# Patient Record
Sex: MALE | Race: WHITE | NOT HISPANIC OR LATINO | URBAN - METROPOLITAN AREA
[De-identification: names, ages, dates, MRNs, and addresses within clinical notes are randomized per-mention and may not be internally consistent; named-entity substitution may affect disease eponyms.]

---

## 2017-01-05 ENCOUNTER — COMMUNICATION - HEALTHEAST (OUTPATIENT)
Dept: FAMILY MEDICINE | Facility: CLINIC | Age: 52
End: 2017-01-05

## 2017-01-18 ENCOUNTER — COMMUNICATION - HEALTHEAST (OUTPATIENT)
Dept: FAMILY MEDICINE | Facility: CLINIC | Age: 52
End: 2017-01-18

## 2017-01-18 ENCOUNTER — OFFICE VISIT - HEALTHEAST (OUTPATIENT)
Dept: FAMILY MEDICINE | Facility: CLINIC | Age: 52
End: 2017-01-18

## 2017-01-18 DIAGNOSIS — I10 BENIGN HYPERTENSION: ICD-10-CM

## 2017-01-18 DIAGNOSIS — E78.5 HYPERLIPIDEMIA: ICD-10-CM

## 2017-01-18 DIAGNOSIS — K21.9 GERD WITHOUT ESOPHAGITIS: ICD-10-CM

## 2017-01-18 LAB
CHOLEST SERPL-MCNC: 235 MG/DL
FASTING STATUS PATIENT QL REPORTED: YES
HDLC SERPL-MCNC: 41 MG/DL
LDLC SERPL CALC-MCNC: 163 MG/DL
TRIGL SERPL-MCNC: 155 MG/DL

## 2017-01-18 ASSESSMENT — MIFFLIN-ST. JEOR: SCORE: 1573.14

## 2017-01-19 ENCOUNTER — COMMUNICATION - HEALTHEAST (OUTPATIENT)
Dept: FAMILY MEDICINE | Facility: CLINIC | Age: 52
End: 2017-01-19

## 2017-02-07 ENCOUNTER — OFFICE VISIT - HEALTHEAST (OUTPATIENT)
Dept: FAMILY MEDICINE | Facility: CLINIC | Age: 52
End: 2017-02-07

## 2017-02-07 ENCOUNTER — COMMUNICATION - HEALTHEAST (OUTPATIENT)
Dept: FAMILY MEDICINE | Facility: CLINIC | Age: 52
End: 2017-02-07

## 2017-02-07 DIAGNOSIS — F41.1 GENERALIZED ANXIETY DISORDER: ICD-10-CM

## 2017-02-07 DIAGNOSIS — I10 ESSENTIAL HYPERTENSION WITH GOAL BLOOD PRESSURE LESS THAN 140/90: ICD-10-CM

## 2017-02-07 DIAGNOSIS — K21.00 REFLUX ESOPHAGITIS: ICD-10-CM

## 2017-04-18 ENCOUNTER — OFFICE VISIT - HEALTHEAST (OUTPATIENT)
Dept: FAMILY MEDICINE | Facility: CLINIC | Age: 52
End: 2017-04-18

## 2017-04-18 ENCOUNTER — COMMUNICATION - HEALTHEAST (OUTPATIENT)
Dept: FAMILY MEDICINE | Facility: CLINIC | Age: 52
End: 2017-04-18

## 2017-04-18 DIAGNOSIS — K21.9 GERD WITHOUT ESOPHAGITIS: ICD-10-CM

## 2017-04-18 DIAGNOSIS — F41.1 GENERALIZED ANXIETY DISORDER: ICD-10-CM

## 2017-04-18 DIAGNOSIS — F41.0 PANIC ATTACKS: ICD-10-CM

## 2017-04-18 DIAGNOSIS — E78.5 HYPERLIPIDEMIA: ICD-10-CM

## 2017-04-18 DIAGNOSIS — I10 BENIGN HYPERTENSION: ICD-10-CM

## 2017-05-16 ENCOUNTER — OFFICE VISIT - HEALTHEAST (OUTPATIENT)
Dept: FAMILY MEDICINE | Facility: CLINIC | Age: 52
End: 2017-05-16

## 2017-05-16 DIAGNOSIS — B07.8 COMMON WART: ICD-10-CM

## 2017-05-16 DIAGNOSIS — F41.1 GENERALIZED ANXIETY DISORDER: ICD-10-CM

## 2017-05-16 DIAGNOSIS — G47.00 INSOMNIA: ICD-10-CM

## 2017-05-16 DIAGNOSIS — E78.5 HYPERLIPIDEMIA, UNSPECIFIED HYPERLIPIDEMIA TYPE: ICD-10-CM

## 2017-05-16 DIAGNOSIS — I10 BENIGN HYPERTENSION: ICD-10-CM

## 2017-06-15 ENCOUNTER — COMMUNICATION - HEALTHEAST (OUTPATIENT)
Dept: FAMILY MEDICINE | Facility: CLINIC | Age: 52
End: 2017-06-15

## 2017-06-15 DIAGNOSIS — F41.1 GENERALIZED ANXIETY DISORDER: ICD-10-CM

## 2017-06-15 DIAGNOSIS — G47.00 INSOMNIA: ICD-10-CM

## 2017-06-19 ENCOUNTER — COMMUNICATION - HEALTHEAST (OUTPATIENT)
Dept: FAMILY MEDICINE | Facility: CLINIC | Age: 52
End: 2017-06-19

## 2017-06-19 DIAGNOSIS — G47.00 INSOMNIA: ICD-10-CM

## 2017-07-17 ENCOUNTER — COMMUNICATION - HEALTHEAST (OUTPATIENT)
Dept: FAMILY MEDICINE | Facility: CLINIC | Age: 52
End: 2017-07-17

## 2017-07-17 DIAGNOSIS — F41.0 PANIC ATTACKS: ICD-10-CM

## 2017-07-17 DIAGNOSIS — I10 ESSENTIAL HYPERTENSION WITH GOAL BLOOD PRESSURE LESS THAN 140/90: ICD-10-CM

## 2017-07-17 DIAGNOSIS — I10 BENIGN HYPERTENSION: ICD-10-CM

## 2017-07-19 ENCOUNTER — COMMUNICATION - HEALTHEAST (OUTPATIENT)
Dept: FAMILY MEDICINE | Facility: CLINIC | Age: 52
End: 2017-07-19

## 2017-07-19 DIAGNOSIS — I10 ESSENTIAL HYPERTENSION WITH GOAL BLOOD PRESSURE LESS THAN 140/90: ICD-10-CM

## 2017-10-17 ENCOUNTER — COMMUNICATION - HEALTHEAST (OUTPATIENT)
Dept: FAMILY MEDICINE | Facility: CLINIC | Age: 52
End: 2017-10-17

## 2017-10-17 DIAGNOSIS — I10 BENIGN HYPERTENSION: ICD-10-CM

## 2017-11-19 ENCOUNTER — COMMUNICATION - HEALTHEAST (OUTPATIENT)
Dept: FAMILY MEDICINE | Facility: CLINIC | Age: 52
End: 2017-11-19

## 2017-11-19 DIAGNOSIS — F41.1 GENERALIZED ANXIETY DISORDER: ICD-10-CM

## 2018-01-07 ENCOUNTER — COMMUNICATION - HEALTHEAST (OUTPATIENT)
Dept: FAMILY MEDICINE | Facility: CLINIC | Age: 53
End: 2018-01-07

## 2018-01-07 DIAGNOSIS — F41.0 PANIC ATTACKS: ICD-10-CM

## 2018-01-07 DIAGNOSIS — K21.9 GERD WITHOUT ESOPHAGITIS: ICD-10-CM

## 2018-01-11 ENCOUNTER — OFFICE VISIT - HEALTHEAST (OUTPATIENT)
Dept: FAMILY MEDICINE | Facility: CLINIC | Age: 53
End: 2018-01-11

## 2018-01-11 ENCOUNTER — COMMUNICATION - HEALTHEAST (OUTPATIENT)
Dept: TELEHEALTH | Facility: CLINIC | Age: 53
End: 2018-01-11

## 2018-01-11 DIAGNOSIS — R42 DIZZINESS: ICD-10-CM

## 2018-01-11 DIAGNOSIS — H92.01 EAR PAIN, RIGHT: ICD-10-CM

## 2018-01-11 ASSESSMENT — MIFFLIN-ST. JEOR: SCORE: 1541.39

## 2018-04-11 ENCOUNTER — COMMUNICATION - HEALTHEAST (OUTPATIENT)
Dept: FAMILY MEDICINE | Facility: CLINIC | Age: 53
End: 2018-04-11

## 2018-04-11 DIAGNOSIS — K21.9 GERD WITHOUT ESOPHAGITIS: ICD-10-CM

## 2018-05-24 ENCOUNTER — COMMUNICATION - HEALTHEAST (OUTPATIENT)
Dept: FAMILY MEDICINE | Facility: CLINIC | Age: 53
End: 2018-05-24

## 2018-05-24 DIAGNOSIS — F41.1 GENERALIZED ANXIETY DISORDER: ICD-10-CM

## 2018-06-26 ENCOUNTER — OFFICE VISIT - HEALTHEAST (OUTPATIENT)
Dept: FAMILY MEDICINE | Facility: CLINIC | Age: 53
End: 2018-06-26

## 2018-06-26 DIAGNOSIS — G47.00 INSOMNIA: ICD-10-CM

## 2018-06-26 DIAGNOSIS — E78.5 HYPERLIPIDEMIA, UNSPECIFIED HYPERLIPIDEMIA TYPE: ICD-10-CM

## 2018-06-26 DIAGNOSIS — Z12.11 SCREEN FOR COLON CANCER: ICD-10-CM

## 2018-06-26 DIAGNOSIS — F41.1 GENERALIZED ANXIETY DISORDER: ICD-10-CM

## 2018-06-26 DIAGNOSIS — K21.9 GERD WITHOUT ESOPHAGITIS: ICD-10-CM

## 2018-06-26 DIAGNOSIS — M54.50 LUMBAR PAIN: ICD-10-CM

## 2018-06-26 DIAGNOSIS — F41.0 PANIC ATTACKS: ICD-10-CM

## 2018-06-26 DIAGNOSIS — I10 BENIGN HYPERTENSION: ICD-10-CM

## 2018-06-26 LAB
ALBUMIN SERPL-MCNC: 3.8 G/DL (ref 3.5–5)
ALP SERPL-CCNC: 61 U/L (ref 45–120)
ALT SERPL W P-5'-P-CCNC: 20 U/L (ref 0–45)
ANION GAP SERPL CALCULATED.3IONS-SCNC: 9 MMOL/L (ref 5–18)
AST SERPL W P-5'-P-CCNC: 14 U/L (ref 0–40)
BILIRUB SERPL-MCNC: 0.4 MG/DL (ref 0–1)
BUN SERPL-MCNC: 11 MG/DL (ref 8–22)
CALCIUM SERPL-MCNC: 9.3 MG/DL (ref 8.5–10.5)
CHLORIDE BLD-SCNC: 109 MMOL/L (ref 98–107)
CHOLEST SERPL-MCNC: 260 MG/DL
CO2 SERPL-SCNC: 23 MMOL/L (ref 22–31)
CREAT SERPL-MCNC: 0.8 MG/DL (ref 0.7–1.3)
FASTING STATUS PATIENT QL REPORTED: YES
GFR SERPL CREATININE-BSD FRML MDRD: >60 ML/MIN/1.73M2
GLUCOSE BLD-MCNC: 118 MG/DL (ref 70–125)
HDLC SERPL-MCNC: 42 MG/DL
LDLC SERPL CALC-MCNC: 191 MG/DL
POTASSIUM BLD-SCNC: 4.3 MMOL/L (ref 3.5–5)
PROT SERPL-MCNC: 6.8 G/DL (ref 6–8)
SODIUM SERPL-SCNC: 141 MMOL/L (ref 136–145)
TRIGL SERPL-MCNC: 137 MG/DL

## 2018-06-27 LAB — 25(OH)D3 SERPL-MCNC: 19.2 NG/ML (ref 30–80)

## 2018-06-28 ENCOUNTER — COMMUNICATION - HEALTHEAST (OUTPATIENT)
Dept: FAMILY MEDICINE | Facility: CLINIC | Age: 53
End: 2018-06-28

## 2018-06-28 DIAGNOSIS — E78.2 MIXED HYPERLIPIDEMIA: ICD-10-CM

## 2018-07-10 ENCOUNTER — COMMUNICATION - HEALTHEAST (OUTPATIENT)
Dept: FAMILY MEDICINE | Facility: CLINIC | Age: 53
End: 2018-07-10

## 2018-07-10 DIAGNOSIS — K21.9 GERD WITHOUT ESOPHAGITIS: ICD-10-CM

## 2018-07-13 ENCOUNTER — COMMUNICATION - HEALTHEAST (OUTPATIENT)
Dept: FAMILY MEDICINE | Facility: CLINIC | Age: 53
End: 2018-07-13

## 2018-07-24 ENCOUNTER — AMBULATORY - HEALTHEAST (OUTPATIENT)
Dept: NURSING | Facility: CLINIC | Age: 53
End: 2018-07-24

## 2018-07-24 DIAGNOSIS — Z01.30 BLOOD PRESSURE CHECK: ICD-10-CM

## 2018-08-28 ENCOUNTER — OFFICE VISIT - HEALTHEAST (OUTPATIENT)
Dept: FAMILY MEDICINE | Facility: CLINIC | Age: 53
End: 2018-08-28

## 2018-08-28 DIAGNOSIS — G47.00 INSOMNIA: ICD-10-CM

## 2018-08-28 DIAGNOSIS — F41.0 PANIC ATTACKS: ICD-10-CM

## 2018-08-28 DIAGNOSIS — I10 BENIGN HYPERTENSION: ICD-10-CM

## 2018-08-28 DIAGNOSIS — F41.1 GENERALIZED ANXIETY DISORDER: ICD-10-CM

## 2018-08-28 DIAGNOSIS — Z79.899 CONTROLLED SUBSTANCE AGREEMENT SIGNED: ICD-10-CM

## 2018-08-28 DIAGNOSIS — F32.1 MODERATE MAJOR DEPRESSION (H): ICD-10-CM

## 2018-08-28 DIAGNOSIS — E78.5 HYPERLIPIDEMIA, UNSPECIFIED HYPERLIPIDEMIA TYPE: ICD-10-CM

## 2018-08-28 DIAGNOSIS — E55.9 VITAMIN D DEFICIENCY: ICD-10-CM

## 2018-08-28 DIAGNOSIS — Z23 IMMUNIZATION DUE: ICD-10-CM

## 2018-08-28 LAB
ALBUMIN SERPL-MCNC: 3.9 G/DL (ref 3.5–5)
ALP SERPL-CCNC: 54 U/L (ref 45–120)
ALT SERPL W P-5'-P-CCNC: 22 U/L (ref 0–45)
ANION GAP SERPL CALCULATED.3IONS-SCNC: 9 MMOL/L (ref 5–18)
AST SERPL W P-5'-P-CCNC: 15 U/L (ref 0–40)
BILIRUB DIRECT SERPL-MCNC: 0.2 MG/DL
BILIRUB SERPL-MCNC: 0.6 MG/DL (ref 0–1)
BUN SERPL-MCNC: 10 MG/DL (ref 8–22)
CALCIUM SERPL-MCNC: 9.5 MG/DL (ref 8.5–10.5)
CHLORIDE BLD-SCNC: 110 MMOL/L (ref 98–107)
CHOLEST SERPL-MCNC: 187 MG/DL
CO2 SERPL-SCNC: 24 MMOL/L (ref 22–31)
CREAT SERPL-MCNC: 0.86 MG/DL (ref 0.7–1.3)
FASTING STATUS PATIENT QL REPORTED: YES
GFR SERPL CREATININE-BSD FRML MDRD: >60 ML/MIN/1.73M2
GLUCOSE BLD-MCNC: 115 MG/DL (ref 70–125)
HDLC SERPL-MCNC: 44 MG/DL
LDLC SERPL CALC-MCNC: 122 MG/DL
POTASSIUM BLD-SCNC: 4.5 MMOL/L (ref 3.5–5)
PROT SERPL-MCNC: 6.7 G/DL (ref 6–8)
SODIUM SERPL-SCNC: 143 MMOL/L (ref 136–145)
TRIGL SERPL-MCNC: 103 MG/DL

## 2018-08-29 LAB — 25(OH)D3 SERPL-MCNC: 58.1 NG/ML (ref 30–80)

## 2018-08-30 ENCOUNTER — COMMUNICATION - HEALTHEAST (OUTPATIENT)
Dept: FAMILY MEDICINE | Facility: CLINIC | Age: 53
End: 2018-08-30

## 2018-09-24 ENCOUNTER — COMMUNICATION - HEALTHEAST (OUTPATIENT)
Dept: FAMILY MEDICINE | Facility: CLINIC | Age: 53
End: 2018-09-24

## 2018-09-24 DIAGNOSIS — E78.2 MIXED HYPERLIPIDEMIA: ICD-10-CM

## 2018-12-10 ENCOUNTER — COMMUNICATION - HEALTHEAST (OUTPATIENT)
Dept: FAMILY MEDICINE | Facility: CLINIC | Age: 53
End: 2018-12-10

## 2018-12-10 DIAGNOSIS — I10 BENIGN HYPERTENSION: ICD-10-CM

## 2018-12-10 DIAGNOSIS — F41.0 PANIC ATTACKS: ICD-10-CM

## 2018-12-10 DIAGNOSIS — G47.00 INSOMNIA: ICD-10-CM

## 2018-12-10 DIAGNOSIS — F41.1 GENERALIZED ANXIETY DISORDER: ICD-10-CM

## 2019-01-10 ENCOUNTER — COMMUNICATION - HEALTHEAST (OUTPATIENT)
Dept: FAMILY MEDICINE | Facility: CLINIC | Age: 54
End: 2019-01-10

## 2019-01-10 DIAGNOSIS — K21.9 GERD WITHOUT ESOPHAGITIS: ICD-10-CM

## 2019-01-17 ENCOUNTER — COMMUNICATION - HEALTHEAST (OUTPATIENT)
Dept: FAMILY MEDICINE | Facility: CLINIC | Age: 54
End: 2019-01-17

## 2019-01-17 DIAGNOSIS — K21.9 GASTROESOPHAGEAL REFLUX DISEASE, ESOPHAGITIS PRESENCE NOT SPECIFIED: ICD-10-CM

## 2019-01-22 ENCOUNTER — COMMUNICATION - HEALTHEAST (OUTPATIENT)
Dept: FAMILY MEDICINE | Facility: CLINIC | Age: 54
End: 2019-01-22

## 2019-01-22 DIAGNOSIS — K21.9 GERD WITHOUT ESOPHAGITIS: ICD-10-CM

## 2019-01-30 ENCOUNTER — COMMUNICATION - HEALTHEAST (OUTPATIENT)
Dept: FAMILY MEDICINE | Facility: CLINIC | Age: 54
End: 2019-01-30

## 2019-01-30 DIAGNOSIS — K21.9 GERD WITHOUT ESOPHAGITIS: ICD-10-CM

## 2019-04-14 ENCOUNTER — COMMUNICATION - HEALTHEAST (OUTPATIENT)
Dept: FAMILY MEDICINE | Facility: CLINIC | Age: 54
End: 2019-04-14

## 2019-04-14 DIAGNOSIS — G47.00 INSOMNIA: ICD-10-CM

## 2019-04-14 DIAGNOSIS — F41.0 PANIC ATTACKS: ICD-10-CM

## 2019-04-30 ENCOUNTER — OFFICE VISIT - HEALTHEAST (OUTPATIENT)
Dept: FAMILY MEDICINE | Facility: CLINIC | Age: 54
End: 2019-04-30

## 2019-04-30 DIAGNOSIS — F41.0 PANIC ATTACKS: ICD-10-CM

## 2019-04-30 DIAGNOSIS — E78.5 HYPERLIPIDEMIA, UNSPECIFIED HYPERLIPIDEMIA TYPE: ICD-10-CM

## 2019-04-30 DIAGNOSIS — B07.8 COMMON WART: ICD-10-CM

## 2019-04-30 DIAGNOSIS — Z79.899 CONTROLLED SUBSTANCE AGREEMENT SIGNED: ICD-10-CM

## 2019-04-30 DIAGNOSIS — I10 BENIGN HYPERTENSION: ICD-10-CM

## 2019-04-30 DIAGNOSIS — F32.1 MODERATE MAJOR DEPRESSION (H): ICD-10-CM

## 2019-04-30 DIAGNOSIS — E55.9 VITAMIN D DEFICIENCY: ICD-10-CM

## 2019-04-30 DIAGNOSIS — G47.00 INSOMNIA: ICD-10-CM

## 2019-04-30 DIAGNOSIS — K21.9 GASTROESOPHAGEAL REFLUX DISEASE WITHOUT ESOPHAGITIS: ICD-10-CM

## 2019-04-30 DIAGNOSIS — F41.1 GENERALIZED ANXIETY DISORDER: ICD-10-CM

## 2019-04-30 DIAGNOSIS — Z12.11 SCREENING FOR COLON CANCER: ICD-10-CM

## 2019-04-30 LAB
ALBUMIN SERPL-MCNC: 3.7 G/DL (ref 3.5–5)
ALP SERPL-CCNC: 57 U/L (ref 45–120)
ALT SERPL W P-5'-P-CCNC: 34 U/L (ref 0–45)
ANION GAP SERPL CALCULATED.3IONS-SCNC: 9 MMOL/L (ref 5–18)
AST SERPL W P-5'-P-CCNC: 16 U/L (ref 0–40)
BILIRUB SERPL-MCNC: 0.3 MG/DL (ref 0–1)
BUN SERPL-MCNC: 8 MG/DL (ref 8–22)
CALCIUM SERPL-MCNC: 9.5 MG/DL (ref 8.5–10.5)
CHLORIDE BLD-SCNC: 107 MMOL/L (ref 98–107)
CHOLEST SERPL-MCNC: 189 MG/DL
CO2 SERPL-SCNC: 25 MMOL/L (ref 22–31)
CREAT SERPL-MCNC: 0.9 MG/DL (ref 0.7–1.3)
FASTING STATUS PATIENT QL REPORTED: YES
GFR SERPL CREATININE-BSD FRML MDRD: >60 ML/MIN/1.73M2
GLUCOSE BLD-MCNC: 114 MG/DL (ref 70–125)
HDLC SERPL-MCNC: 50 MG/DL
LDLC SERPL CALC-MCNC: 103 MG/DL
POTASSIUM BLD-SCNC: 4.4 MMOL/L (ref 3.5–5)
PROT SERPL-MCNC: 6.4 G/DL (ref 6–8)
SODIUM SERPL-SCNC: 141 MMOL/L (ref 136–145)
TRIGL SERPL-MCNC: 180 MG/DL

## 2019-05-01 LAB — 25(OH)D3 SERPL-MCNC: 58 NG/ML (ref 30–80)

## 2019-05-02 ENCOUNTER — COMMUNICATION - HEALTHEAST (OUTPATIENT)
Dept: FAMILY MEDICINE | Facility: CLINIC | Age: 54
End: 2019-05-02

## 2019-05-06 ENCOUNTER — COMMUNICATION - HEALTHEAST (OUTPATIENT)
Dept: FAMILY MEDICINE | Facility: CLINIC | Age: 54
End: 2019-05-06

## 2019-06-24 ENCOUNTER — COMMUNICATION - HEALTHEAST (OUTPATIENT)
Dept: FAMILY MEDICINE | Facility: CLINIC | Age: 54
End: 2019-06-24

## 2019-06-24 DIAGNOSIS — F41.0 PANIC ATTACKS: ICD-10-CM

## 2019-06-24 DIAGNOSIS — G47.00 INSOMNIA: ICD-10-CM

## 2019-07-22 ENCOUNTER — COMMUNICATION - HEALTHEAST (OUTPATIENT)
Dept: FAMILY MEDICINE | Facility: CLINIC | Age: 54
End: 2019-07-22

## 2019-07-22 DIAGNOSIS — K21.9 GERD WITHOUT ESOPHAGITIS: ICD-10-CM

## 2019-09-15 ENCOUNTER — COMMUNICATION - HEALTHEAST (OUTPATIENT)
Dept: FAMILY MEDICINE | Facility: CLINIC | Age: 54
End: 2019-09-15

## 2019-09-15 DIAGNOSIS — F41.0 PANIC ATTACKS: ICD-10-CM

## 2019-09-15 DIAGNOSIS — G47.00 INSOMNIA: ICD-10-CM

## 2019-09-23 ENCOUNTER — COMMUNICATION - HEALTHEAST (OUTPATIENT)
Dept: FAMILY MEDICINE | Facility: CLINIC | Age: 54
End: 2019-09-23

## 2019-09-23 DIAGNOSIS — I10 BENIGN HYPERTENSION: ICD-10-CM

## 2019-10-28 ENCOUNTER — COMMUNICATION - HEALTHEAST (OUTPATIENT)
Dept: FAMILY MEDICINE | Facility: CLINIC | Age: 54
End: 2019-10-28

## 2019-10-28 DIAGNOSIS — E78.2 MIXED HYPERLIPIDEMIA: ICD-10-CM

## 2019-12-10 ENCOUNTER — COMMUNICATION - HEALTHEAST (OUTPATIENT)
Dept: FAMILY MEDICINE | Facility: CLINIC | Age: 54
End: 2019-12-10

## 2019-12-10 DIAGNOSIS — F41.1 GENERALIZED ANXIETY DISORDER: ICD-10-CM

## 2019-12-10 DIAGNOSIS — G47.00 INSOMNIA: ICD-10-CM

## 2019-12-10 DIAGNOSIS — F41.0 PANIC ATTACKS: ICD-10-CM

## 2019-12-17 ENCOUNTER — COMMUNICATION - HEALTHEAST (OUTPATIENT)
Dept: FAMILY MEDICINE | Facility: CLINIC | Age: 54
End: 2019-12-17

## 2019-12-30 ENCOUNTER — COMMUNICATION - HEALTHEAST (OUTPATIENT)
Dept: FAMILY MEDICINE | Facility: CLINIC | Age: 54
End: 2019-12-30

## 2020-01-14 ENCOUNTER — OFFICE VISIT - HEALTHEAST (OUTPATIENT)
Dept: FAMILY MEDICINE | Facility: CLINIC | Age: 55
End: 2020-01-14

## 2020-01-14 ENCOUNTER — COMMUNICATION - HEALTHEAST (OUTPATIENT)
Dept: FAMILY MEDICINE | Facility: CLINIC | Age: 55
End: 2020-01-14

## 2020-01-14 DIAGNOSIS — Z90.81 HISTORY OF SPLENECTOMY: ICD-10-CM

## 2020-01-14 DIAGNOSIS — G47.00 INSOMNIA: ICD-10-CM

## 2020-01-14 DIAGNOSIS — F32.1 MODERATE MAJOR DEPRESSION (H): ICD-10-CM

## 2020-01-14 DIAGNOSIS — Z79.899 CONTROLLED SUBSTANCE AGREEMENT SIGNED: ICD-10-CM

## 2020-01-14 DIAGNOSIS — F41.0 PANIC ATTACKS: ICD-10-CM

## 2020-01-14 DIAGNOSIS — E78.5 HYPERLIPIDEMIA, UNSPECIFIED HYPERLIPIDEMIA TYPE: ICD-10-CM

## 2020-01-14 DIAGNOSIS — K21.9 GERD WITHOUT ESOPHAGITIS: ICD-10-CM

## 2020-01-14 DIAGNOSIS — I10 BENIGN HYPERTENSION: ICD-10-CM

## 2020-01-14 LAB
ANION GAP SERPL CALCULATED.3IONS-SCNC: 8 MMOL/L (ref 5–18)
BUN SERPL-MCNC: 10 MG/DL (ref 8–22)
CALCIUM SERPL-MCNC: 9.2 MG/DL (ref 8.5–10.5)
CHLORIDE BLD-SCNC: 108 MMOL/L (ref 98–107)
CHOLEST SERPL-MCNC: 173 MG/DL
CO2 SERPL-SCNC: 24 MMOL/L (ref 22–31)
CREAT SERPL-MCNC: 0.84 MG/DL (ref 0.7–1.3)
FASTING STATUS PATIENT QL REPORTED: NORMAL
GFR SERPL CREATININE-BSD FRML MDRD: >60 ML/MIN/1.73M2
GLUCOSE BLD-MCNC: 114 MG/DL (ref 70–125)
HDLC SERPL-MCNC: 45 MG/DL
LDLC SERPL CALC-MCNC: 106 MG/DL
POTASSIUM BLD-SCNC: 4.4 MMOL/L (ref 3.5–5)
SODIUM SERPL-SCNC: 140 MMOL/L (ref 136–145)
TRIGL SERPL-MCNC: 108 MG/DL

## 2020-01-14 ASSESSMENT — ANXIETY QUESTIONNAIRES
IF YOU CHECKED OFF ANY PROBLEMS ON THIS QUESTIONNAIRE, HOW DIFFICULT HAVE THESE PROBLEMS MADE IT FOR YOU TO DO YOUR WORK, TAKE CARE OF THINGS AT HOME, OR GET ALONG WITH OTHER PEOPLE: SOMEWHAT DIFFICULT
3. WORRYING TOO MUCH ABOUT DIFFERENT THINGS: SEVERAL DAYS
1. FEELING NERVOUS, ANXIOUS, OR ON EDGE: SEVERAL DAYS
4. TROUBLE RELAXING: SEVERAL DAYS
7. FEELING AFRAID AS IF SOMETHING AWFUL MIGHT HAPPEN: SEVERAL DAYS
GAD7 TOTAL SCORE: 7
2. NOT BEING ABLE TO STOP OR CONTROL WORRYING: SEVERAL DAYS
6. BECOMING EASILY ANNOYED OR IRRITABLE: SEVERAL DAYS
5. BEING SO RESTLESS THAT IT IS HARD TO SIT STILL: SEVERAL DAYS

## 2020-01-14 ASSESSMENT — MIFFLIN-ST. JEOR: SCORE: 1542.53

## 2020-01-14 ASSESSMENT — PATIENT HEALTH QUESTIONNAIRE - PHQ9: SUM OF ALL RESPONSES TO PHQ QUESTIONS 1-9: 6

## 2020-03-27 ENCOUNTER — VIRTUAL VISIT (OUTPATIENT)
Dept: URGENT CARE | Facility: CLINIC | Age: 55
End: 2020-03-27

## 2020-03-27 ENCOUNTER — COMMUNICATION - HEALTHEAST (OUTPATIENT)
Dept: FAMILY MEDICINE | Facility: CLINIC | Age: 55
End: 2020-03-27

## 2020-03-27 DIAGNOSIS — G47.00 INSOMNIA: ICD-10-CM

## 2020-03-27 DIAGNOSIS — F41.0 PANIC ATTACKS: ICD-10-CM

## 2020-03-27 DIAGNOSIS — Q89.01 ASPLENIA: ICD-10-CM

## 2020-03-27 DIAGNOSIS — Z20.822 SUSPECTED COVID-19 VIRUS INFECTION: Primary | ICD-10-CM

## 2020-03-27 DIAGNOSIS — R05.9 COUGH: ICD-10-CM

## 2020-03-27 PROCEDURE — 99203 OFFICE O/P NEW LOW 30 MIN: CPT | Mod: TEL | Performed by: STUDENT IN AN ORGANIZED HEALTH CARE EDUCATION/TRAINING PROGRAM

## 2020-03-27 RX ORDER — BENZONATATE 100 MG/1
100 CAPSULE ORAL 3 TIMES DAILY PRN
Qty: 30 CAPSULE | Refills: 0 | Status: SHIPPED | OUTPATIENT
Start: 2020-03-27 | End: 2020-04-06

## 2020-03-27 RX ORDER — GUAIFENESIN 600 MG/1
600 TABLET, EXTENDED RELEASE ORAL 2 TIMES DAILY
Qty: 20 TABLET | Refills: 0 | Status: SHIPPED | OUTPATIENT
Start: 2020-03-27 | End: 2020-04-06

## 2020-03-27 NOTE — PROGRESS NOTES
"Preceptor Attestation:   Patient discussed with the resident. Assessment and plan reviewed with resident and agreed upon.   Supervising Physician:  Ryan Richardson MD  [facility]      The patient has been notified of following:     \"This telephone visit will be conducted via a call between you and your physician/provider. We have found that certain health care needs can be provided without the need for a physical exam.  This service lets us provide the care you need with a short phone conversation.  If a prescription is necessary we can send it directly to your pharmacy.  If lab work is needed we can place an order for that and you can then stop by our lab to have the test done at a later time.    If during the course of the call the physician/provider feels a telephone visit is not appropriate, you will not be charged for this service.\"     Subjective     CC: Elvin Trivedi  is a 54 year old male who presents to clinic today for the following health issues:   Chief Complaint   Patient presents with     Suspected Covid      HPI:    Mr. Trivedi is a 54 year old male with a pmhx significant for HTN (on ARB), HLD (on statin), MDD/SILVIA, panic disorder, and asplenia presenting as a suspected COVID-19 visit.     Briefly, he provides \"I have a cold\", increased cough. Has been feeling sweats and chills each night for 48 hours.     T: 97.6 today, just found a thermometer, otherwise no recorded fevers.     Is asplenic (ruptured at age 10), did receive his flu shot this year. He thinks he is pretty sure that he has received shot for meningitis and pneumonia, though isn't sure. His records are elsewhere. Has never had a bacterial pneumonia, used to take penicillin ppx as a child.    Denies nausea, vomiting, diarrhea.     Home care has included NyQuil and DayQuil gel caps, symptoms have mildly improved and has helped with sleep.     COVID-19 Specific History:  In the 14 days before your symptoms started, have " you had close contact with a COVID-19 (Coronavirus) patient? No    In the 14 days before your symptoms started, have you traveled internationally or to a state with high rates of COVID-19? Yes, was in [place] last weekend, [facility].     Please list the country or state where you have traveled in the 14 days before your symptoms started? [place] (though did practice social distancing)    Do you have a fever? No, I do not have a fever though did have two nights of sweats    Are you having difficulty breathing? Yes, some mild chest tightness when going up stairs     Please describe what kind of difficulty you are having breathing.     Do you have a cough? Yes      Is your coughing worse when you are exposed to pollen, dust, or other things in the environment? No      Are you coughing up any mucus? I am coughing up a little bit of mucus.     What is the appearance of the mucus? I am swallowing everything I cough up    Are you experiencing any of the following? Frequent heartburn, takes omeprazole for this, did take 2x TUMS last night     What other symptoms have you experienced? Runny Nose, Blocked Sinuses, Sore Throat, Headache and Body aches    Do you have any of the following? Sweating at night, Loss of appetite and Fatigue    Have you ever been diagnosed with asthma, bronchitis, or lung disease? No    Do you smoke? Yes, on and off, 5-10 cigs/day, hasn't smoked in 2 days    Are there people you know with similar symptoms? No     Have you recently been hospitalized? No    Are you pregnant or breastfeeding?: No           Review of Systems   ROS COMP: Constitutional, HEENT, cardiovascular, pulmonary, GI, , musculoskeletal, neuro, skin, endocrine and psych systems are negative, except as otherwise noted.      Objective    Gen: Patient is alert, oriented  Resp: is speaking full sentences, with cough,  without audible shortness of breath, without wheezing  Psych: mentation appears normal, affect normal, and  mildly anxious    No test results relevant to this encounter          Assessment/Plan:  #. Presumed COVID-19 infection, constitutional viral illness NOS  #. Asplenia, immunocompromised status  Patient's symptom constellation suspicious for COVID-19. Given abundance of caution will treat/quarantine as such. Further, due to his asplenic status and unclear vaccination history, will have a lower threshold to have patient be evaluated for secondary bacterial process. He was educated for signs/symptoms to be wary of and when to be evaluated in the ED.   -- continue symptomatic management for fever, cough, chills, congestion with DayQuil/NyQuil  -- will send benzonatate and guaifenesin prescriptions to his local pharmacy  -- patient agreeable to both MyChart enrollment and Get Well [facility] enrollment  -- patient fully educated to CDC guidelines for quarantine, home care, and red flag symptoms  -- patient provided contact information for [facility]    Phone call duration: 25 minutes    Louis Snow MD  Internal Medicine     Patient encounter fully staffed with Rhoda.    ==============================================================  PATIENT EDUCATION MATERIALS:    Instructions for Patients  Your symptoms could be due to COVID-19, but it also could be due to a number of other respiratory illnesses.  We are not doing testing at this time for COVID-19 unless symptoms are severe enough to require hospitalization.  It is recommended that you stay home to prevent the spread of your illness.  To do this follow these instructions:    Regardless of if you have been tested or not:  Patient who have symptoms (cough, fever, or shortness of breath), need to isolate for 7 days from when symptoms started AND 72 hours after fever resolves (without fever reducing medications) AND improvement of respiratory symptoms (whichever is longer).      Isolate yourself at home (in own room/own bathroom if possible)    Do Not allow any visitors    Do  Not go to work or school    Do Not go to Worship,  centers, shopping, or other public places.    Do Not shake hands.    Avoid close and intimate contact with others (hugging, kissing).    Follow CDC recommendations for household cleaning of frequently touched services.     After the initial 7 days, continue to isolate yourself from household members as much as possible. To continue decrease the risk of community spread and exposure, you and any members of your household should limit activities in public for 14 days after starting home isolation.     You can reference the following CDC link for helpful home isolation/care tips:  https://www.cdc.gov/coronavirus/2019-ncov/downloads/10Things.pdf    Protect Others:    Cover your mouth and nose with a mask, disposable tissue or wash cloth to avoid spreading germs to others.    Wash your hands and face frequently with soap and water.    Fever Medicines:    For fever relief, take acetaminophen or ibuprofen.    Treat fevers above 101  F (38.3  C) to lower fevers and make you more comfortable.     Acetaminophen (e.g., Tylenol): Take 650 mg (two 325 mg pills) by mouth every 4-6 hours as needed of regular strength Tylenol or 1,000 mg (two 500 mg pills) every 8 hours as needed of Extra Strength Tylenol.     Ibuprofen (e.g., Motrin, Advil): Take 400 mg (two 200 mg pills) by mouth every 6 hours as needed.     Acetaminophen is thought to be safer than ibuprofen or naproxen for people over 65 years old. Acetaminophen is in many OTC and prescription medicines. It might be in more than one medicine that you are taking. You need to be careful and not take an overdose. Before taking any medicine, read all the instructions on the package.    Caution - NSAIDs (e.g., ibuprofen, naproxen): Do not take nonsteroidal anti-inflammatory drugs (NSAIDs) if you have stomach problems, kidney disease, heart failure, or other contraindications to using this type of medicine. Do not take NSAID  medicines for over 7 days without consulting your PCP. Do not take NSAID medicines if you are pregnant. Do not take NSAID medicines if you are also taking blood thinners.     Call Back If: Breathing difficulty develops or you become worse.    Thank you for limiting contact with others, wearing a simple mask to cover your cough, practice good hand hygiene habits and accessing our virtual services where possible to limit the spread of this virus.    For more information about COVID19 and options for caring for yourself at home, please visit the CDC website at https://www.cdc.gov/coronavirus/2019-ncov/about/steps-when-sick.html  For more options for care at [facility], please visit our website at [email or web address]

## 2020-03-27 NOTE — PATIENT INSTRUCTIONS
Instructions for Patients  Your symptoms could be due to COVID-19, but it also could be due to a number of other respiratory illnesses.  We are not doing testing at this time for COVID-19 unless symptoms are severe enough to require hospitalization.  It is recommended that you stay home to prevent the spread of your illness.  To do this follow these instructions:    Regardless of if you have been tested or not:  Patient who have symptoms (cough, fever, or shortness of breath), need to isolate for 7 days from when symptoms started AND 72 hours after fever resolves (without fever reducing medications) AND improvement of respiratory symptoms (whichever is longer).      Isolate yourself at home (in own room/own bathroom if possible)    Do Not allow any visitors    Do Not go to work or school    Do Not go to Denominational,  centers, shopping, or other public places.    Do Not shake hands.    Avoid close and intimate contact with others (hugging, kissing).    Follow CDC recommendations for household cleaning of frequently touched services.     After the initial 7 days, continue to isolate yourself from household members as much as possible. To continue decrease the risk of community spread and exposure, you and any members of your household should limit activities in public for 14 days after starting home isolation.     You can reference the following CDC link for helpful home isolation/care tips:  https://www.cdc.gov/coronavirus/2019-ncov/downloads/10Things.pdf    Protect Others:    Cover your mouth and nose with a mask, disposable tissue or wash cloth to avoid spreading germs to others.    Wash your hands and face frequently with soap and water.    Fever Medicines:    For fever relief, take acetaminophen or ibuprofen.    Treat fevers above 101  F (38.3  C) to lower fevers and make you more comfortable.     Acetaminophen (e.g., Tylenol): Take 650 mg (two 325 mg pills) by mouth every 4-6 hours as needed of regular  strength Tylenol or 1,000 mg (two 500 mg pills) every 8 hours as needed of Extra Strength Tylenol.     Ibuprofen (e.g., Motrin, Advil): Take 400 mg (two 200 mg pills) by mouth every 6 hours as needed.     Acetaminophen is thought to be safer than ibuprofen or naproxen for people over 65 years old. Acetaminophen is in many OTC and prescription medicines. It might be in more than one medicine that you are taking. You need to be careful and not take an overdose. Before taking any medicine, read all the instructions on the package.    Caution - NSAIDs (e.g., ibuprofen, naproxen): Do not take nonsteroidal anti-inflammatory drugs (NSAIDs) if you have stomach problems, kidney disease, heart failure, or other contraindications to using this type of medicine. Do not take NSAID medicines for over 7 days without consulting your PCP. Do not take NSAID medicines if you are pregnant. Do not take NSAID medicines if you are also taking blood thinners.     Call Back If: Breathing difficulty develops or you become worse.    Thank you for limiting contact with others, wearing a simple mask to cover your cough, practice good hand hygiene habits and accessing our virtual services where possible to limit the spread of this virus.    For more information about COVID19 and options for caring for yourself at home, please visit the CDC website at https://www.cdc.gov/coronavirus/2019-ncov/about/steps-when-sick.html  For more options for care at [facility], please visit our website at [email or web address]   Instructions for Patients  Your symptoms could be due to COVID-19, but it also could be due to a number of other respiratory illnesses.  We are not doing testing at this time for COVID-19 unless symptoms are severe enough to require hospitalization.  It is recommended that you stay home to prevent the spread of your illness.  To do this follow these instructions:    Regardless of if you have been tested or  not:  Patient who have symptoms (cough, fever, or shortness of breath), need to isolate for 7 days from when symptoms started AND 72 hours after fever resolves (without fever reducing medications) AND improvement of respiratory symptoms (whichever is longer).      Isolate yourself at home (in own room/own bathroom if possible)    Do Not allow any visitors    Do Not go to work or school    Do Not go to Mosque,  centers, shopping, or other public places.    Do Not shake hands.    Avoid close and intimate contact with others (hugging, kissing).    Follow CDC recommendations for household cleaning of frequently touched services.     After the initial 7 days, continue to isolate yourself from household members as much as possible. To continue decrease the risk of community spread and exposure, you and any members of your household should limit activities in public for 14 days after starting home isolation.     You can reference the following [facility] link for helpful home isolation/care tips:  https://www.cdc.gov/coronavirus/2019-ncov/downloads/10Things.pdf    Protect Others:    Cover your mouth and nose with a mask, disposable tissue or wash cloth to avoid spreading germs to others.    Wash your hands and face frequently with soap and water.    Fever Medicines:    For fever relief, take acetaminophen or ibuprofen.    Treat fevers above 101  F (38.3  C) to lower fevers and make you more comfortable.     Acetaminophen (e.g., Tylenol): Take 650 mg (two 325 mg pills) by mouth every 4-6 hours as needed of regular strength Tylenol or 1,000 mg (two 500 mg pills) every 8 hours as needed of Extra Strength Tylenol.     Ibuprofen (e.g., Motrin, Advil): Take 400 mg (two 200 mg pills) by mouth every 6 hours as needed.     Acetaminophen is thought to be safer than ibuprofen or naproxen for people over 65 years old. Acetaminophen is in many OTC and prescription medicines. It might be in more than one medicine that you are taking.  You need to be careful and not take an overdose. Before taking any medicine, read all the instructions on the package.    Caution - NSAIDs (e.g., ibuprofen, naproxen): Do not take nonsteroidal anti-inflammatory drugs (NSAIDs) if you have stomach problems, kidney disease, heart failure, or other contraindications to using this type of medicine. Do not take NSAID medicines for over 7 days without consulting your PCP. Do not take NSAID medicines if you are pregnant. Do not take NSAID medicines if you are also taking blood thinners.     Call Back If: Breathing difficulty develops or you become worse.    Thank you for limiting contact with others, wearing a simple mask to cover your cough, practice good hand hygiene habits and accessing our virtual services where possible to limit the spread of this virus.    For more information about COVID19 and options for caring for yourself at home, please visit the CDC website at https://www.cdc.gov/coronavirus/2019-ncov/about/steps-when-sick.html  For more options for care at [facility], please visit our website at [email or web address]

## 2020-04-22 ENCOUNTER — COMMUNICATION - HEALTHEAST (OUTPATIENT)
Dept: FAMILY MEDICINE | Facility: CLINIC | Age: 55
End: 2020-04-22

## 2020-04-22 DIAGNOSIS — E78.2 MIXED HYPERLIPIDEMIA: ICD-10-CM

## 2020-05-01 ENCOUNTER — VIRTUAL VISIT (OUTPATIENT)
Dept: FAMILY MEDICINE | Facility: OTHER | Age: 55
End: 2020-05-01

## 2020-05-01 ENCOUNTER — COMMUNICATION - HEALTHEAST (OUTPATIENT)
Dept: SCHEDULING | Facility: CLINIC | Age: 55
End: 2020-05-01

## 2020-05-03 ENCOUNTER — OFFICE VISIT (OUTPATIENT)
Dept: URGENT CARE | Facility: URGENT CARE | Age: 55
End: 2020-05-03
Payer: COMMERCIAL

## 2020-05-03 DIAGNOSIS — Z20.822 SUSPECTED 2019 NOVEL CORONAVIRUS INFECTION: Primary | ICD-10-CM

## 2020-05-03 PROCEDURE — 87635 SARS-COV-2 COVID-19 AMP PRB: CPT | Mod: 90 | Performed by: FAMILY MEDICINE

## 2020-05-03 PROCEDURE — 99000 SPECIMEN HANDLING OFFICE-LAB: CPT | Performed by: FAMILY MEDICINE

## 2020-05-03 PROCEDURE — 99207 ZZC NO BILLABLE SERVICE THIS VISIT: CPT

## 2020-05-04 LAB
SARS-COV-2 RNA SPEC QL NAA+PROBE: NOT DETECTED
SPECIMEN SOURCE: NORMAL

## 2020-06-09 ENCOUNTER — COMMUNICATION - HEALTHEAST (OUTPATIENT)
Dept: FAMILY MEDICINE | Facility: CLINIC | Age: 55
End: 2020-06-09

## 2020-06-09 DIAGNOSIS — F41.1 GENERALIZED ANXIETY DISORDER: ICD-10-CM

## 2020-06-09 DIAGNOSIS — I10 BENIGN HYPERTENSION: ICD-10-CM

## 2020-07-22 ENCOUNTER — COMMUNICATION - HEALTHEAST (OUTPATIENT)
Dept: FAMILY MEDICINE | Facility: CLINIC | Age: 55
End: 2020-07-22

## 2020-07-22 DIAGNOSIS — K21.9 GERD WITHOUT ESOPHAGITIS: ICD-10-CM

## 2020-07-22 DIAGNOSIS — F41.0 PANIC ATTACKS: ICD-10-CM

## 2020-07-22 DIAGNOSIS — G47.00 INSOMNIA: ICD-10-CM

## 2020-08-27 ENCOUNTER — OFFICE VISIT - HEALTHEAST (OUTPATIENT)
Dept: FAMILY MEDICINE | Facility: CLINIC | Age: 55
End: 2020-08-27

## 2020-08-27 DIAGNOSIS — Z79.899 CONTROLLED SUBSTANCE AGREEMENT SIGNED: ICD-10-CM

## 2020-08-27 DIAGNOSIS — Z12.11 SCREEN FOR COLON CANCER: ICD-10-CM

## 2020-08-27 DIAGNOSIS — E78.5 HYPERLIPIDEMIA, UNSPECIFIED HYPERLIPIDEMIA TYPE: ICD-10-CM

## 2020-08-27 DIAGNOSIS — F41.1 GENERALIZED ANXIETY DISORDER: ICD-10-CM

## 2020-08-27 DIAGNOSIS — Z12.5 SCREENING FOR PROSTATE CANCER: ICD-10-CM

## 2020-08-27 DIAGNOSIS — I10 BENIGN HYPERTENSION: ICD-10-CM

## 2020-08-27 DIAGNOSIS — G47.00 INSOMNIA: ICD-10-CM

## 2020-08-27 DIAGNOSIS — Z90.81 HISTORY OF SPLENECTOMY: ICD-10-CM

## 2020-08-27 DIAGNOSIS — F41.0 PANIC ATTACKS: ICD-10-CM

## 2020-08-27 LAB
ALBUMIN SERPL-MCNC: 3.9 G/DL (ref 3.5–5)
ALP SERPL-CCNC: 57 U/L (ref 45–120)
ALT SERPL W P-5'-P-CCNC: 19 U/L (ref 0–45)
ANION GAP SERPL CALCULATED.3IONS-SCNC: 9 MMOL/L (ref 5–18)
AST SERPL W P-5'-P-CCNC: 15 U/L (ref 0–40)
BILIRUB SERPL-MCNC: 0.6 MG/DL (ref 0–1)
BUN SERPL-MCNC: 9 MG/DL (ref 8–22)
CALCIUM SERPL-MCNC: 9.2 MG/DL (ref 8.5–10.5)
CHLORIDE BLD-SCNC: 108 MMOL/L (ref 98–107)
CHOLEST SERPL-MCNC: 178 MG/DL
CO2 SERPL-SCNC: 24 MMOL/L (ref 22–31)
CREAT SERPL-MCNC: 0.9 MG/DL (ref 0.7–1.3)
FASTING STATUS PATIENT QL REPORTED: YES
GFR SERPL CREATININE-BSD FRML MDRD: >60 ML/MIN/1.73M2
GLUCOSE BLD-MCNC: 113 MG/DL (ref 70–125)
HDLC SERPL-MCNC: 42 MG/DL
LDLC SERPL CALC-MCNC: 113 MG/DL
POTASSIUM BLD-SCNC: 4.3 MMOL/L (ref 3.5–5)
PROT SERPL-MCNC: 6.8 G/DL (ref 6–8)
PSA SERPL-MCNC: 1.3 NG/ML (ref 0–3.5)
SODIUM SERPL-SCNC: 141 MMOL/L (ref 136–145)
TRIGL SERPL-MCNC: 114 MG/DL

## 2020-08-27 ASSESSMENT — ANXIETY QUESTIONNAIRES
6. BECOMING EASILY ANNOYED OR IRRITABLE: SEVERAL DAYS
GAD7 TOTAL SCORE: 6
IF YOU CHECKED OFF ANY PROBLEMS ON THIS QUESTIONNAIRE, HOW DIFFICULT HAVE THESE PROBLEMS MADE IT FOR YOU TO DO YOUR WORK, TAKE CARE OF THINGS AT HOME, OR GET ALONG WITH OTHER PEOPLE: NOT DIFFICULT AT ALL
7. FEELING AFRAID AS IF SOMETHING AWFUL MIGHT HAPPEN: NOT AT ALL
2. NOT BEING ABLE TO STOP OR CONTROL WORRYING: SEVERAL DAYS
1. FEELING NERVOUS, ANXIOUS, OR ON EDGE: SEVERAL DAYS
3. WORRYING TOO MUCH ABOUT DIFFERENT THINGS: SEVERAL DAYS
4. TROUBLE RELAXING: SEVERAL DAYS
5. BEING SO RESTLESS THAT IT IS HARD TO SIT STILL: SEVERAL DAYS

## 2020-08-27 ASSESSMENT — MIFFLIN-ST. JEOR: SCORE: 1506.24

## 2020-08-27 ASSESSMENT — PATIENT HEALTH QUESTIONNAIRE - PHQ9: SUM OF ALL RESPONSES TO PHQ QUESTIONS 1-9: 4

## 2020-10-22 ENCOUNTER — COMMUNICATION - HEALTHEAST (OUTPATIENT)
Dept: FAMILY MEDICINE | Facility: CLINIC | Age: 55
End: 2020-10-22

## 2020-10-22 DIAGNOSIS — F41.0 PANIC ATTACKS: ICD-10-CM

## 2020-10-22 DIAGNOSIS — G47.00 INSOMNIA: ICD-10-CM

## 2020-11-06 ENCOUNTER — RECORDS - HEALTHEAST (OUTPATIENT)
Dept: ADMINISTRATIVE | Facility: OTHER | Age: 55
End: 2020-11-06

## 2020-11-19 ENCOUNTER — OFFICE VISIT (OUTPATIENT)
Dept: URGENT CARE | Facility: URGENT CARE | Age: 55
End: 2020-11-19
Payer: COMMERCIAL

## 2020-11-19 VITALS
DIASTOLIC BLOOD PRESSURE: 80 MMHG | HEART RATE: 66 BPM | WEIGHT: 160 LBS | OXYGEN SATURATION: 96 % | TEMPERATURE: 97.8 F | SYSTOLIC BLOOD PRESSURE: 160 MMHG

## 2020-11-19 DIAGNOSIS — M54.50 LUMBAR PAIN: Primary | ICD-10-CM

## 2020-11-19 PROCEDURE — 99214 OFFICE O/P EST MOD 30 MIN: CPT | Performed by: PHYSICIAN ASSISTANT

## 2020-11-19 RX ORDER — IBUPROFEN 200 MG
200 TABLET ORAL EVERY 4 HOURS PRN
COMMUNITY

## 2020-11-19 RX ORDER — NAPROXEN 500 MG/1
500 TABLET ORAL 2 TIMES DAILY WITH MEALS
Qty: 20 TABLET | Refills: 0 | Status: SHIPPED | OUTPATIENT
Start: 2020-11-19 | End: 2020-11-29

## 2020-11-19 RX ORDER — ALPRAZOLAM 0.5 MG
TABLET ORAL
COMMUNITY
Start: 2020-10-22 | End: 2021-07-20

## 2020-11-19 RX ORDER — LOSARTAN POTASSIUM 100 MG/1
TABLET ORAL
COMMUNITY
Start: 2020-06-11 | End: 2021-12-31

## 2020-11-19 RX ORDER — CYCLOBENZAPRINE HCL 10 MG
5-10 TABLET ORAL 3 TIMES DAILY PRN
Qty: 30 TABLET | Refills: 0 | Status: SHIPPED | OUTPATIENT
Start: 2020-11-19 | End: 2020-11-29

## 2020-11-19 RX ORDER — ATORVASTATIN CALCIUM 10 MG/1
TABLET, FILM COATED ORAL
COMMUNITY
Start: 2020-04-23 | End: 2021-10-21

## 2020-11-19 RX ORDER — OMEPRAZOLE 40 MG/1
CAPSULE, DELAYED RELEASE ORAL
COMMUNITY
Start: 2020-07-25 | End: 2021-07-20

## 2020-11-19 NOTE — PATIENT INSTRUCTIONS
Patient Education     Back Sprain or Strain     Injury to the muscles (strain) or ligaments (sprain) around the spine can be troubling. Injury may occur after a sudden forceful twisting or bending such as in a car accident, after a simple awkward movement, or after lifting something heavy with poor body positioning. In any case, muscle spasm is often present and adds to the pain.  Thankfully, most people feel better in 1 to 2 weeks. Most of the rest feel better in 1 to 2 months. Most people can remain active. Unless you had a forceful or traumatic physical injury such as a car accident or fall, X-rays may not be done for the first assessment of a back sprain or strain. If pain continues and doesn't respond to medical treatment, your healthcare provider may then do X-rays and other tests.  Home care  These guidelines will help you care for your injury at home:    When in bed, try to find a comfortable position. A firm mattress is best. Try lying flat on your back with pillows under your knees. You can also try lying on your side with your knees bent up toward your chest and a pillow between your knees.    Don't sit for long periods. Try not to take long car rides or take other trips that have you sitting for a long time. This puts more stress on the lower back than standing or walking.    During the first 24 to 72 hours after an injury or flare-up, put an ice pack on the painful area for 20 minutes. Then remove it for 20 minutes. Do this for 60 to 90 minutes, or several times a day. This will reduce swelling and pain. Always wrap the ice pack in a thin towel or plastic to protect your skin.    You can start with ice, then switch to heat. Heat from a hot shower, hot bath, or heating pad reduces pain and works well for muscle spasms. Put heat on the painful area for 20 minutes, then remove for 20 minutes. Do this for 60 to 90 minutes, or several times a day. Don't use a heating pad while sleeping. It can burn the  skin.    You can alternate the ice and heat. Talk with your healthcare provider to find out the best treatment or therapy for your back pain.    Therapeutic massage can help relax the back muscles without stretching them.    Be aware of safe lifting methods. Don't lift anything over 15 pounds until all of the pain is gone.  Medicines  Talk with your healthcare provider before using medicines, especially if you have other health problems or are taking other medicines.    You may use over-the-counter medicines such as acetaminophen, ibuprofen, or naproxen to control pain, unless another pain medicine was prescribed. Talk with your healthcare provider before taking any medicines if you have a chronic condition such as diabetes, liver or kidney disease, stomach ulcers, or digestive bleeding, or are taking blood-thinner medicines.    Be careful if you are given prescription medicines, opioids, or medicine for muscle spasm. They can cause drowsiness, and affect your coordination, reflexes, and judgment. Don't drive or operate heavy machinery when taking these types of medicines. Only take pain medicine as prescribed by your healthcare provider.  Follow-up care  Follow up with your healthcare provider, or as advised. You may need physical therapy or more tests if your symptoms get worse.  If you had X-rays, your healthcare provider may be checking for any broken bones, breaks, or fractures. Bruises and sprains can sometimes hurt as much as a fracture. These injuries can take time to heal fully. If your symptoms don t get better or they get worse, talk with your healthcare provider. You may need a repeat X-ray or other tests.  Call 911  Call 911 if any of the following occur:    Trouble breathing    Confused    Very drowsy or trouble awakening    Fainting or loss of consciousness    Rapid or very slow heart rate    Loss of bowel or bladder control  When to seek medical advice  Call your healthcare provider right away if any  of the following occur:    Pain gets worse or spreads to your arms or legs    Weakness or numbness in one or both arms or legs    Numbness in the groin or genital area  [employer]/O last reviewed this educational content on 11/1/2019 2000-2020 [employer]. [address] [place] [id number]. All rights reserved. This information is not intended as a substitute for professional medical care. Always follow your healthcare professional's instructions.               November 19, 2020 [facility] Plan:       1.  Start the prescription anti-inflammatory/pain relieving medication (Naproxen Sodium) I prescribed for you. Do not take over-the-counter Ibuprofen/Motrin/Advil or Aleve while taking this medication. You may take over-the-counter Tylenol as needed for breakthrough pain.     2. Take the Flexeril muscle relaxer three times a day as tolerated.   As we discussed this medication can make some people drowsy. Do not drive while taking this medication until you see how this medication effects you personally. You may want to take a 1/2 tablet during the day and a whole tab at night.      3. Follow-up with your primary care provider or orthopedic doctor for evaluation if your symptom change, worsen or fail to fully resolve with the treatment provided here today.     4.  If you develop the below symptoms, you should present directly to the emergency room:       You have a sudden change in your ability to control? your bladder or bowels.    You begin to feel numbness and tingling in your groin     The pain spreads down your leg and into your foot that does not go away with moving positions.    Your toes, feet or leg muscles begin to feel weak.    You feel generally unwell, develop a fever or sick.    Your pain suddenly gets worse.     You develop sudden onset abdominal pain

## 2020-11-19 NOTE — PROGRESS NOTES
"      SUBJECTIVE:    HPI: Elvin Trivedi is a 54 year old male who presents for evaluation of  Bilateral back pain (R>L). I specifically asked patient if this is work injury and he responds \"no\".      HPI: Symptoms began: One week ago. Patient states he slipped on some ice getting out of car last Thursday. Pain has reportedly been waxing and waning since onset. Yesterday, patient states, \"I tweaked it again getting into a chair.\"    No history of other acute trauma or injury     Pain is located: see above HPI   Perceived Pain Level:  Best 4/10 sitting or laying and 9/10 with bending, twisting or positional changes   Recent injury: See above HPI  Personal hx of back pain is recurrent self limited- No history of spine surgery or epidural spine injections    Pain is exacerbated by:  Trunk twisting, lifting and bending forward .  Pain is relieved by: improved but not relieved by alternating ice, heat and OTC NSAID's (ibuprofen 600 mg three times daily)    Cauda Equina Review: No fecal incontinence, lower extremity numbness, LE tingling, urinary incontinence, saddle area paraesthesias.     Impingement Review: Brief, several seconds in total duration, tingling all the way down right  leg into right heel. No other lower extremity symptoms or acute lower extremity weakness.     ROS     CONSTITUTIONAL:No fever, chills, night sweats, weight loss, dizziness, fatigue, weakness.  CARDIAC: No chest pain or shortness of breath. No syncope or near syncope  RESP: No cough, wheezing, SOB or hemoptysis  GI: No acute onset abdominal pain. No acute onset nausea/vomiting or diarreha   SKIN: No rash, vesicles or lesions   URINARY REVIEW:  No sudden onset dysuria, urinary urgency/frequency, hematuria, fever, chills, No  red, warm or swollen joints   LE: No sudden onset lower leg swelling       History reviewed. No pertinent past medical history.    There is no problem list on file for this patient.      Past Surgical History:   Procedure " Laterality Date     SURGICAL HISTORY OF -       spleen and appy         Current Outpatient Medications   Medication     ALPRAZolam (XANAX) 0.5 MG tablet     atorvastatin (LIPITOR) 10 MG tablet     FLUoxetine (PROZAC) 20 MG capsule     ibuprofen (ADVIL/MOTRIN) 200 MG tablet     losartan (COZAAR) 100 MG tablet     omeprazole (PRILOSEC) 40 MG DR capsule     meclizine (ANTIVERT) 25 MG tablet     ondansetron (ZOFRAN-ODT) 4 MG disintegrating tablet     No current facility-administered medications for this visit.        Allergies   Allergen Reactions     Aspirin [Dihydroxyaluminum Aminoacetate]      Red dots on face  Patient states he does not have problems with Ibuprofen or other NSAIDS           OBJECTIVE:  BP (!) 160/80   Pulse 66   Temp 97.8  F (36.6  C)   Wt 72.6 kg (160 lb)   SpO2 96%         GENERAL:  Very pleasant, comfortable and generally well appearing.  Back examination: Back symmetric, no curvature.  Positive for bilateral lumbar paravertebral muscle tenderness and tightness bilaterally. Neuro:  Antalgic gait. Uses cane assist.  Quadriceps and great toe strength good and equal bilaterally.  Patellar  reflexes good and equal bilaterally. Sensation in multiple dermatomal distributions LE good and equal bilaterally.  STRAIGHT LEG RAISE: Negative.   RESP: lungs clear to auscultation - no rales, rhonchi or wheezes  CV: regular rates and rhythm, normal S1 S2, no murmur noted  ABDOMEN:  soft, nontender, no HSM or masses and bowel sounds normal  NEURO: Normal strength and tone with no weakness or sensory deficit noted, reflexes normal   SKIN: no suspicious lesions or rashes    ASSESSMENT/PLAN:    (M54.5) Lumbar pain  (primary encounter diagnosis)  MDM: Acute lumbar muscle strain. Patient endorses verónica spasms that increase pain that is clearly provoked by certain movements and clearly better at rest. Brief, seconds in duration right lower leg tingling but no prolonged radiculopathy. No cauda equina symptoms. No  systemic sxs to suggest other occult etiologies at this time. Plan is as per outlined below.   Plan: naproxen (NAPROSYN) 500 MG tablet,         cyclobenzaprine (FLEXERIL) 10 MG tablet        November 19, 2020 [facility] Plan:       1.  Start the prescription anti-inflammatory/pain relieving medication (Naproxen Sodium) I prescribed for you. Do not take over-the-counter Ibuprofen/Motrin/Advil or Aleve while taking this medication. You may take over-the-counter Tylenol as needed for breakthrough pain.     2. Take the Flexeril muscle relaxer three times a day as tolerated.   As we discussed this medication can make some people drowsy. Do not drive while taking this medication until you see how this medication effects you personally. You may want to take a 1/2 tablet during the day and a whole tab at night.      3. Follow-up with your primary care provider or orthopedic doctor for evaluation if your symptom change, worsen or fail to fully resolve with the treatment provided here today.     4. I am not anticipating any of the below symptoms.  However, if you develop the below symptoms, you should present directly to the emergency room:       You have a sudden change in your ability to control? your bladder or bowels.    You begin to feel numbness and tingling in your groin     The pain spreads down your leg and into your foot that does not go away with moving positions.    Your toes, feet or leg muscles begin to feel weak.    You feel generally unwell, develop a fever or sick.    Your pain suddenly gets worse.     You develop sudden onset abdominal pain

## 2020-12-14 ENCOUNTER — COMMUNICATION - HEALTHEAST (OUTPATIENT)
Dept: FAMILY MEDICINE | Facility: CLINIC | Age: 55
End: 2020-12-14

## 2020-12-14 ENCOUNTER — HEALTH MAINTENANCE LETTER (OUTPATIENT)
Age: 55
End: 2020-12-14

## 2020-12-14 DIAGNOSIS — F41.0 PANIC ATTACKS: ICD-10-CM

## 2020-12-14 DIAGNOSIS — G47.00 INSOMNIA: ICD-10-CM

## 2021-01-19 ENCOUNTER — COMMUNICATION - HEALTHEAST (OUTPATIENT)
Dept: FAMILY MEDICINE | Facility: CLINIC | Age: 56
End: 2021-01-19

## 2021-01-19 DIAGNOSIS — K21.9 GERD WITHOUT ESOPHAGITIS: ICD-10-CM

## 2021-01-20 ENCOUNTER — COMMUNICATION - HEALTHEAST (OUTPATIENT)
Dept: FAMILY MEDICINE | Facility: CLINIC | Age: 56
End: 2021-01-20

## 2021-01-20 DIAGNOSIS — E78.2 MIXED HYPERLIPIDEMIA: ICD-10-CM

## 2021-01-25 ENCOUNTER — COMMUNICATION - HEALTHEAST (OUTPATIENT)
Dept: SCHEDULING | Facility: CLINIC | Age: 56
End: 2021-01-25

## 2021-02-09 ENCOUNTER — OFFICE VISIT - HEALTHEAST (OUTPATIENT)
Dept: FAMILY MEDICINE | Facility: CLINIC | Age: 56
End: 2021-02-09

## 2021-02-09 DIAGNOSIS — M77.12 LATERAL EPICONDYLITIS OF LEFT ELBOW: ICD-10-CM

## 2021-02-09 DIAGNOSIS — I10 BENIGN HYPERTENSION: ICD-10-CM

## 2021-02-09 DIAGNOSIS — E78.5 HYPERLIPIDEMIA, UNSPECIFIED HYPERLIPIDEMIA TYPE: ICD-10-CM

## 2021-02-09 DIAGNOSIS — G47.00 INSOMNIA: ICD-10-CM

## 2021-02-09 DIAGNOSIS — F41.0 PANIC ATTACKS: ICD-10-CM

## 2021-02-09 DIAGNOSIS — F41.1 GENERALIZED ANXIETY DISORDER: ICD-10-CM

## 2021-02-09 DIAGNOSIS — Z90.81 HISTORY OF SPLENECTOMY: ICD-10-CM

## 2021-02-09 LAB
ANION GAP SERPL CALCULATED.3IONS-SCNC: 9 MMOL/L (ref 5–18)
BUN SERPL-MCNC: 14 MG/DL (ref 8–22)
CALCIUM SERPL-MCNC: 8.9 MG/DL (ref 8.5–10.5)
CHLORIDE BLD-SCNC: 109 MMOL/L (ref 98–107)
CO2 SERPL-SCNC: 25 MMOL/L (ref 22–31)
CREAT SERPL-MCNC: 0.86 MG/DL (ref 0.7–1.3)
GFR SERPL CREATININE-BSD FRML MDRD: >60 ML/MIN/1.73M2
GLUCOSE BLD-MCNC: 117 MG/DL (ref 70–125)
POTASSIUM BLD-SCNC: 4 MMOL/L (ref 3.5–5)
SODIUM SERPL-SCNC: 143 MMOL/L (ref 136–145)

## 2021-02-09 ASSESSMENT — ANXIETY QUESTIONNAIRES
1. FEELING NERVOUS, ANXIOUS, OR ON EDGE: SEVERAL DAYS
3. WORRYING TOO MUCH ABOUT DIFFERENT THINGS: SEVERAL DAYS
7. FEELING AFRAID AS IF SOMETHING AWFUL MIGHT HAPPEN: SEVERAL DAYS
IF YOU CHECKED OFF ANY PROBLEMS ON THIS QUESTIONNAIRE, HOW DIFFICULT HAVE THESE PROBLEMS MADE IT FOR YOU TO DO YOUR WORK, TAKE CARE OF THINGS AT HOME, OR GET ALONG WITH OTHER PEOPLE: SOMEWHAT DIFFICULT
2. NOT BEING ABLE TO STOP OR CONTROL WORRYING: SEVERAL DAYS
5. BEING SO RESTLESS THAT IT IS HARD TO SIT STILL: SEVERAL DAYS
GAD7 TOTAL SCORE: 7
4. TROUBLE RELAXING: SEVERAL DAYS
6. BECOMING EASILY ANNOYED OR IRRITABLE: SEVERAL DAYS

## 2021-02-09 ASSESSMENT — PATIENT HEALTH QUESTIONNAIRE - PHQ9: SUM OF ALL RESPONSES TO PHQ QUESTIONS 1-9: 8

## 2021-02-19 ENCOUNTER — COMMUNICATION - HEALTHEAST (OUTPATIENT)
Dept: FAMILY MEDICINE | Facility: CLINIC | Age: 56
End: 2021-02-19

## 2021-02-19 DIAGNOSIS — M77.12 LATERAL EPICONDYLITIS OF LEFT ELBOW: ICD-10-CM

## 2021-03-09 ENCOUNTER — AMBULATORY - HEALTHEAST (OUTPATIENT)
Dept: NURSING | Facility: CLINIC | Age: 56
End: 2021-03-09

## 2021-03-09 DIAGNOSIS — I10 BENIGN HYPERTENSION: ICD-10-CM

## 2021-03-14 ENCOUNTER — COMMUNICATION - HEALTHEAST (OUTPATIENT)
Dept: FAMILY MEDICINE | Facility: CLINIC | Age: 56
End: 2021-03-14

## 2021-03-14 DIAGNOSIS — I10 BENIGN HYPERTENSION: ICD-10-CM

## 2021-05-03 ENCOUNTER — OFFICE VISIT - HEALTHEAST (OUTPATIENT)
Dept: FAMILY MEDICINE | Facility: CLINIC | Age: 56
End: 2021-05-03

## 2021-05-03 DIAGNOSIS — M77.12 LEFT LATERAL EPICONDYLITIS: ICD-10-CM

## 2021-05-03 DIAGNOSIS — G47.00 INSOMNIA: ICD-10-CM

## 2021-05-03 DIAGNOSIS — F41.0 PANIC ATTACKS: ICD-10-CM

## 2021-05-03 DIAGNOSIS — F32.1 MODERATE MAJOR DEPRESSION (H): ICD-10-CM

## 2021-05-03 DIAGNOSIS — Z90.81 HISTORY OF SPLENECTOMY: ICD-10-CM

## 2021-05-03 DIAGNOSIS — I10 BENIGN HYPERTENSION: ICD-10-CM

## 2021-05-03 DIAGNOSIS — F41.1 GENERALIZED ANXIETY DISORDER: ICD-10-CM

## 2021-05-03 ASSESSMENT — ANXIETY QUESTIONNAIRES
3. WORRYING TOO MUCH ABOUT DIFFERENT THINGS: SEVERAL DAYS
IF YOU CHECKED OFF ANY PROBLEMS ON THIS QUESTIONNAIRE, HOW DIFFICULT HAVE THESE PROBLEMS MADE IT FOR YOU TO DO YOUR WORK, TAKE CARE OF THINGS AT HOME, OR GET ALONG WITH OTHER PEOPLE: SOMEWHAT DIFFICULT
5. BEING SO RESTLESS THAT IT IS HARD TO SIT STILL: MORE THAN HALF THE DAYS
6. BECOMING EASILY ANNOYED OR IRRITABLE: MORE THAN HALF THE DAYS
2. NOT BEING ABLE TO STOP OR CONTROL WORRYING: SEVERAL DAYS
4. TROUBLE RELAXING: MORE THAN HALF THE DAYS
1. FEELING NERVOUS, ANXIOUS, OR ON EDGE: SEVERAL DAYS
GAD7 TOTAL SCORE: 10
7. FEELING AFRAID AS IF SOMETHING AWFUL MIGHT HAPPEN: SEVERAL DAYS

## 2021-05-03 ASSESSMENT — PATIENT HEALTH QUESTIONNAIRE - PHQ9: SUM OF ALL RESPONSES TO PHQ QUESTIONS 1-9: 16

## 2021-05-27 ENCOUNTER — RECORDS - HEALTHEAST (OUTPATIENT)
Dept: ADMINISTRATIVE | Facility: CLINIC | Age: 56
End: 2021-05-27

## 2021-05-27 VITALS — SYSTOLIC BLOOD PRESSURE: 128 MMHG | OXYGEN SATURATION: 97 % | HEART RATE: 65 BPM | DIASTOLIC BLOOD PRESSURE: 82 MMHG

## 2021-05-27 ASSESSMENT — PATIENT HEALTH QUESTIONNAIRE - PHQ9
SUM OF ALL RESPONSES TO PHQ QUESTIONS 1-9: 4
SUM OF ALL RESPONSES TO PHQ QUESTIONS 1-9: 16
SUM OF ALL RESPONSES TO PHQ QUESTIONS 1-9: 6
SUM OF ALL RESPONSES TO PHQ QUESTIONS 1-9: 8

## 2021-05-27 NOTE — TELEPHONE ENCOUNTER
Controlled Substance Refill Request  Medication:   Requested Prescriptions     Pending Prescriptions Disp Refills     ALPRAZolam (XANAX) 0.5 MG tablet [Pharmacy Med Name: ALPRAZOLAM 0.5MG TABLETS] 30 tablet 0     Sig: TAKE 1 TABLET(0.5 MG) BY MOUTH AT BEDTIME AS NEEDED FOR SLEEP     Date Last Fill: 12/12/18  Pharmacy: [id number]   Submit electronically to pharmacy  Controlled Substance Agreement on File:   Encounter-Level CSA Scan Date - 12/21/2016:    Scan on 12/21/2016 (below)             Encounter-Level CSA Scan Date - 02/18/2016:    Scan on 2/18/2016: HE (below)         Last office visit: Last office visit pertaining to requested medication was 8/28/18.

## 2021-05-28 ASSESSMENT — ANXIETY QUESTIONNAIRES
GAD7 TOTAL SCORE: 10
GAD7 TOTAL SCORE: 7
GAD7 TOTAL SCORE: 6
GAD7 TOTAL SCORE: 7

## 2021-05-28 NOTE — TELEPHONE ENCOUNTER
Fax received from [facility] pharmacy requesting Prior Authorization    Medication Name:   esomeprazole (NEXIUM) 40 MG capsule 90 capsule 1 4/30/2019     Sig - Route: Take 1 capsule (40 mg total) by mouth daily. - Oral          Insurance Plan:    LOUIE: Yves   Patient ID Number: [id number]    Please start PA process

## 2021-05-28 NOTE — PROGRESS NOTES
SUBJECTIVE: Elvin Trivedi is a 53 y.o. White or  male who presents today for follow anxiety and depression.  His last visit with me was August 28.  Patient states he is doing well and denies increase in depression or anxiety symptoms.  He continues to take Prozac 20 mg daily and Alprazolam 0.5 mg as needed.  He is taking both medications appropriately.  His PHQ 9 score today is 6 and SILVIA 7 score is 7.  A new CSA was signed today for Alprazolam 1.5 mg #30 per 30 days.  He denies difficultly with sleep.  He has removed himself from a stressful situation regarding the purchase of a house with another individual.  He is walking his dog as much as possible.  His wife is currently in [place] with her mother who is going through a difficult period of time.  This is the second time she has had to travel to [place].  He continues to take Nexium for GERD and states it is working well as long as he remembers to take it.  He does have hypertension and takes Losartan 100 mg daily.  His blood pressure is well controlled with this.  He discontinued his daily aspirin.  He is due for a colonoscopy.  We talked about it at his last visit and he has not done it yet due to work requirements.  He says he will do it over the summer.  He also has some plantars warts on his right index finger and thumb that we have been treating.  He would like to have this done again today.  Plantars warts were pared down with 15 blade scalpel, a drop of cantharidin was applied to each wart and covered with a band aid.       OBJECTIVE: /84 (Patient Site: Right Arm, Patient Position: Sitting, Cuff Size: Adult Regular)   Pulse 67   Temp 98  F (36.7  C) (Oral)   Wt 164 lb 6.4 oz (74.6 kg)   SpO2 97%   BMI 25.37 kg/m    General: Normal weight, middle-aged gentleman in no acute distress  Heart: Regular rate and rhythm, no murmur  Lungs: Clear bilaterally, regular and unlabored respiratory effort  Abdomen: Soft,  non-distened  Extremities: Warm, dry, without edema.  Plantars warts on right index finger and thumb  Psych: Flat affect, answers questions appropriately    ASSESSMENT & PLAN:    1. Common wart     2. Benign hypertension  Comprehensive Metabolic Panel   3. Hyperlipidemia, unspecified hyperlipidemia type  Lipid Cascade   4. Panic attacks  ALPRAZolam (XANAX) 0.5 MG tablet   5. Insomnia  ALPRAZolam (XANAX) 0.5 MG tablet   6. Controlled substance agreement signed     7. Generalized anxiety disorder     8. Moderate major depression (H)     9. Gastroesophageal reflux disease without esophagitis  esomeprazole (NEXIUM) 40 MG capsule   10. Vitamin D deficiency  Vitamin D, Total (25-Hydroxy)   11. Screening for colon cancer  Ambulatory referral for Colonoscopy     Instructed to leave the band aids on the warts for 6 hours, then remove and wash hands.  I would like to see him again in about a month to recheck the plantars warts and see if they needed to be treated again.  I have placed an order for colonoscopy and stressed the importance of having this screening done.  A new CSA for Alprazolam 0.5 mg #30 in 30 days was signed.  Will continue on current medications.  Will check above labs and notify patient of results via mail, will call with grossly abnormal results.  Otherwise, follow up in 6 months for medication check.     Patient Active Problem List   Diagnosis     Generalized anxiety disorder     History of splenectomy     Hyperlipidemia     GERD without esophagitis     Controlled substance agreement signed     Benign hypertension     Insomnia     Common wart     Moderate major depression (H)       Current Outpatient Medications on File Prior to Visit   Medication Sig Dispense Refill     atorvastatin (LIPITOR) 10 MG tablet Take 1 tablet (10 mg total) by mouth daily. 90 tablet 3     FLUoxetine (PROZAC) 20 MG capsule TAKE 1 CAPSULE(20 MG) BY MOUTH DAILY 90 capsule 2     losartan (COZAAR) 100 MG tablet TAKE 1 TABLET(100 MG)  BY MOUTH DAILY 90 tablet 2     omeprazole (PRILOSEC) 40 MG capsule TAKE 1 CAPSULE(40 MG) BY MOUTH DAILY 90 capsule 1     No current facility-administered medications on file prior to visit.

## 2021-05-29 ENCOUNTER — RECORDS - HEALTHEAST (OUTPATIENT)
Dept: ADMINISTRATIVE | Facility: CLINIC | Age: 56
End: 2021-05-29

## 2021-05-30 VITALS — WEIGHT: 167 LBS | BODY MASS INDEX: 25.77 KG/M2

## 2021-05-30 VITALS — WEIGHT: 169 LBS | HEIGHT: 68 IN | BODY MASS INDEX: 25.61 KG/M2

## 2021-05-30 VITALS — BODY MASS INDEX: 26.29 KG/M2 | WEIGHT: 170.4 LBS

## 2021-05-30 NOTE — TELEPHONE ENCOUNTER
Refill Approved    Rx renewed per Medication Renewal Policy. Medication was last renewed on 1/11/19.    Cinthia A Tre, [facility] Triage/Med Refill 7/22/2019     Requested Prescriptions   Pending Prescriptions Disp Refills     omeprazole (PRILOSEC) 40 MG capsule [Pharmacy Med Name: OMEPRAZOLE 40MG CAPSULES] 90 capsule 0     Sig: TAKE 1 CAPSULE(40 MG) BY MOUTH DAILY       GI Medications Refill Protocol Passed - 7/22/2019 11:31 AM        Passed - PCP or prescribing provider visit in last 12 or next 3 months.     Last office visit with prescriber/PCP: 4/30/2019 Ros Griffith MD OR same dept: 4/30/2019 Ros Griffith MD OR same specialty: 4/30/2019 Ros Griffith MD  Last physical: Visit date not found Last MTM visit: Visit date not found   Next visit within 3 mo: Visit date not found  Next physical within 3 mo: Visit date not found  Prescriber OR PCP: Shine) SHARON Griffith MD  Last diagnosis associated with med order: 1. GERD without esophagitis  - omeprazole (PRILOSEC) 40 MG capsule [Pharmacy Med Name: OMEPRAZOLE 40MG CAPSULES]; TAKE 1 CAPSULE(40 MG) BY MOUTH DAILY  Dispense: 90 capsule; Refill: 0    If protocol passes may refill for 12 months if within 3 months of last provider visit (or a total of 15 months).

## 2021-05-31 VITALS — BODY MASS INDEX: 24.55 KG/M2 | HEIGHT: 68 IN | WEIGHT: 162 LBS

## 2021-05-31 VITALS — BODY MASS INDEX: 25.4 KG/M2 | WEIGHT: 164.6 LBS

## 2021-06-01 VITALS — BODY MASS INDEX: 25.11 KG/M2 | WEIGHT: 162.7 LBS

## 2021-06-01 VITALS — BODY MASS INDEX: 24.35 KG/M2 | WEIGHT: 157.8 LBS

## 2021-06-01 NOTE — TELEPHONE ENCOUNTER
Controlled Substance Refill Request  Medication:   Requested Prescriptions     Pending Prescriptions Disp Refills     ALPRAZolam (XANAX) 0.5 MG tablet [Pharmacy Med Name: ALPRAZOLAM 0.5MG TABLETS] 30 tablet 0     Sig: TAKE 1 TABLET BY MOUTH AS NEEDED SLEEP OR ANXIETY     Date Last Fill: 6/26 19  Pharmacy:  [id number]  Submit electronically to pharmacy  Controlled Substance Agreement on File:   Encounter-Level CSA Scan Date - 12/21/2016:    Scan on 12/21/2016 (below)             Encounter-Level CSA Scan Date - 02/18/2016:    Scan on 2/18/2016: HE (below)         Last office visit: Last office visit pertaining to requested medication was 4/30/19 .

## 2021-06-01 NOTE — TELEPHONE ENCOUNTER
Refill Approved    Rx renewed per Medication Renewal Policy. Medication was last renewed on 12/12/18.    Ramona Paredes, [facility] Triage/Med Refill 9/24/2019     Requested Prescriptions   Pending Prescriptions Disp Refills     losartan (COZAAR) 100 MG tablet [Pharmacy Med Name: LOSARTAN 100MG TABLETS] 90 tablet 0     Sig: TAKE 1 TABLET(100 MG) BY MOUTH DAILY       Angiotensin Receptor Blocker Protocol Passed - 9/23/2019  7:41 PM        Passed - PCP or prescribing provider visit in past 12 months       Last office visit with prescriber/PCP: 4/30/2019 Ros Griffith MD OR same dept: 4/30/2019 Ros Griffith MD OR same specialty: 4/30/2019 Ros Griffith MD  Last physical: Visit date not found Last MTM visit: Visit date not found   Next visit within 3 mo: Visit date not found  Next physical within 3 mo: Visit date not found  Prescriber OR PCP: Ros Griffith MD ([facility])  Last diagnosis associated with med order: 1. Benign hypertension  - losartan (COZAAR) 100 MG tablet [Pharmacy Med Name: LOSARTAN 100MG TABLETS]; TAKE 1 TABLET(100 MG) BY MOUTH DAILY  Dispense: 90 tablet; Refill: 0    If protocol passes may refill for 12 months if within 3 months of last provider visit (or a total of 15 months).             Passed - Serum potassium within the past 12 months     Lab Results   Component Value Date    Potassium 4.4 04/30/2019             Passed - Blood pressure filed in past 12 months     BP Readings from Last 1 Encounters:   04/30/19 136/84             Passed - Serum creatinine within the past 12 months     Creatinine   Date Value Ref Range Status   04/30/2019 0.90 0.70 - 1.30 mg/dL Final

## 2021-06-03 VITALS — BODY MASS INDEX: 25.37 KG/M2 | WEIGHT: 164.4 LBS

## 2021-06-04 VITALS
HEIGHT: 67 IN | BODY MASS INDEX: 25.74 KG/M2 | DIASTOLIC BLOOD PRESSURE: 80 MMHG | HEART RATE: 68 BPM | SYSTOLIC BLOOD PRESSURE: 138 MMHG | WEIGHT: 164 LBS

## 2021-06-04 VITALS
OXYGEN SATURATION: 97 % | SYSTOLIC BLOOD PRESSURE: 128 MMHG | DIASTOLIC BLOOD PRESSURE: 82 MMHG | BODY MASS INDEX: 24.48 KG/M2 | WEIGHT: 156 LBS | RESPIRATION RATE: 18 BRPM | HEIGHT: 67 IN | HEART RATE: 66 BPM

## 2021-06-04 NOTE — TELEPHONE ENCOUNTER
New Appointment Needed  What is the reason for the visit:    Same Date/Next Day Appt Request  What is the reason for your visit?: Medication check. Patient was at clinic today at 10:00 for 10:20 appointment.  He waited will after his appointment time to be seen but had to leave or would be late for work. Boss will let him leave today if he can get into the clinic.     Provider Preference: PCP only  How soon do you need to be seen?: today  Waitlist offered?: No  Okay to leave a detailed message:  Yes

## 2021-06-04 NOTE — TELEPHONE ENCOUNTER
Controlled Substance Refill Request  Medication:   Requested Prescriptions     Pending Prescriptions Disp Refills     FLUoxetine (PROZAC) 20 MG capsule [Pharmacy Med Name: FLUOXETINE 20MG CAPSULES] 90 capsule 0     Sig: TAKE 1 CAPSULE(20 MG) BY MOUTH DAILY     ALPRAZolam (XANAX) 0.5 MG tablet [Pharmacy Med Name: ALPRAZOLAM 0.5MG TABLETS] 30 tablet 0     Sig: TAKE 1 TABLET BY MOUTH EVERY DAY AS NEEDED FOR SLEEP OR ANXIETY     Date Last Fill: 9/16/19  Pharmacy: [id number]   Submit electronically to pharmacy  Controlled Substance Agreement on File:   Encounter-Level CSA Scan Date - 08/28/2018:    Scan on 9/5/2018  4:56 PM           Encounter-Level CSA Scan Date - 12/21/2016:    Scan on 12/21/2016           Encounter-Level CSA Scan Date - 02/18/2016:    Scan on 2/18/2016: HE       Last office visit: Last office visit pertaining to requested medication was 4/30/19.    Rx renewed per Medication Renewal policy  fluoxetine

## 2021-06-04 NOTE — TELEPHONE ENCOUNTER
Pt notified and states that he started a new job so he will have to call back and schedule appointment once he knows his schedule.     Ruby Beverly, CMA

## 2021-06-04 NOTE — TELEPHONE ENCOUNTER
Last Med Check: 4/30/19.    Next med check due on: Was due in October.     CSA on File: 4/30/19.    Future Appointment Scheduled ? No.     Ruby Beverly, [facility]

## 2021-06-04 NOTE — TELEPHONE ENCOUNTER
New Appointment Needed  What is the reason for the visit:    Med check/blood work  Provider Preference: PCP only  How soon do you need to be seen?: would like to be seen on 12/30 first thing in the morning  Waitlist offered?: No  Okay to leave a detailed message:  Yes

## 2021-06-04 NOTE — TELEPHONE ENCOUNTER
Overdue for med check.  Please call the patient to help schedule before she runs out of her prescription of alprazolam.    Iron Mendoza CNP

## 2021-06-04 NOTE — TELEPHONE ENCOUNTER
Can you please reschedule this patient?  I do not believe there is any hurry, just do it at his earliest convenience and my earliest opening.

## 2021-06-05 VITALS
DIASTOLIC BLOOD PRESSURE: 74 MMHG | WEIGHT: 153 LBS | OXYGEN SATURATION: 97 % | HEART RATE: 64 BPM | SYSTOLIC BLOOD PRESSURE: 126 MMHG | BODY MASS INDEX: 23.96 KG/M2

## 2021-06-05 VITALS
DIASTOLIC BLOOD PRESSURE: 90 MMHG | WEIGHT: 160.5 LBS | BODY MASS INDEX: 25.14 KG/M2 | SYSTOLIC BLOOD PRESSURE: 152 MMHG | HEART RATE: 64 BPM | OXYGEN SATURATION: 98 %

## 2021-06-05 NOTE — PROGRESS NOTES
SUBJECTIVE: Elvin Trivedi is a 54 y.o. White or  male who presents today for his 6-month check.  When I saw him last I treated his warts which he has on his hand that bug him.  He had had them for a long time.  He has not had me treat them a couple times before over the last couple of years.  He said he was never charged anything extra for treatment before but this last time his insurance made him pay the whole cost of treatment and he was not happy about that.  It cost him over $200.  He tried to find out why he had to pay this time but they would not give in and cover it.  I went issue his visits and looked at how I coded for the procedure.  I coded as destruction of benign lesions exactly the way I had before.  He has a history of hypertension which is marginally controlled on losartan 100 mg. We are watching this.  He does not want another pill unless absolutely necessary.  He also has mild hyperlipidemia and is on 10 mg of Lipitor and is fasting for lab work.  He has reflux and takes Nexium on a regular basis.  He has not had an EGD.  He has had quite a lot of anxiety and depression.  He will get panic attacks and is sometimes unable to sleep due to his anxiety.  We have a controlled substance agreement which was done on 5/2/2019 for alprazolam 0.5 mg.  Today he was given a PHQ 9 and scored 6.  He scored 7 on a SILVIA 7 today.  He has a history of a splenectomy and I tell him it is very important because of this to have his flu shot.  We also discussed he may need some other immunizations as well.  We never got records from his previous clinic as to what he has had for immunizations needed for this indication.  He should be sure to have meningococcal vaccines.  We discussed he should call [facility] and ask them to send him a list of immunizations which he did had with them.  He can drop it at our clinic.    OBJECTIVE: /80 (Patient Site: Left Arm, Patient Position: Sitting, Cuff Size: Adult Regular)   " Pulse 68   Ht 5' 7\" (1.702 m)   Wt 164 lb (74.4 kg)   BMI 25.69 kg/m    General: Healthy-appearing normal weight middle-aged gentleman in no acute distress  Heart: Regular rate and rhythm without murmur  Lungs: Clear bilaterally  Abdomen: Soft, nontender  Extremities: Warm, dry and without edema  Psych: Patient has flat affect    ASSESSMENT & PLAN:     1. Benign hypertension  Currently well enough controlled.  Will re-fill his medication and monitor labs.  - Basic Metabolic Panel  - losartan (COZAAR) 100 MG tablet; Take 1 tablet (100 mg total) by mouth daily.  Dispense: 90 tablet; Refill: 2    2. Hyperlipidemia, unspecified hyperlipidemia type  Needs monitoring with labs.  - Lipid Cascade    3. Panic attacks  Needs refill of his alprazolam today.  - ALPRAZolam (XANAX) 0.5 MG tablet; TAKE 1 TABLET BY MOUTH EVERY DAY AS NEEDED FOR SLEEP OR ANXIETY  Dispense: 30 tablet; Refill: 0    4. Insomnia  - ALPRAZolam (XANAX) 0.5 MG tablet; TAKE 1 TABLET BY MOUTH EVERY DAY AS NEEDED FOR SLEEP OR ANXIETY  Dispense: 30 tablet; Refill: 0    5. Controlled substance agreement signed  Valid CSA from 5/2/2019 on his chart.    6. Moderate major depression (H)  Not really completely controlled on his fluoxetine 20 mg but does not want to increase his medication.    7. GERD without esophagitis  Controlled on his Nexium.    8. History of splenectomy  I gave the patient a handout on what immunizations are recommended for people who have had a splenectomy or are functionally asplenic.  He is to get his records from his past clinic and let us know what he needs.  He will have his flu shot today.  - Influenza, Seasonal Quad, PF =/> 6months    I will get back to him on his lab results by mail and only call with grossly abnormal values.  He was given his flu shot today.  He should see me back in May for his next med check and CSA update.    Patient Active Problem List   Diagnosis     Generalized anxiety disorder     History of splenectomy "     Hyperlipidemia     GERD without esophagitis     Controlled substance agreement signed     Benign hypertension     Insomnia     Common wart     Moderate major depression (H)       Current Outpatient Medications on File Prior to Visit   Medication Sig Dispense Refill     atorvastatin (LIPITOR) 10 MG tablet TAKE 1 TABLET(10 MG) BY MOUTH DAILY 90 tablet 1     esomeprazole (NEXIUM) 40 MG capsule Take 1 capsule (40 mg total) by mouth daily. 90 capsule 1     FLUoxetine (PROZAC) 20 MG capsule TAKE 1 CAPSULE(20 MG) BY MOUTH DAILY 90 capsule 1     omeprazole (PRILOSEC) 40 MG capsule TAKE 1 CAPSULE(40 MG) BY MOUTH DAILY 90 capsule 3     No current facility-administered medications on file prior to visit.

## 2021-06-07 NOTE — TELEPHONE ENCOUNTER
Refill Approved    Rx renewed per Medication Renewal Policy. Medication was last renewed on 10/29/19.    Ramona Paredes, [facility] Triage/Med Refill 4/23/2020     Requested Prescriptions   Pending Prescriptions Disp Refills     atorvastatin (LIPITOR) 10 MG tablet [Pharmacy Med Name: ATORVASTATIN 10MG TABLETS] 90 tablet 1     Sig: TAKE 1 TABLET(10 MG) BY MOUTH DAILY       Statins Refill Protocol (Hmg CoA Reductase Inhibitors) Passed - 4/22/2020  5:22 PM        Passed - PCP or prescribing provider visit in past 12 months      Last office visit with prescriber/PCP: 1/14/2020 Ros Griffith MD OR same dept: 1/14/2020 Ros Griffith MD OR same specialty: 1/14/2020 Rso Griffith MD  Last physical: Visit date not found Last MTM visit: Visit date not found   Next visit within 3 mo: Visit date not found  Next physical within 3 mo: Visit date not found  Prescriber OR PCP: Ros Griffith MD ([place])  Last diagnosis associated with med order: 1. Mixed hyperlipidemia  - atorvastatin (LIPITOR) 10 MG tablet [Pharmacy Med Name: ATORVASTATIN 10MG TABLETS]; TAKE 1 TABLET(10 MG) BY MOUTH DAILY  Dispense: 90 tablet; Refill: 1    If protocol passes may refill for 12 months if within 3 months of last provider visit (or a total of 15 months).

## 2021-06-07 NOTE — TELEPHONE ENCOUNTER
Hx splenectomy    Notice today, service dept. [employer] - co-worker COVID +    Care advice as noted.     COVID 19 Nurse Triage Plan/Patient Instructions    Please be aware that novel coronavirus (COVID-19) may be circulating in the community. If you develop symptoms such as fever, cough, or SOB or if you have concerns about the presence of another infection including coronavirus (COVID-19), please contact your health care provider or visit [email or web address].     Disposition/Instructions    Patient to have an OnCare Visit with a provider (Preferred option). Follow System Ambulatory Workflow for COVID 19. It is recommended that you setup an OnCare Visit with one of our virtual providers.  To do this follow these instructions:    1. Go to the website [email or web address]  2. Create an account (you will need your insurance information)  3. Start a new visit  4. Choose your diagnosis (e.g. COVID19)  5. Fill out the information about your symptoms  6. A provider will reach out to you by text, phone call or video visit based on your request    Call Back If: Your symptoms worsen before you are able to complete your OnCare Visit with a provider.    Thank you for limiting contact with others, wearing a simple mask to cover your cough, practice good hand hygiene habits and accessing our virtual services where possible to limit the spread of this virus.    For more information about COVID19 and options for caring for yourself at home, please visit the CDC website at https://www.cdc.gov/coronavirus/2019-ncov/about/steps-when-sick.html  For more options for care at [facility], please visit our website at [email or web address]    For more information, please use the [place] Department of Health COVID-19 Website: https://www.health.state.[place]/diseases/coronavirus/index.html  [facility] ([facility]) COVID-19 Hotlines (Interpreters available):      Health questions: Phone Number:  [phone] or [phone] and Hours: 7 a.m. to 7 p.m.    Schools and  questions: Phone Number: [phone] or [phone] and Hours 7 a.m. to 7 p.m.    Lorene Dasilva RN    Reason for Disposition    [1] COVID-19 EXPOSURE (Close Contact) within last 14 days AND [2] NO cough, fever, or breathing difficulty    Protocols used: CORONAVIRUS (COVID-19) EXPOSURE-A- 4.22.20

## 2021-06-07 NOTE — TELEPHONE ENCOUNTER
Last Med Check: 1/14/20 Nacho    Next med check due on: 5/14/20    CSA on File: 5/2/2019    Future Appointment Scheduled ? no    Last Med Refill? 1/14/20 #30 Nacho Scott

## 2021-06-07 NOTE — TELEPHONE ENCOUNTER
Controlled Substance Refill Request  Medication Name:   Requested Prescriptions     Pending Prescriptions Disp Refills     ALPRAZolam (XANAX) 0.5 MG tablet 30 tablet 0     Sig: TAKE 1 TABLET BY MOUTH EVERY DAY AS NEEDED FOR SLEEP OR ANXIETY     Date Last Fill: 1/14/20  [facility]  Submit electronically to pharmacy  Controlled Substance Agreement on file:   Encounter-Level CSA Scan Date - 08/28/2018:    Scan on 9/5/2018  4:56 PM           Encounter-Level CSA Scan Date - 12/21/2016:    Scan on 12/21/2016           Encounter-Level CSA Scan Date - 02/18/2016:    Scan on 2/18/2016: HE        Last office visit:  1/14/20

## 2021-06-08 NOTE — PROGRESS NOTES
Assessment:  #1.  Chronic heartburn, gastroesophageal reflux, no esophagitis was seen on EGD earlier in 2016 but he was on omeprazole at the time.  #2.  Hypertension, needs treatment.  3.  Chronic anxiety.  #4.  Hyperlipidemia.    Plan: Check fasting lipid, basic profile, hemoglobin and TSH for the above.  Start losartan 50 mg-one by mouth daily #30 refillable.  Restart omeprazole 20 mg-one by mouth daily #30 refillable ×5.  I recommend he stop the baby aspirin at his it is more risk for his situation than benefit.  Follow-up in one month to see Dr. Aquino but earlier as needed.    Subjective: 51-year-old male presenting for follow-up on elevated blood pressure and heartburn issue.  He was scheduled to see Dr. Aquino but since she had to leave due to illness he was put in my schedule.  She will note in December when she had seen him.  He is fasting.  He notes that his father  in September and a week later his father-in-law  in in 3 weeks after that they had to put their dog to sleep.  He has significant stress at work and is considering leaving his job.  He has had chronic anxiety.  He does not use the alprazolam twice a day regular but only intermittently.  He notes that when he takes it it does reduce his anxiety but then he feels tired and sleepy.  Regarding hypertension no headaches dizziness or leg swelling.  He does have several blood pressure readings from outside the clinic and all of them are actually higher than today's reading.  In the past he had been given Lexapro some years ago and states he did not tolerate that and several other medicines that he did not tolerate.  Past Medical History   Diagnosis Date     Ruptured appendix 1976     Ruptured spleen      No Known Allergies  Current medications include aspirin 81 mg per day, ranitidine 300 mg daily at bedtime, and alprazolam 0.5 mg twice a day when necessary.  He notes he is not taking the ranitidine since he took it for 3 days and it did not  "help his symptoms so he just stopped it.  He restarted omeprazole that he got from a friend of his but only has 1 pill of that left.    Objective:  Visit Vitals     /88     Pulse 76     Resp 18     Ht 5' 7.5\" (1.715 m)     Wt 169 lb (76.7 kg)     BMI 26.08 kg/m2     HEENT examination negative.  Neck supple.  Lungs clear.  Heart regular rate and rhythm without murmur.  Abdomen shows no masses tenderness or hepatosplenomegaly.  No pedal edema.  He does appear anxious.  He does state that he is stop drinking caffeine.  PTH Q-9 score today is 11  "

## 2021-06-08 NOTE — PROGRESS NOTES
Assessment:  This is a 51 y.o.male who presents to follow-up on his essential hypertension.  Blood pressure has improved on the losartan but is still not at goal.  I think his anxiety is exacerbating his elevated blood pressure.      1. Essential hypertension with goal blood pressure less than 140/90  metoprolol succinate (TOPROL-XL) 25 MG   2. Generalized anxiety disorder     3. Reflux esophagitis  omeprazole (PRILOSEC) 40 MG capsule         Plan:   Because I think that his anxiety is contributing to his blood pressure, I am going to add a low-dose beta blocker to his regimen.  He was hoping to get a refill of alprazolam but I explained that the agreement was that he would use 60 tablets over a six-month period of time and I do not intend to increase that since it is a medication with significant potential for dependence and abuse.  I reviewed other medications for anxiety including the SSRIs and buspirone, but he said he had been tried on all of them and did not tolerate them or they did not work.  We will hope that the beta blocker will help to control some of his anxiety symptoms.  I discussed his PPI use.  I advised him that the heartburn he experienced when he was trying to stop the omeprazole was due to reflux hyperacidity which is a common effect of long-term PPI use.  He needs to wean himself down on the PPI.  He can alternate 40 mg with 20 mg every other day, then go to 20 mg daily, then 20 mg every other day, and so on.  He can use ranitidine in addition to the PPI to help with this transition.  I advised him that we need to see him back within a month or 2 since we have started a new blood pressure medication today.  He should continue to measure his blood pressure at home with his new home cuff.        Subjective:  This is a 51 y.o.male who presents for follow-up of his hypertension.  Dr. Cr saw him on January 18 and started him on losartan 50 mg daily.  He says he is taking it at night and is  "tolerating it well without any side effects.  He says prior to starting the medication he had systolic blood pressures ranging from the 160s all the way up to 192.  He has since bought a home blood pressure cuff but has only measured his blood pressure with it once.  He says he believes he got a number of 163 systolic.  He says he did not tolerate trying to discontinue his omeprazole.  After about 4 days off the omeprazole and on ranitidine, he says he got severe heartburn and took 5 Zantac in one day.  He also used Tums.  Dr. Cr restarted him on omeprazole but he is unhappy that only 20 mg was prescribed instead of 40 mg.  He is particularly worried about the bone thinning effect of PPIs and wonders how he can get off of the medication.  He had upper GI endoscopy in April which showed normal esophagus, stomach, and duodenum.  He therefore has no indication for using a long-term PPI.  He says he is under severe stress with the recent deaths of his father and father-in-law and the need to euthanize his dog.  He says his mother is \"on the brink\"; she has severe dementia.  He also says that work is extremely busy and that there is a lot of responsibility and stress.  He is using the alprazolam frequently and was hoping to get a refill.  When I saw him in December to establish care, our agreement was that he would only used 60 tablets in 6 months since he had previously only used it intermittently.  He says he has been tried on all of the SSRIs as well as BuSpar and they either caused intolerable side effects or worsening ineffective.  He argues that when he uses alprazolam it is very effective and he has immediate reduction of anxiety.  He does not understand that this is the exact reason why alprazolam has such a high risk of tolerance, overuse, and addiction.      Objective:      Visit Vitals     BP (!) 134/92 (Patient Site: Left Arm, Patient Position: Sitting, Cuff Size: Adult Large)     Pulse 70     Wt 167 lb " (75.8 kg)     BMI 25.77 kg/m2     History   Smoking Status     Former Smoker     Packs/day: 0.50     Types: Cigarettes     Quit date: 9/1/2015   Smokeless Tobacco     Never Used     Comment: Previously 5-6 cigs a day       Physical Exam:   He is alert, oriented ×3, in no distress.  He appears moderately anxious.    His SILVIA 7 score today is 13.  His PHQ-9 score is 4.  He denies suicidal ideation or plan.  He rates his daily functioning as somewhat difficult.          Current Outpatient Prescriptions on File Prior to Visit   Medication Sig Dispense Refill     ALPRAZolam (XANAX) 0.5 MG tablet TAKE 1 TABLET BY MOUTH TWICE DAILY AS NEEDED FOR ANXIETY 60 tablet 0     losartan (COZAAR) 50 MG tablet TAKE 1 TABLET(50 MG) BY MOUTH DAILY 90 tablet 0     aspirin 81 MG EC tablet Take 81 mg by mouth daily.       omeprazole (PRILOSEC) 20 MG capsule TAKE 1 CAPSULE(20 MG) BY MOUTH DAILY 90 capsule 1     ranitidine (ZANTAC) 300 MG tablet Take 1 tablet (300 mg total) by mouth bedtime. 90 tablet 3     No current facility-administered medications on file prior to visit.            Saniya Aquino M.D.      This note has been dictated using voice recognition software.  Any grammatical, typographical, or context distortions are unintentional and inherent to the software.

## 2021-06-08 NOTE — TELEPHONE ENCOUNTER
Refill Approved    Rx renewed per Medication Renewal Policy. Medication was last renewed on 12/12/19.1/14/20.    Ramona Paredes, [facility] Connection Triage/Med Refill 6/11/2020     Requested Prescriptions   Pending Prescriptions Disp Refills     FLUoxetine (PROZAC) 20 MG capsule [Pharmacy Med Name: FLUOXETINE 20MG CAPSULES] 90 capsule 1     Sig: TAKE 1 CAPSULE(20 MG) BY MOUTH DAILY       SSRI Refill Protocol  Passed - 6/9/2020  6:15 PM        Passed - PCP or prescribing provider visit in last year     Last office visit with prescriber/PCP: 1/14/2020 Ros Griffith MD OR same dept: 1/14/2020 Ros Griffith MD OR same specialty: 1/14/2020 Ros Griffith MD  Last physical: Visit date not found Last MTM visit: Visit date not found   Next visit within 3 mo: Visit date not found  Next physical within 3 mo: Visit date not found  Prescriber OR PCP: Ros Griffith MD ([facility])  Last diagnosis associated with med order: 1. Generalized anxiety disorder  - FLUoxetine (PROZAC) 20 MG capsule [Pharmacy Med Name: FLUOXETINE 20MG CAPSULES]; TAKE 1 CAPSULE(20 MG) BY MOUTH DAILY  Dispense: 90 capsule; Refill: 1    2. Benign hypertension  - losartan (COZAAR) 100 MG tablet [Pharmacy Med Name: LOSARTAN 100MG TABLETS]; TAKE 1 TABLET(100 MG) BY MOUTH DAILY  Dispense: 90 tablet; Refill: 2    If protocol passes may refill for 12 months if within 3 months of last provider visit (or a total of 15 months).                losartan (COZAAR) 100 MG tablet [Pharmacy Med Name: LOSARTAN 100MG TABLETS] 90 tablet 2     Sig: TAKE 1 TABLET(100 MG) BY MOUTH DAILY       Angiotensin Receptor Blocker Protocol Passed - 6/9/2020  6:15 PM        Passed - PCP or prescribing provider visit in past 12 months       Last office visit with prescriber/PCP: 1/14/2020 Ros Griffith MD OR same dept: 1/14/2020 Ros Griffith MD OR same specialty: 1/14/2020 Ros Griffith MD  Last physical: Visit date not found Last MTM  visit: Visit date not found   Next visit within 3 mo: Visit date not found  Next physical within 3 mo: Visit date not found  Prescriber OR PCP: Ros Griffith MD ([facility])  Last diagnosis associated with med order: 1. Generalized anxiety disorder  - FLUoxetine (PROZAC) 20 MG capsule [Pharmacy Med Name: FLUOXETINE 20MG CAPSULES]; TAKE 1 CAPSULE(20 MG) BY MOUTH DAILY  Dispense: 90 capsule; Refill: 1    2. Benign hypertension  - losartan (COZAAR) 100 MG tablet [Pharmacy Med Name: LOSARTAN 100MG TABLETS]; TAKE 1 TABLET(100 MG) BY MOUTH DAILY  Dispense: 90 tablet; Refill: 2    If protocol passes may refill for 12 months if within 3 months of last provider visit (or a total of 15 months).             Passed - Serum potassium within the past 12 months     Lab Results   Component Value Date    Potassium 4.4 01/14/2020             Passed - Blood pressure filed in past 12 months     BP Readings from Last 1 Encounters:   01/14/20 138/80             Passed - Serum creatinine within the past 12 months     Creatinine   Date Value Ref Range Status   01/14/2020 0.84 0.70 - 1.30 mg/dL Final

## 2021-06-09 NOTE — TELEPHONE ENCOUNTER
Please have the patient get a follow-up appointment with Dr. Griffith(PCP). Last visit with her was 1/2020 and needs to have follow-up appointment. I did send a refill.     Alberto Yao MD

## 2021-06-09 NOTE — TELEPHONE ENCOUNTER
Controlled Substance Refill Request  Medication Name:   Requested Prescriptions     Pending Prescriptions Disp Refills     ALPRAZolam (XANAX) 0.5 MG tablet [Pharmacy Med Name: ALPRAZOLAM 0.5MG TABLETS] 30 tablet 0     Sig: TAKE 1 TABLET BY MOUTH EVERY DAY AS NEEDED FOR SLEEP OR ANXIETY     Date Last Fill: 3/30/2020  Is patient out of medication?: N/A - electronic request  Patient notified refills processed within 3 business days: N/A - electronic request  [facility]  Submit electronically to pharmacy  Controlled Substance Agreement on file:   Encounter-Level CSA Scan Date - 08/28/2018:    Scan on 9/5/2018  4:56 PM           Encounter-Level CSA Scan Date - 12/21/2016:    Scan on 12/21/2016           Encounter-Level CSA Scan Date - 02/18/2016:    Scan on 2/18/2016: HE        Last office visit:  1/14/2020

## 2021-06-10 NOTE — PROGRESS NOTES
SUBJECTIVE: Elvin Trivedi is a 51 y.o.  male who presents today for a med update.  He is a patient of Dr. Cannon and has generalized anxiety disorder.  I have never met him before.  They had a substance agreement contract for 60 half milligram alprazolam every 3 months.  He complains that this is not enough to treat his anxiety.  We have a lengthy discussion about treating anxiety so that you do not need to use a benzodiazepine.  We had a discussion about how if you increase its use it becomes less effective due to tolerance and you will just need more and more.  He tells me all about how he does not like taking other meds and had side effects in the past when he was treated.  I tell him I will not be willing to increase his current dosing of alprazolam.  He talks about his stress at work and home and how he has poor sleep.  We discuss his high blood pressure and that he has never started the metoprolol XL 25 mg that he was given partially for anxiety control.  I tell him what the concept is behind this medication.  He is currently taking the losartan 50 mg which Dr. Cr started him on in January.  He has a history of GERD but has weaned off of omeprazole by Dr. Childress because she was worried about his bones.  He has blown this out of proportion.  He is not well controlled on the ranitidine that he was given.  He however does not seem to be particularly compliant with medication either.  I suggested that he could do over-the-counter Prilosec when needed.  He has a history of hyperlipidemia which is not being treated.  He admits he has a very strong coronary artery disease family history.  He tells me he does not want to be on a statin because of the side effects.  He overall does not want to be on any medicine besides alprazolam because he knows that works.    OBJECTIVE: /80  Pulse 60  Wt 170 lb 6.4 oz (77.3 kg)  BMI 26.29 kg/m2  General: Middle-aged male in no acute distress, poor  historian  Heart: Regular rate and rhythm without murmur  Lungs: Clear bilaterally  Abdomen: Soft, nontender  Extremities: Warm, dry and without edema  Psych: Anxious and suspicious    ASSESSMENT & PLAN:    1. Generalized anxiety disorder  Pain Clinic Survey, Urine    DISCONTINUED: FLUoxetine (PROZAC) 10 MG tablet   2. Benign hypertension  losartan (COZAAR) 50 MG tablet   3. GERD without esophagitis     4. Panic attacks  ALPRAZolam (XANAX) 0.5 MG tablet   5. Hyperlipidemia       After a lot of negotiating, I told him I would be agreeable to continuing his alprazolam treatment that he had with Dr. Aquino if he is consistent with taking fluoxetine 10 mg every single day for 2 months.  I will get some urine for a urine drug test from him.  I am renewing his losartan today and he is to restart his metoprolol.  I refilled his alprazolam for 60 tablets which is to last him 90 days.  He is to follow-up in 2 months time. 40 minutes spent together with the patient today, more than 50% spent in counseling, discussing the above topics.        Patient Active Problem List   Diagnosis     Generalized anxiety disorder     History of splenectomy     Hyperlipidemia     GERD without esophagitis     Controlled substance agreement signed     Benign hypertension       Current Outpatient Prescriptions on File Prior to Visit   Medication Sig Dispense Refill     omeprazole (PRILOSEC) 40 MG capsule TK ONE C PO ONCE D  3     aspirin 81 MG EC tablet Take 81 mg by mouth daily.       metoprolol succinate (TOPROL-XL) 25 MG TAKE 1 TABLET(25 MG) BY MOUTH DAILY 90 tablet 1     ranitidine (ZANTAC) 300 MG tablet Take 1 tablet (300 mg total) by mouth bedtime. 90 tablet 3     No current facility-administered medications on file prior to visit.

## 2021-06-10 NOTE — PROGRESS NOTES
SUBJECTIVE: Elvin Trivedi is a 51 y.o.  male who presents today for follow-up.  He has generalized anxiety disorder.  He was started on 10 mg of fluoxetine when I saw him last on 4/18/17.  At that time he had a SILVIA 7 score of 13.  He was given another SILVIA 7 and scored 7 today.  He thinks his anxiety is improved.  He also had a urine drug test at our last visit because he has been on half milligram Xanax for panic and insomnia.  The drug test was negative for benzodiazepines as he had not refilled it recently and was positive for THC.  He reports he really has not had panic in the last few weeks.  He is using Advil PM to sleep.  He thinks there is less conflict in the family.  He notes that there has not been as many outbursts and that he is able to try harder to not let things bother him.  He was having high blood pressure and is currently taking both losartan 50 mg as well as metoprolol 25 mg.  It is however questionable about his compliance with these medications.  We had checked some lab work as well showing some hyperlipidemia with an LDL of 163 which was down from 184 previously.  He has been trying to eat better and he thinks that has made a difference.  He prefers to hold off on treatment with a statin and instead give him 6 more months to see what he can get his cholesterol down to.  He is still having some trouble sleeping and we discussed trying to stay away from the Xanax for this purpose.  He has failed some other medications but he has never tried mirtazapine which might be the perfect med for him.  He is willing to try.  When I saw him last visit he showed me a couple warts he had and wants them treated today which I am glad to do.  They are small but they have not improved with conservative treatment over time.    OBJECTIVE: /80  Pulse 60  Wt 164 lb 9.6 oz (74.7 kg)  BMI 25.4 kg/m2  General: Healthy-appearing middle-aged gentleman in no acute distress  Heart: Regular rate and  rhythm without murmur  Lungs: Clear bilaterally  Abdomen: Soft, nontender  Extremities: Warm, dry and without edema  Psych: Patient appears to be much more level and in better spirits, less anxious than when I saw him last  Skin: He has 2 small warts, one on his right index finger and one on his thumb.    Procedure: I took a 15 blade and carefully shaved off any dead skin on these warts.  I then froze and thawed with cryotherapy 3 times per wart.  We discussed that there is to be expected inflammation but that this is part of the process of allowing his immune system to identify the virus and find it.  We discussed that if it has not completely gone away in 3 weeks he should come back for retreatment.  He tolerated the procedure well.    ASSESSMENT & PLAN:    1. Generalized anxiety disorder     2. Benign hypertension     3. Insomnia  mirtazapine (REMERON) 15 MG tablet   4. Hyperlipidemia, unspecified hyperlipidemia type     5. Common wart       I am going to start him on 15 mg of mirtazapine before bedtime to see if we can improve his insomnia which of course always helps mood and energy level as well.  He is going to continue to try to minimize his Xanax use.  He does not want to increase his Prozac dose at this time.  He will continue with his current blood pressure meds.  We will hold off on treating his hyperlipidemia.  He is to continue to watch his exercise and diet and then we can recheck his cholesterol in 6 months time.  He may need to return earlier for retreatment of his warts.    Patient Active Problem List   Diagnosis     Generalized anxiety disorder     History of splenectomy     Hyperlipidemia     GERD without esophagitis     Controlled substance agreement signed     Benign hypertension     Insomnia     Common wart       Current Outpatient Prescriptions on File Prior to Visit   Medication Sig Dispense Refill     ALPRAZolam (XANAX) 0.5 MG tablet TAKE 1 TABLET BY MOUTH TWICE DAILY AS NEEDED FOR ANXIETY  30 tablet 0     aspirin 81 MG EC tablet Take 81 mg by mouth daily.       FLUoxetine (PROZAC) 10 MG tablet TAKE 1 TABLET(10 MG) BY MOUTH DAILY 90 tablet 0     losartan (COZAAR) 50 MG tablet TAKE 1 TABLET(50 MG) BY MOUTH DAILY 90 tablet 0     metoprolol succinate (TOPROL-XL) 25 MG TAKE 1 TABLET(25 MG) BY MOUTH DAILY 90 tablet 1     omeprazole (PRILOSEC) 40 MG capsule TK ONE C PO ONCE D  3     ranitidine (ZANTAC) 300 MG tablet Take 1 tablet (300 mg total) by mouth bedtime. 90 tablet 3     No current facility-administered medications on file prior to visit.

## 2021-06-10 NOTE — TELEPHONE ENCOUNTER
Refill Approved    Rx renewed per Medication Renewal Policy. Medication was last renewed on 7/22/19, last OV 1/14/20.    Robina Grullon, [facility] Triage/Med Refill 7/25/2020     Requested Prescriptions   Pending Prescriptions Disp Refills     omeprazole (PRILOSEC) 40 MG capsule [Pharmacy Med Name: OMEPRAZOLE 40MG CAPSULES] 90 capsule 3     Sig: TAKE 1 CAPSULE(40 MG) BY MOUTH DAILY       GI Medications Refill Protocol Passed - 7/22/2020  8:55 PM        Passed - PCP or prescribing provider visit in last 12 or next 3 months.     Last office visit with prescriber/PCP: 1/14/2020 Ros Griffith MD OR same dept: 1/14/2020 Ros Griffith MD OR same specialty: 1/14/2020 Ros Griffith MD  Last physical: Visit date not found Last MTM visit: Visit date not found   Next visit within 3 mo: Visit date not found  Next physical within 3 mo: Visit date not found  Prescriber OR PCP: Shine) SHARON Griffith MD  Last diagnosis associated with med order: 1. GERD without esophagitis  - omeprazole (PRILOSEC) 40 MG capsule [Pharmacy Med Name: OMEPRAZOLE 40MG CAPSULES]; TAKE 1 CAPSULE(40 MG) BY MOUTH DAILY  Dispense: 90 capsule; Refill: 3    If protocol passes may refill for 12 months if within 3 months of last provider visit (or a total of 15 months).

## 2021-06-12 NOTE — TELEPHONE ENCOUNTER
Last Med Check: 8/27/20    Next med check due on: 2/27/20    CSA on File: 8/31/20    Future Appointment Scheduled ? no    Last Med Refill?8/27/20    Chelsi Norton CMA

## 2021-06-12 NOTE — TELEPHONE ENCOUNTER
Controlled Substance Refill Request  Medication Name:   Requested Prescriptions     Pending Prescriptions Disp Refills     ALPRAZolam (XANAX) 0.5 MG tablet 30 tablet 0     Sig: TAKE 1 TABLET BY MOUTH EVERY DAY AS NEEDED FOR SLEEP OR ANXIETY     Date Last Fill: 8/27/20  [facility]ed [facility]: [facility]  Submit electronically to pharmacy  Controlled Substance Agreement on file:   Encounter-Level CSA Scan Date - 08/28/2018:    Scan on 9/5/2018  4:56 PM           Encounter-Level CSA Scan Date - 12/21/2016:    Scan on 12/21/2016           Encounter-Level CSA Scan Date - 02/18/2016:    Scan on 2/18/2016: [facility]        Last office visit:  1/14/20

## 2021-06-13 NOTE — TELEPHONE ENCOUNTER
Controlled Substance Refill Request  Medication Name:   Requested Prescriptions     Pending Prescriptions Disp Refills     ALPRAZolam (XANAX) 0.5 MG tablet [Pharmacy Med Name: ALPRAZOLAM 0.5MG TABLETS] 30 tablet 0     Sig: TAKE 1 TABLET BY MOUTH EVERY DAY AS NEEDED FOR SLEEP OR ANXIETY     Date Last Fill: 10/22/20  [facility]ed [facility]: [facility]  Submit electronically to pharmacy  Controlled Substance Agreement on file:   Encounter-Level CSA Scan Date - 08/28/2018:    Scan on 9/5/2018  4:56 PM           Encounter-Level CSA Scan Date - 12/21/2016:    Scan on 12/21/2016           Encounter-Level CSA Scan Date - 02/18/2016:    Scan on 2/18/2016: [facility]        Last office visit:  8/27/20

## 2021-06-13 NOTE — TELEPHONE ENCOUNTER
Last Med Check: 10/22/20    Next med check due on: 2/27/21    CSA on File: 8/31/20    Future Appointment Scheduled ? no    Last Med Refill? 10/22/20    Chelsi Norton CMA

## 2021-06-14 NOTE — TELEPHONE ENCOUNTER
Refill Approved    Rx renewed per Medication Renewal Policy. Medication was last renewed on 4/23/20.    Ramona Paredes, [facility] Triage/Med Refill 1/20/2021     Requested Prescriptions   Pending Prescriptions Disp Refills     atorvastatin (LIPITOR) 10 MG tablet [Pharmacy Med Name: ATORVASTATIN 10MG TABLETS] 90 tablet 2     Sig: TAKE 1 TABLET(10 MG) BY MOUTH DAILY       Statins Refill Protocol (Hmg CoA Reductase Inhibitors) Passed - 1/20/2021  3:31 AM        Passed - PCP or prescribing provider visit in past 12 months      Last office visit with prescriber/PCP: 8/27/2020 Ros Griffith MD OR same dept: 8/27/2020 Ros Griffith MD OR same specialty: 8/27/2020 Ros Griffith MD  Last physical: Visit date not found Last MTM visit: Visit date not found   Next visit within 3 mo: Visit date not found  Next physical within 3 mo: Visit date not found  Prescriber OR PCP: Shine) SHARON Griffith MD  Last diagnosis associated with med order: 1. Mixed hyperlipidemia  - atorvastatin (LIPITOR) 10 MG tablet [Pharmacy Med Name: ATORVASTATIN 10MG TABLETS]; TAKE 1 TABLET(10 MG) BY MOUTH DAILY  Dispense: 90 tablet; Refill: 2    If protocol passes may refill for 12 months if within 3 months of last provider visit (or a total of 15 months).

## 2021-06-14 NOTE — TELEPHONE ENCOUNTER
Patient calls with concerns regarding pain in his arm. The pain has been present for about 6 weeks. Patient has no known injury.The pain worsened in the last 24 hours and now patient has tingling in 3 fingers in his hand. The pain is constant, even when not moving the arm. Patient currently rates pain at a 6-7. Patient denies any chest pain or difficulty breathing. Patient reports having swelling in his hand a few weeks ago that went away. No fever noted. Patient does have high blood pressure.    Patient is advised that he should go to the ED for evaluation. Patient is not sure he wants to do this or that he can leave work. He is advise that this is the recommendation due to the current symptoms. Patient reports that he will think about it and see what he wants to do.    Nevaeh Boyd RN, BSN Nurse Triage Advisor 9:26 AM 1/25/2021        Reason for Disposition    Age > 40 and no obvious cause for pain, pain still present even when not moving the arm    Additional Information    Negative: Shock suspected (e.g., cold/pale/clammy skin, too weak to stand, low BP, rapid pulse)    Negative: Similar pain previously and it was from 'heart attack'    Negative: Similar pain previously from 'angina' and not relieved by nitroglycerin    Negative: Sounds like a life-threatening emergency to the triager    Negative: Followed an injury to arm    Negative: Chest pain    Negative: Wound looks infected    Negative: Elbow pain is the main symptom    Negative: Hand or wrist pain is the main symptom    Negative: Difficulty breathing or unusual sweating (e.g., sweating without exertion)    Negative: Chest pain lasting longer than 5 minutes    Protocols used: ARM PAIN-A-OH    COVID 19 Nurse Triage Plan/Patient Instructions    Please be aware that novel coronavirus (COVID-19) may be circulating in the community. If you develop symptoms such as fever, cough, or SOB or if you have concerns about the presence of another infection including  coronavirus (COVID-19), please contact your health care provider or visit [email or web address].     Disposition/Instructions    ED Visit recommended. Follow protocol based instructions.      Bring Your Own Device:  Please also bring your smart device(s) (smart phones, tablets, laptops) and their charging cables for your personal use and to communicate with your care team during your visit.      Thank you for taking steps to prevent the spread of this virus.  o Limit your contact with others.  o Wear a simple mask to cover your cough.  o Wash your hands well and often.    Resources    [facility]: About COVID-19: [email or web address]    CDC: What to Do If You're Sick: www.cdc.gov/coronavirus/2019-ncov/about/steps-when-sick.html    CDC: Ending Home Isolation: www.cdc.gov/coronavirus/2019-ncov/hcp/disposition-in-home-patients.html     CDC: Caring for Someone: www.cdc.gov/coronavirus/2019-ncov/if-you-are-sick/care-for-someone.html     [facility]: Interim Guidance for Hospital Discharge to Home: www.health.[email or web address].[place]/diseases/coronavirus/hcp/hospdischarge.pdf    [facility] clinical trials (COVID-19 research studies): [email or web address]-clinical-trials     Below are the COVID-19 hotlines at the [place] Department of Health ([facility]). Interpreters are available.   o For health questions: Call 807-889-4517 or 1-827.803.2182 (7 a.m. to 7 p.m.)  o For questions about schools and childcare: Call 958-330-5845 or 1-376.291.7595 (7 a.m. to 7 p.m.)

## 2021-06-14 NOTE — TELEPHONE ENCOUNTER
Refill Approved    Rx renewed per Medication Renewal Policy. Medication was last renewed on 7/25/20.    Ramona Paredes, [facility] Triage/Med Refill 1/20/2021     Requested Prescriptions   Pending Prescriptions Disp Refills     omeprazole (PRILOSEC) 40 MG capsule [Pharmacy Med Name: OMEPRAZOLE 40MG CAPSULES] 90 capsule 1     Sig: TAKE 1 CAPSULE(40 MG) BY MOUTH DAILY       GI Medications Refill Protocol Passed - 1/19/2021  5:35 PM        Passed - PCP or prescribing provider visit in last 12 or next 3 months.     Last office visit with prescriber/PCP: 8/27/2020 Ros Griffith MD OR same dept: 8/27/2020 Ros Griffith MD OR same specialty: 8/27/2020 Ros Griffith MD  Last physical: Visit date not found Last MTM visit: Visit date not found   Next visit within 3 mo: Visit date not found  Next physical within 3 mo: Visit date not found  Prescriber OR PCP: Shine) SHARON Griffith MD  Last diagnosis associated with med order: 1. GERD without esophagitis  - omeprazole (PRILOSEC) 40 MG capsule [Pharmacy Med Name: OMEPRAZOLE 40MG CAPSULES]; TAKE 1 CAPSULE(40 MG) BY MOUTH DAILY  Dispense: 90 capsule; Refill: 1    If protocol passes may refill for 12 months if within 3 months of last provider visit (or a total of 15 months).

## 2021-06-15 PROBLEM — G47.00 INSOMNIA: Status: ACTIVE | Noted: 2017-05-16

## 2021-06-15 PROBLEM — B07.8 COMMON WART: Status: ACTIVE | Noted: 2017-05-16

## 2021-06-15 PROBLEM — I10 BENIGN HYPERTENSION: Chronic | Status: ACTIVE | Noted: 2017-04-18

## 2021-06-15 NOTE — PROGRESS NOTES
SUBJECTIVE: Elvin Trivedi is a 55 y.o. White or  male who presents today for his med check.  I last saw him in August.  He has a history of hypertension and it is not well controlled today.  He is currently on losartan 100 mg.  His first blood pressure was 159/95.  It did decrease down to 152/90 but still needs to be better controlled.  We discussed what medication to use.  His heart rate is 64 today.  He prefers not to use something that will make him tired.  His mood is poor anyhow.  He has some conflict at work and feels he needs to find a new job.  He is so stressed that he has a lot of pain in his neck and this pain went into his arm on January 25 which sent him to the ER.  He was ruled out for heart issues.  He continues with stress that manifests itself in his shoulders and neck.  He also notes that his left elbow hurts him.  It sounds like he is got lateral epicondylitis when he talks about this pain.  We discussed treating his mood.  He is currently on fluoxetine 20 mg.  He was given a PHQ-9 and scored 8 today which is up from a previous 4 from last summer.  He scores 7 on a SILVIA-7 which is up from 6.  We discussed moving up to the next level of fluoxetine which he would like to do.  He also would like a refill of his Xanax which he uses for sleep and for panic attacks.  We do have a controlled substance agreement for this which was done last summer.  His last fill was December 15 of 2020.  Preventatively, he did have his colonoscopy done in November of last year and he does have colon polyps and needs to repeat that in 3 years.  He also has a history of a splenectomy and is somewhat behind on his immunizations.  It appears he needs vaccination for meningitis both the Men B and the usual Menactra vaccines as well as Hib.  He also is behind on a flu shot but absolutely declines that.  He is willing to do 2 vaccines today and so I suggest doing the Hib and Men B vaccine.    OBJECTIVE: /90    Pulse 64   Wt 160 lb 8 oz (72.8 kg)   SpO2 98%   BMI 25.14 kg/m    General: Normal weight middle-aged gentleman in no acute physical distress  Heart: Regular rate and rhythm without murmur  Lungs: Clear bilaterally  Abdomen: Soft, nontender  Extremities: Tenderness with palpation over the lateral epicondyle of the left arm.  Psych: Mood is low, patient appears anxious when speaking about his work    ASSESSMENT & PLAN:     1. Generalized anxiety disorder  Uncontrolled with a PHQ-9 score of 8 and SILVIA 7 score of 7.  Willing to move up on his fluoxetine.  I gave him a new prescription for 40 mg.  - FLUoxetine (PROZAC) 40 MG capsule; Take 1 capsule (40 mg total) by mouth daily.  Dispense: 90 capsule; Refill: 1    2. Panic attacks  Needs refill of his alprazolam which was last filled 12/15/2020.  We have a controlled substance agreement as of 8/31/2020.  - ALPRAZolam (XANAX) 0.5 MG tablet; Take 1 tablet (0.5 mg total) by mouth as needed for sleep or anxiety.  Dispense: 30 tablet; Refill: 0    3. Insomnia  - ALPRAZolam (XANAX) 0.5 MG tablet; Take 1 tablet (0.5 mg total) by mouth as needed for sleep or anxiety.  Dispense: 30 tablet; Refill: 0    4. Benign hypertension  Not well controlled.  He is at the highest dose of losartan so we need to add another medication.  I will start him on 5 mg of Norvasc and I asked him to come back for a nurse only blood pressure check at his earliest convenience.  I will monitor his labs.  - Basic Metabolic Panel  - amLODIPine (NORVASC) 5 MG tablet; Take 1 tablet (5 mg total) by mouth daily.  Dispense: 90 tablet; Refill: 1    5. Hyperlipidemia, unspecified hyperlipidemia type  Well-controlled at last check with an LDL of 113.    6. History of splenectomy  Needs immunizations.  - HiB PRP-T conjugate vaccine 4 dose IM  - Meningococcal B (PF)    7. Lateral epicondylitis of left elbow  Discussed conservative treatments for this.    I increased his antidepressant and added a new blood pressure  medication.  I will get back to him on his lab results by my chart and only call with grossly abnormal values.  I would like him to come in for a nurse only blood pressure recheck.  I would like him to return to clinic for reevaluation in 2 months time.  Okay for virtual.    40+ minutes spent together with the patient today, more than 50% spent in counseling, discussing the above topics.        Patient Active Problem List   Diagnosis     Generalized anxiety disorder     History of splenectomy     Hyperlipidemia     GERD without esophagitis     Controlled substance agreement signed     Benign hypertension     Insomnia     Common wart     Moderate major depression (H)     Benign neoplasm of rectum     Benign neoplasm of transverse colon     Diverticular disease     Polyp of colon       Current Outpatient Medications on File Prior to Visit   Medication Sig Dispense Refill     atorvastatin (LIPITOR) 10 MG tablet TAKE 1 TABLET(10 MG) BY MOUTH DAILY 90 tablet 2     losartan (COZAAR) 100 MG tablet TAKE 1 TABLET(100 MG) BY MOUTH DAILY 90 tablet 2     methylPREDNISolone (MEDROL DOSEPACK) 4 mg tablet Follow package directions.       omeprazole (PRILOSEC) 40 MG capsule TAKE 1 CAPSULE(40 MG) BY MOUTH DAILY 90 capsule 1     No current facility-administered medications on file prior to visit.

## 2021-06-15 NOTE — TELEPHONE ENCOUNTER
Refill Approved    Rx renewed per Medication Renewal Policy. Medication was last renewed on 6/11/20, last OV 2/9/21.    Robina Grullon, [facility] Triage/Med Refill 3/14/2021     Requested Prescriptions   Pending Prescriptions Disp Refills     losartan (COZAAR) 100 MG tablet [Pharmacy Med Name: LOSARTAN 100MG TABLETS] 90 tablet 2     Sig: TAKE 1 TABLET(100 MG) BY MOUTH DAILY       Angiotensin Receptor Blocker Protocol Passed - 3/14/2021  3:30 AM        Passed - PCP or prescribing provider visit in past 12 months       Last office visit with prescriber/PCP: 2/9/2021 Ros Griffith MD OR same dept: 2/9/2021 Ros Griffith MD OR same specialty: 2/9/2021 Ros Griffith MD  Last physical: Visit date not found Last MTM visit: Visit date not found   Next visit within 3 mo: Visit date not found  Next physical within 3 mo: Visit date not found  Prescriber OR PCP: Shine) SHARON Griffith MD  Last diagnosis associated with med order: 1. Benign hypertension  - losartan (COZAAR) 100 MG tablet [Pharmacy Med Name: LOSARTAN 100MG TABLETS]; TAKE 1 TABLET(100 MG) BY MOUTH DAILY  Dispense: 90 tablet; Refill: 2    If protocol passes may refill for 12 months if within 3 months of last provider visit (or a total of 15 months).             Passed - Serum potassium within the past 12 months     Lab Results   Component Value Date    Potassium 4.0 02/09/2021             Passed - Blood pressure filed in past 12 months     BP Readings from Last 1 Encounters:   03/09/21 128/82             Passed - Serum creatinine within the past 12 months     Creatinine   Date Value Ref Range Status   02/09/2021 0.86 0.70 - 1.30 mg/dL Final

## 2021-06-15 NOTE — PROGRESS NOTES
Assessment:  1.  Disequilibrium, likely due to viral infection, improving.  2.  Ear pain yesterday but significant improvement.  3.  Underlying hypertension.    Plan: Slip written for no work yesterday through at least today, may need to be off tomorrow, but probably can return tomorrow.  He understands and agrees.  Push fluids today.  Get rest today.  Can use Debrox 2-4 drops in the right ear canal at bedtime for the wax that is present.  No sign of otitis externa by exam at this time.  Then he will be following up with Dr. Griffith regarding his hypertension etc.    Subjective: 52-year-old male presenting for evaluation of illness noting that he did have a flu syndrome 2 weeks ago with congestion and cough but that had cleared and he did not miss any work.  Then yesterday he notes at work he thought he had a hunger headache and had a banana and then states that some balance difficulty hit him suddenly.  Notes that he has had equilibrium trouble in the past.  He vomited once yesterday.  He went home from work.  He had pain in the right ear but that pain is better today.  It has a full feeling in it.  No diarrhea.  Past Medical History:   Diagnosis Date     Ruptured appendix 1976     Ruptured spleen      No Known Allergies  Current Outpatient Prescriptions   Medication Sig Dispense Refill     ALPRAZolam (XANAX) 0.5 MG tablet Take 1 tablet (0.5 mg total) by mouth at bedtime as needed for sleep. 60 tablet 0     FLUoxetine (PROZAC) 10 MG tablet TAKE 1 TABLET(10 MG) BY MOUTH DAILY 90 tablet 0     losartan (COZAAR) 50 MG tablet TAKE 1 TABLET(50 MG) BY MOUTH DAILY 90 tablet 2     metoprolol succinate (TOPROL-XL) 25 MG TAKE 1 TABLET(25 MG) BY MOUTH DAILY 90 tablet 1     omeprazole (PRILOSEC) 40 MG capsule TK ONE C PO ONCE D  3     aspirin 81 MG EC tablet Take 81 mg by mouth daily.       No current facility-administered medications for this visit.      Objective:/90  Pulse 70  Temp 98.1  F (36.7  C) (Oral)   Resp  "12  Ht 5' 7.5\" (1.715 m)  Wt 162 lb (73.5 kg)  SpO2 96%  BMI 25 kg/m2  When I entered the room, he was lying on the exam table because he did not feel well but then he was able to sit up and felt a little better.  Head number cephalic.  External ears and TMs look normal.  Some wax in the right ear canal but not occluding the TM.  Minimal nasal congestion.  Oropharynx negative.  Neck supple without adenopathy or thyromegaly.  No tenderness to palpation of the right pinna.  Lungs clear.  Heart regular rate and rhythm without murmur.  Abdomen shows no masses tenderness or hepatosplenomegaly.  "

## 2021-06-15 NOTE — PROGRESS NOTES
I met with Elvin Trivedi at the request of Dr Griffith  to recheck his blood pressure.  Blood pressure medications on the MAR were reviewed with patient.    Patient has taken all medications as per usual regimen: Yes started new medication   Patient reports tolerating them without any issues or concerns: Yes    Vitals:    03/09/21 0720   BP: 128/82   Pulse: 65   SpO2: 97%       Blood pressure was taken, previous encounter was reviewed, recorded blood pressure below 140/90.  Patient was discharged and the note will be sent to the provider for final review.

## 2021-06-15 NOTE — TELEPHONE ENCOUNTER
RN cannot approve Refill Request    RN can NOT refill this medication med is not covered by policy/route to provider. Last office visit: 2/9/2021 Ros Griffith MD Last Physical: Visit date not found Last MTM visit: Visit date not found Last visit same specialty: 2/9/2021 Ros Griffith MD.  Next visit within 3 mo: Visit date not found  Next physical within 3 mo: Visit date not found      Mary Fox, [facility] Triage/Med Refill 2/19/2021    Requested Prescriptions   Pending Prescriptions Disp Refills     ibuprofen (ADVIL,MOTRIN) 400 MG tablet 360 tablet 0     Sig: Take 1 tablet (400 mg total) by mouth every 6 (six) hours as needed for pain.       There is no refill protocol information for this order           
Home

## 2021-06-16 PROBLEM — F32.1 MODERATE MAJOR DEPRESSION (H): Chronic | Status: ACTIVE | Noted: 2018-09-03

## 2021-06-16 PROBLEM — D12.3 BENIGN NEOPLASM OF TRANSVERSE COLON: Status: ACTIVE | Noted: 2020-11-10

## 2021-06-16 PROBLEM — K63.5 POLYP OF COLON: Status: ACTIVE | Noted: 2020-11-06

## 2021-06-16 PROBLEM — D12.8 BENIGN NEOPLASM OF RECTUM: Status: ACTIVE | Noted: 2020-11-10

## 2021-06-16 PROBLEM — K57.90 DIVERTICULAR DISEASE: Status: ACTIVE | Noted: 2020-11-06

## 2021-06-16 NOTE — TELEPHONE ENCOUNTER
Telephone Encounter by Zaida Sweeney at 1/30/2019  8:09 AM     Author: Zaida Sweeney Service: -- Author Type: --    Filed: 1/30/2019  8:11 AM Encounter Date: 1/22/2019 Status: Signed    : Zaida Sweeney       PRIOR AUTHORIZATION DENIED    Denial Rational: PATIENT NEEDS TO TRY/FAIL PANTOPRAZOLE        Appeal Information:

## 2021-06-16 NOTE — TELEPHONE ENCOUNTER
Telephone Encounter by Zaida Sweeney at 1/24/2019  1:35 PM     Author: Zaida Sweeney Service: -- Author Type: --    Filed: 1/24/2019  1:36 PM Encounter Date: 1/22/2019 Status: Signed    : Zaida Sweeney       PA INITIATION

## 2021-06-17 NOTE — PROGRESS NOTES
SUBJECTIVE: Elvin Trivedi is a 55 y.o. White or  male who presents today for follow-up of a 2/9 appointment with a complaint of increasing anxiety and depression.  He was on 20 of fluoxetine at the time.  I doubled his dose to 40.  He said he is tolerating this medication.  He was given a PHQ-9 and scored 14 today.  He scored 10 on the SILVIA-7.  Both of these numbers are worse than last time.  However, he seems more calm now than when I last saw him.  He says he is dealing with the situation at work.  He says what is bothering him is his left arm that he has been seen for prior and which he thinks they said was lateral epicondylitis.  They sent him to physical therapy which he partially completed due to them canceling him and then he canceled the last visit.  He has been doing his exercises.  He sleeps on his side and this is what makes it worse.  He will wake with it being very sore and aches up and aches down from the elbow.  He is taking lots of Advil and Tylenol.  He says he was given a Medrol Dosepak that helped for a while.  Last night he flared it a bit again because he was doing yard work.  We discussed getting another Medrol Dosepak.  We also added Norvasc 5 mg to his hypertension regimen because his blood pressure was 152/90.  Today his blood pressure is beautiful.  He is tolerating the medication is willing to continue.  He also basically has a splenectomy and so is in need of immunization.  He did his men B first dose but we have no men B left in clinic today.  He still needs a Menactra done.    OBJECTIVE: /74   Pulse 64   Wt 153 lb (69.4 kg)   SpO2 97%   BMI 23.96 kg/m    General: Well-appearing middle-aged normal weight male in no acute distress  Heart: Regular rate and rhythm without murmur  Lungs: Clear bilaterally  Abdomen: Soft  Extremities: Warm, dry and without edema  Psych: Appears less anxious than when I saw him last, somewhat flat affect    ASSESSMENT & PLAN:     1. Benign  hypertension  Now well controlled with the addition of amlodipine 5 mg to his previous losartan 100 mg.  Will give him a refill.  - amLODIPine (NORVASC) 5 MG tablet; Take 1 tablet (5 mg total) by mouth daily.  Dispense: 90 tablet; Refill: 1    2. Generalized anxiety disorder  SILVIA-7 score is 10 which still seems uncontrolled.  Will refill his fluoxetine.  Needing to use alprazolam at times.  Trying to minimize as much as possible.  - FLUoxetine (PROZAC) 40 MG capsule; Take 1 capsule (40 mg total) by mouth daily.  Dispense: 90 capsule; Refill: 1    3. Moderate major depression (H)  PHQ-9 score is 14 which is worse than his previous when I increased his medication.    4. History of splenectomy  Due for his  meningococcal vaccines.  Was going to do a Menactra today as the meningococcal B was not available in clinic.  Unfortunately patient left before giving the vaccine.  - Meningococcal MCV4P    5. Panic attacks  Wants refill.  Last refill was 2/9.  He may need to have a controlled substance agreement, but I prefer he get his anxiety under control.  - ALPRAZolam (XANAX) 0.5 MG tablet; Take 1 tablet (0.5 mg total) by mouth as needed for sleep or anxiety.  Dispense: 30 tablet; Refill: 0    6. Insomnia  Using for sleep if needed.  - ALPRAZolam (XANAX) 0.5 MG tablet; Take 1 tablet (0.5 mg total) by mouth as needed for sleep or anxiety.  Dispense: 30 tablet; Refill: 0    7. Left lateral epicondylitis  Flare with doing yard work this weekend.  Will treat.  May need to refer him in the future.  - methylPREDNISolone (MEDROL DOSEPACK) 4 mg tablet; Follow package directions.  Dispense: 21 tablet; Refill: 0    He is due for his med check and labs in August.    Patient Active Problem List   Diagnosis     Generalized anxiety disorder     History of splenectomy     Hyperlipidemia     GERD without esophagitis     Controlled substance agreement signed     Benign hypertension     Insomnia     Common wart     Moderate major depression  (H)     Benign neoplasm of rectum     Benign neoplasm of transverse colon     Diverticular disease     Polyp of colon       Current Outpatient Medications on File Prior to Visit   Medication Sig Dispense Refill     atorvastatin (LIPITOR) 10 MG tablet TAKE 1 TABLET(10 MG) BY MOUTH DAILY 90 tablet 2     ibuprofen (ADVIL,MOTRIN) 400 MG tablet Take 1 tablet (400 mg total) by mouth every 6 (six) hours as needed for pain. 360 tablet 0     losartan (COZAAR) 100 MG tablet Take 1 tablet (100 mg total) by mouth daily. 90 tablet 3     omeprazole (PRILOSEC) 40 MG capsule TAKE 1 CAPSULE(40 MG) BY MOUTH DAILY 90 capsule 1     [DISCONTINUED] ALPRAZolam (XANAX) 0.5 MG tablet Take 1 tablet (0.5 mg total) by mouth as needed for sleep or anxiety. 30 tablet 0     [DISCONTINUED] amLODIPine (NORVASC) 5 MG tablet Take 1 tablet (5 mg total) by mouth daily. 90 tablet 1     [DISCONTINUED] FLUoxetine (PROZAC) 40 MG capsule Take 1 capsule (40 mg total) by mouth daily. 90 capsule 1     [DISCONTINUED] methylPREDNISolone (MEDROL DOSEPACK) 4 mg tablet Follow package directions.       No current facility-administered medications on file prior to visit.

## 2021-06-18 NOTE — PROGRESS NOTES
SUBJECTIVE: Elvin Trviedi is a 52 y.o. White or  male who presents today for a med check and to follow-up on an ER visit of 18.  He went into the ER with some pretty excruciating back pain.  He has had this for the last few weeks, ever since his mom .  He was being more active and he felt a jarring in his lumbar spine.  At times he was having shooting pain down his right leg.  They treated him with muscle relaxer and ibuprofen only.  We discussed trying a Medrol Dosepak which he is open to.  When he was seen his blood pressure was very elevated at 171/84 at the time.  Some of it due to pain but also due to the fact that he no longer was taking his metoprolol.  He is also on 50 mg of losartan.  We discussed that he could either increase his losartan or add back the metoprolol.  He opted to increase the losartan.  He has had a lot of issues with anxiety in the last couple years.  He is mom was dealing with Alzheimer's and then she  and he had been just starting a new job and that was stressful.  He actually likes his job pretty much although there was an incident yesterday that was not so great apparently.  He does have trouble with insomnia and has had panic although that has improved recently.  He is on fluoxetine low dose at 10 mg we discussed that this is pretty low and he had a good response when he started it but he probably needs a boost on it because his SILVIA 7 score today is 14 and his PHQ 9 score is 6.  With all the stress he started smoking again and he is not happy with himself.  Unfortunately his wife smokes as well.  She smokes more than he does.  He is starting to get motivated to really quit.  He does not want Chantix because that gave him weird dreams.  I offered Wellbutrin but he prefers to do it on his own.  He has had a history of reflux and has omeprazole daily available.  We discussed that if he is going to take Aleve or ibuprofen that he needs to maybe make sure to  take this daily.  He is due for his colonoscopy and we discuss his options on this since he has no family history of polyp or cancer as far as he knows.  He prefers to do the colonoscopy first instead of the colo he however does not want to do it until next fall guard..    OBJECTIVE: /90 (Patient Site: Right Arm, Patient Position: Sitting, Cuff Size: Adult Regular)  Pulse 74  Wt 162 lb 11.2 oz (73.8 kg)  SpO2 98%  BMI 25.11 kg/m2  General: Normal weight middle-aged gentleman in no acute distress  Heart: Regular rate and rhythm without murmur  Lungs: Clear bilaterally  Abdomen: Soft  Extremities: Warm, dry and without edema  Psych: Mood is anxious, slightly depressed perhaps    ASSESSMENT & PLAN:    1. Generalized anxiety disorder  FLUoxetine (PROZAC) 20 MG capsule   2. Benign hypertension  losartan (COZAAR) 100 MG tablet    Comprehensive Metabolic Panel   3. Hyperlipidemia, unspecified hyperlipidemia type  Lipid Cascade FASTING   4. Lumbar pain  methylPREDNISolone (MEDROL DOSEPACK) 4 mg tablet    Vitamin D, Total (25-Hydroxy)    sciatica   5. Insomnia  ALPRAZolam (XANAX) 0.5 MG tablet   6. Panic attacks  ALPRAZolam (XANAX) 0.5 MG tablet   7. GERD without esophagitis  omeprazole (PRILOSEC) 40 MG capsule   8. Screen for colon cancer  Ambulatory referral for Colonoscopy     I am increasing his fluoxetine from 10 mg to 20.  I am increasing his losartan from 50 mg 200 mg.  I am starting him on Medrol Dosepak for his sciatica.  I am refilling his alprazolam for his panic and insomnia.  He is to continue with his over-the-counter medication as well and use this sparingly.  I refilled his omeprazole.  I will recheck the above-mentioned lab work and get back to him either by mail or my chart and at that point we will decide when he has follow-up.  Referral for colonoscopy was given with request to call in fall to schedule.  He will follow-up accordingly.40 minutes spent together with the patient today, more than  50% spent in counseling, discussing the above topics.        Patient Active Problem List   Diagnosis     Generalized anxiety disorder     History of splenectomy     Hyperlipidemia     GERD without esophagitis     Controlled substance agreement signed     Benign hypertension     Insomnia     Common wart       Current Outpatient Prescriptions on File Prior to Visit   Medication Sig Dispense Refill     ibuprofen (ADVIL,MOTRIN) 800 MG tablet Take 1 tablet (800 mg total) by mouth 3 (three) times a day. 21 tablet 0     [DISCONTINUED] FLUoxetine (PROZAC) 10 MG capsule Take 1 capsule (10 mg total) by mouth daily. 90 capsule 0     [DISCONTINUED] losartan (COZAAR) 50 MG tablet TAKE 1 TABLET(50 MG) BY MOUTH DAILY 90 tablet 2     [DISCONTINUED] omeprazole (PRILOSEC) 40 MG capsule TAKE ONE CAPSULE BY MOUTH ONCE DAILY 90 capsule 0     aspirin 81 MG EC tablet Take 81 mg by mouth daily.       [DISCONTINUED] ALPRAZolam (XANAX) 0.5 MG tablet Take 1 tablet (0.5 mg total) by mouth at bedtime as needed for sleep. 60 tablet 0     [DISCONTINUED] cyclobenzaprine (FLEXERIL) 10 MG tablet Take 1 tablet (10 mg total) by mouth 2 (two) times a day as needed for muscle spasms. 20 tablet 0     [DISCONTINUED] FLUoxetine (PROZAC) 10 MG tablet TAKE 1 TABLET(10 MG) BY MOUTH DAILY 90 tablet 0     [DISCONTINUED] metoprolol succinate (TOPROL-XL) 25 MG TAKE 1 TABLET(25 MG) BY MOUTH DAILY 90 tablet 1     [DISCONTINUED] omeprazole (PRILOSEC) 40 MG capsule TK ONE C PO ONCE D  3     No current facility-administered medications on file prior to visit.

## 2021-06-19 NOTE — PROGRESS NOTES
I met with Elvin Trivedi at the request of Dr. Griffith to recheck his blood pressure.  Blood pressure medications on the MAR were reviewed with patient.    Patient has taken all medications as per usual regimen: Yes  Patient reports tolerating them without any issues or concerns: Yes    Vitals:    07/24/18 1307   BP: 138/76       Blood pressure was taken, previous encounter was reviewed, patient was instructed to continue as instructed until f/u appt..

## 2021-06-19 NOTE — LETTER
Letter by Ros Griffith MD at      Author: Ros Griffith MD Service: -- Author Type: --    Filed:  Encounter Date: 5/2/2019 Status: (Other)         Elvin Trivedi  [id number] [address]             May 2, 2019         Dear Mr. Trivedi,    Below are the results from your recent visit:    Resulted Orders   Vitamin D, Total (25-Hydroxy)   Result Value Ref Range    Vitamin D, Total (25-Hydroxy) 58.0 30.0 - 80.0 ng/mL    Narrative    Deficiency <10.0 ng/mL  Insufficiency 10.0-29.9 ng/mL  Sufficiency 30.0-80.0 ng/mL  Toxicity (possible) >100.0 ng/mL   Lipid Cascade   Result Value Ref Range    Cholesterol 189 <=199 mg/dL    Triglycerides 180 (H) <=149 mg/dL    HDL Cholesterol 50 >=40 mg/dL    LDL Calculated 103 <=129 mg/dL    Patient Fasting > 8hrs? Yes    Comprehensive Metabolic Panel   Result Value Ref Range    Sodium 141 136 - 145 mmol/L    Potassium 4.4 3.5 - 5.0 mmol/L    Chloride 107 98 - 107 mmol/L    CO2 25 22 - 31 mmol/L    Anion Gap, Calculation 9 5 - 18 mmol/L    Glucose 114 70 - 125 mg/dL    BUN 8 8 - 22 mg/dL    Creatinine 0.90 0.70 - 1.30 mg/dL    GFR MDRD Af Amer >60 >60 mL/min/1.73m2    GFR MDRD Non Af Amer >60 >60 mL/min/1.73m2    Bilirubin, Total 0.3 0.0 - 1.0 mg/dL    Calcium 9.5 8.5 - 10.5 mg/dL    Protein, Total 6.4 6.0 - 8.0 g/dL    Albumin 3.7 3.5 - 5.0 g/dL    Alkaline Phosphatase 57 45 - 120 U/L    AST 16 0 - 40 U/L    ALT 34 0 - 45 U/L    Narrative    Fasting Glucose reference range is 70-99 mg/dL per  American Diabetes Association (ADA) guidelines.       Overall these are very good lab results.  The 10-year ASCVD risk score (Ritchie OLIVEIRA Jr., et al., 2013) is: 5.1%    Values used to calculate the score:      Age: 53 years      Sex: Male      Is Non- : No      Diabetic: No      Tobacco smoker: No      Systolic Blood Pressure: 136 mmHg      Is BP treated: Yes      HDL Cholesterol: 50 mg/dL      Total Cholesterol: 189 mg/dL  The only thing that  would improve your risk for heart attack or stroke in the next 10 years is a lower blood pressure and cholesterol.  You might consider treating your cholesterol more aggressively.  Either way, I would like to see you back in no longer than 6 months time.    Please call with questions or contact us using [facility].    Sincerely,        Electronically signed by Ros Griffith MD (Betsy)

## 2021-06-19 NOTE — LETTER
Letter by Ros Griffith MD at      Author: Ros Griffith MD Service: -- Author Type: --    Filed:  Encounter Date: 4/30/2019 Status: (Other)         [facility]ICINE/OB  04/30/19    Patient: Elvin Trivedi  YOB: 1965  Medical Record Number: [id number]  CSN: [id number]                                                                              Non-opioid Controlled Substance Agreement    I understand that my care provider has prescribed a controlled substance to help manage my condition(s). I am taking this medicine to help me function or work. I know this is strong medicine, and that it can cause serious side effects. Controlled substances can be sedating, addicting and may cause a dependency on the drug. They can affect my ability to drive or think, and cause depression. They need to be taken exactly as prescribed. Combining controlled substances with certain medicines or chemicals (such as cocaine, sedatives and tranquilizers, sleeping pills, meth) can be dangerous or even fatal. Also, if I stop controlled substances suddenly, I may have severe withdrawal symptoms.  If not helpful, I may be asked to stop them.    The risks, benefits, and side effects of these medicine(s) were explained to me. I agree that:    1. I will take part in other treatments as advised by my care team. This may be psychiatry or counseling, physical therapy, behavioral therapy, group treatment or a referral to a pain clinic. I will reduce or stop my medicine when my care team tells me to do so.  2. I will take my medicines as prescribed. I will not change the dose or schedule unless my care team tells me to. There will be no refills if I run out early.  I may be contactedwithout warning and asked to complete a urine drug test or pill count at any time.   3. I will keep all my appointments, and understand this is part of the monitoring of controlled substances. My care team may require an  office visit for EVERY controlled substance refill. If I miss appointments or dont follow instructions, my care team may stop my medicine.  4. I will not ask other providers to prescribe controlled substances, and I will not accept controlled substances from other people. If I need another prescribed controlled substance for a new reason, I will tell my care team within 1 business day.  5. I will use one pharmacy to fill all of my controlled substance prescriptions, and it is up to me to make sure that I do not run out of my medicines on weekends or holidays. If my care team is willing to refill my controlled substance prescription without a visit, I must request refills only during office hours, refills may take up to 3 days to process, and it may take up to 5 to 7 days for my medicine to be mailed and ready at my pharmacy. Prescriptions will not be mailed anywhere except my pharmacy.    6. I am responsible for my prescriptions. If the medicine/prescription is lost or stolen, it will not be replaced. I also agree not to share controlled substance medicines with anyone.          [facility] FAMILY MEDICINE/OB  04/30/19  Patient:  Elvin Trivedi  YOB: 1965  Medical Record Number: [id number]  CSN: [id number]    7. I agree to not use ANY illegal or recreational drugs. This includes marijuana, cocaine, bath salts or other drugs. I agree not to use alcohol unless my care team says I may. I agree to give urine samples whenever asked. If I dont give a urine sample, the care team may stop my medicine.    8. If I enroll in the [place] Medical Marijuana program, I will tell my care team. I will also sign an agreement to share my medical records with my care team.    9. I will bring in my list of medicines (or my medicine bottles) each time I come to the clinic.   10. I will tell my care team right away if I become pregnant or have a new medical problem treated outside of my regular clinic.  11. I  understand that this medicine can affect my thinking and judgment. It may be unsafe for me to drive, use machinery and do dangerous tasks. I will not do any of these things until I know how the medicine affects me. If my dose changes, I will wait to see how it affects me. I will contact my care team if I have concerns about medicine side effects.    I understand that if I do not follow any of the conditions above, my prescriptions or treatment may be stopped.      I agree that my provider, clinic care team, and pharmacy may work with any city, state or federal law enforcement agency that investigates the misuse, sale, or other diversion of my controlled medicine. I will allow my provider to discuss my care with or share a copy of this agreement with any other treating provider, pharmacy or emergency room where I receive care. I agree to give up (waive) any right of privacy or confidentiality with respect to these consents.   I have read this agreement and have asked questions about anything I did not understand.    ___________________________________________________________________________  Patient signature - Date/Time  -Elvin DEBBY Trivedi                                      ___________________________________________________________________________  Witness signature                                                                    ___________________________________________________________________________  Provider signatureBillie Griffith MD (Betsy)

## 2021-06-20 NOTE — LETTER
Letter by Ros Griffith MD at      Author: Ros Griffith MD Service: -- Author Type: --    Filed:  Encounter Date: 8/27/2020 Status: (Other)         [facility] FAMILY MEDICINE/OB  08/27/20    Patient: Elvin Trivedi  YOB: 1965  Medical Record Number: [id number]  CSN: [id number]                                                                              Non-opioid Controlled Substance Agreement    I understand that my care provider has prescribed a controlled substance to help manage my condition(s). I am taking this medicine to help me function or work. I know this is strong medicine, and that it can cause serious side effects. Controlled substances can be sedating, addicting and may cause a dependency on the drug. They can affect my ability to drive or think, and cause depression. They need to be taken exactly as prescribed. Combining controlled substances with certain medicines or chemicals (such as cocaine, sedatives and tranquilizers, sleeping pills, meth) can be dangerous or even fatal. Also, if I stop controlled substances suddenly, I may have severe withdrawal symptoms.  If not helpful, I may be asked to stop them.    The risks, benefits, and side effects of these medicine(s) were explained to me. I agree that:    1. I will take part in other treatments as advised by my care team. This may be psychiatry or counseling, physical therapy, behavioral therapy, group treatment or a referral to a pain clinic. I will reduce or stop my medicine when my care team tells me to do so.  2. I will take my medicines as prescribed. I will not change the dose or schedule unless my care team tells me to. There will be no refills if I run out early.  I may be contactedwithout warning and asked to complete a urine drug test or pill count at any time.   3. I will keep all my appointments, and understand this is part of the monitoring of controlled substances. My care team may require an  office visit for EVERY controlled substance refill. If I miss appointments or dont follow instructions, my care team may stop my medicine.  4. I will not ask other providers to prescribe controlled substances, and I will not accept controlled substances from other people. If I need another prescribed controlled substance for a new reason, I will tell my care team within 1 business day.  5. I will use one pharmacy to fill all of my controlled substance prescriptions, and it is up to me to make sure that I do not run out of my medicines on weekends or holidays. If my care team is willing to refill my controlled substance prescription without a visit, I must request refills only during office hours, refills may take up to 3 days to process, and it may take up to 5 to 7 days for my medicine to be mailed and ready at my pharmacy. Prescriptions will not be mailed anywhere except my pharmacy.    6. I am responsible for my prescriptions. If the medicine/prescription is lost or stolen, it will not be replaced. I also agree not to share controlled substance medicines with anyone.          [facility] FAMILY MEDICINE/OB  08/27/20  Patient:  Elvin Trivedi  YOB: 1965  Medical Record Number: [id number]  CSN: [id number]    7. I agree to not use ANY illegal or recreational drugs. This includes marijuana, cocaine, bath salts or other drugs. I agree not to use alcohol unless my care team says I may. I agree to give urine samples whenever asked. If I dont give a urine sample, the care team may stop my medicine.    8. If I enroll in the [place] Medical Marijuana program, I will tell my care team. I will also sign an agreement to share my medical records with my care team.    9. I will bring in my list of medicines (or my medicine bottles) each time I come to the clinic.   10. I will tell my care team right away if I become pregnant or have a new medical problem treated outside of my regular clinic.  11. I  understand that this medicine can affect my thinking and judgment. It may be unsafe for me to drive, use machinery and do dangerous tasks. I will not do any of these things until I know how the medicine affects me. If my dose changes, I will wait to see how it affects me. I will contact my care team if I have concerns about medicine side effects.    I understand that if I do not follow any of the conditions above, my prescriptions or treatment may be stopped.      I agree that my provider, clinic care team, and pharmacy may work with any city, state or federal law enforcement agency that investigates the misuse, sale, or other diversion of my controlled medicine. I will allow my provider to discuss my care with or share a copy of this agreement with any other treating provider, pharmacy or emergency room where I receive care. I agree to give up (waive) any right of privacy or confidentiality with respect to these consents.   I have read this agreement and have asked questions about anything I did not understand.    ___________________________________________________________________________  Patient signature - Date/Time  -Elvin DEBBY Trivedi                                      ___________________________________________________________________________  Witness signature                                                                    ___________________________________________________________________________  Provider signatureBillie Griffith MD (Betsy)

## 2021-06-20 NOTE — PROGRESS NOTES
SUBJECTIVE: Elvin Trivedi is a 52 y.o. White or  male who presents today with a 2 month follow-up.  He has generalized anxiety disorder with panic attack as well as some recurrent depression.  When I saw him last we increased his fluoxetine from 10-20 mg.  He was given a PHQ 9 today and scored 11.  He scored 11 on his SILVIA 7 as well.  His anxiety has improved but his depression has worsened but he knows that that is situational currently.  He tells me that he has not smoked in 1 month now and I congratulated him.  When I saw him last 2 hypertension was uncontrolled and so his losartan was increased from 50 to 100 mg.  Today his blood pressure is excellent.  He is happy to see this.  He has hyperlipidemia and his LDL was 191 which put him at a 19% risk of heart attack or stroke in the next 10 years which was too high for his age and so we started Lipitor 10 mg.  He tells me he is tolerating this medication without problems.  He also was vitamin D deficient with a value of 19.2.  He is now supplementing and we discussed rechecking this lab.  He tells me he is exercising more with walking a lot as he is got a new dog.  He needs a refill of his alprazolam for which we have filled out a CSA for.  He is willing to have his flu shot and a pneumococcal 23.    OBJECTIVE: /80 (Patient Site: Right Arm, Patient Position: Sitting, Cuff Size: Adult Regular)  Pulse (!) 58  Temp 97.9  F (36.6  C) (Oral)   Wt 157 lb 12.8 oz (71.6 kg)  SpO2 96%  BMI 24.35 kg/m2  General: Normal weight middle-aged gentleman in no acute distress  Heart: Regular rate and rhythm without murmur  Lungs: Clear bilaterally  Abdomen: Soft, nontender  Extremities: Warm, dry and without edema  Psych: A bit fidgety, anxious, flat affect    ASSESSMENT & PLAN:    1. Generalized anxiety disorder     2. Panic attacks  ALPRAZolam (XANAX) 0.5 MG tablet   3. Moderate major depression (H)     4. Insomnia  ALPRAZolam (XANAX) 0.5 MG tablet   5.  Controlled substance agreement signed     6. Benign hypertension  Basic Metabolic Panel   7. Hyperlipidemia, unspecified hyperlipidemia type  Hepatic Profile    Lipid Cascade   8. Vitamin D deficiency  Vitamin D, Total (25-Hydroxy)   9. Immunization due  Influenza, Seasonal,Quad Inj, 36+ MOS    Pneumococcal polysaccharide vaccine 23-valent 1 yo or older, subq/IM     I will check the above-mentioned labs and I will get back to him by mail and only call with grossly abnormal values.  We did a controlled substance agreement for Xanax 0.5 mg for extreme anxiety and insomnia 3 days maximum.  I refilled his alprazolam.  He was given his pneumococcal 23 vaccine and flu shot as well today.  I congratulated and told him to keep up the good work with not smoking.  He should see me back in about 4-6 months time.    Patient Active Problem List   Diagnosis     Generalized anxiety disorder     History of splenectomy     Hyperlipidemia     GERD without esophagitis     Controlled substance agreement signed     Benign hypertension     Insomnia     Common wart     Moderate major depression (H)       Current Outpatient Prescriptions on File Prior to Visit   Medication Sig Dispense Refill     aspirin 81 MG EC tablet Take 81 mg by mouth daily.       atorvastatin (LIPITOR) 10 MG tablet TAKE 1 TABLET(10 MG) BY MOUTH DAILY 90 tablet 0     FLUoxetine (PROZAC) 20 MG capsule Take 1 capsule (20 mg total) by mouth daily. 90 capsule 1     losartan (COZAAR) 100 MG tablet Take 1 tablet (100 mg total) by mouth daily. 90 tablet 1     omeprazole (PRILOSEC) 40 MG capsule Take 1 capsule (40 mg total) by mouth daily. 90 capsule 1     No current facility-administered medications on file prior to visit.

## 2021-06-20 NOTE — LETTER
Letter by Ros Griffith MD at      Author: Ros Griffith MD Service: -- Author Type: --    Filed:  Encounter Date: 1/14/2020 Status: Signed         Elvin Trivedi  [address] [facility] [address]       January 14, 2020     Dear Mr. Trivedi,  Below are the results from your recent visit:    Resulted Orders   Basic Metabolic Panel   Result Value Ref Range    Sodium 140 136 - 145 mmol/L    Potassium 4.4 3.5 - 5.0 mmol/L    Chloride 108 (H) 98 - 107 mmol/L    CO2 24 22 - 31 mmol/L    Anion Gap, Calculation 8 5 - 18 mmol/L    Glucose 114 70 - 125 mg/dL    Calcium 9.2 8.5 - 10.5 mg/dL    BUN 10 8 - 22 mg/dL    Creatinine 0.84 0.70 - 1.30 mg/dL    GFR MDRD Af Amer >60 >60 mL/min/1.73m2    GFR MDRD Non Af Amer >60 >60 mL/min/1.73m2    Narrative    Fasting Glucose reference range is 70-99 mg/dL per  American Diabetes Association (ADA) guidelines.   Lipid Cascade   Result Value Ref Range    Cholesterol 173 <=199 mg/dL    Triglycerides 108 <=149 mg/dL    HDL Cholesterol 45 >=40 mg/dL    LDL Calculated 106 <=129 mg/dL    Patient Fasting > 8hrs? Unknown        These are good lab results.  See you in 6 months.    Please call with questions or contact us using [employer].    Sincerely,        Electronically signed by Ros Griffith MD (Betsy)

## 2021-06-23 NOTE — TELEPHONE ENCOUNTER
Prior Authorization Request  Who s requesting:  Pharmacy  Pharmacy Name and Location: [facility]   Medication Name: Esomeprazole magnesium 40 mg   Insurance Plan:[employer]   Insurance Member ID Number:  [id number]  Informed patient that prior authorizations can take up to 10 business days for response:   No  Okay to leave a detailed message: Yes

## 2021-06-23 NOTE — TELEPHONE ENCOUNTER
I sent a prescription for 90 days of the Nexium 40 mg.  I will see him at our next visit in February.  Please get him scheduled if he is not already scheduled for his 6-month check.

## 2021-06-23 NOTE — TELEPHONE ENCOUNTER
Medication Question or Clarification  Who is calling: Patient  What medication are you calling about?: omeprazole (PRILOSEC) 40 MG capsule  What dose do you take?: 40 mg  How often are you taking the medication?: Daily  Who prescribed the medication?: Ros Griffith MD  What is your question/concern?: Patient reports this medication is no longer working for his heartburn. He just picked up a 90 day supply about a week ago but he is having increased heartburn.  Patient reports he has limited his soda intake, limited citrus foods and acidic foods. He is not sure why it got so bad recently.    He did get OTC Nexium and is wondering if Ros Griffith MD can change RX to this.   Patient reports it is getting him better relief.    Please advise.  Pharmacy: [facility] #[id number]  Okay to leave a detailed message?: No  Site CMT - Please call the pharmacy to obtain any additional needed information.

## 2021-06-23 NOTE — TELEPHONE ENCOUNTER
Refill Approved    Rx renewed per Medication Renewal Policy. Medication was last renewed on 8.28.18.    Marj Loo, [facility] Triage/Med Refill 1/11/2019     Requested Prescriptions   Pending Prescriptions Disp Refills     omeprazole (PRILOSEC) 40 MG capsule [Pharmacy Med Name: OMEPRAZOLE 40MG CAPSULES] 90 capsule 0     Sig: TAKE 1 CAPSULE(40 MG) BY MOUTH DAILY    GI Medications Refill Protocol Passed - 1/10/2019  8:20 PM       Passed - PCP or prescribing provider visit in last 12 or next 3 months.    Last office visit with prescriber/PCP: 8/28/2018 Ros Griffith MD OR same dept: 8/28/2018 Ros Griffith MD OR same specialty: 8/28/2018 Ros Griffith MD  Last physical: Visit date not found Last MTM visit: Visit date not found   Next visit within 3 mo: Visit date not found  Next physical within 3 mo: Visit date not found  Prescriber OR PCP: Shine) SHARON Griffith MD  Last diagnosis associated with med order: 1. GERD without esophagitis  - omeprazole (PRILOSEC) 40 MG capsule [Pharmacy Med Name: OMEPRAZOLE 40MG CAPSULES]; TAKE 1 CAPSULE(40 MG) BY MOUTH DAILY  Dispense: 90 capsule; Refill: 0    If protocol passes may refill for 12 months if within 3 months of last provider visit (or a total of 15 months).

## 2021-06-23 NOTE — TELEPHONE ENCOUNTER
Please call patient and let them know that I have sent a prescription for pantoprazole for him to try due to the denial by the insurance.

## 2021-07-03 NOTE — ADDENDUM NOTE
Addendum Note by Ros Griffith MD at 6/28/2018  2:26 PM     Author: Ros Griffith MD Service: -- Author Type: Physician    Filed: 6/28/2018  2:26 PM Encounter Date: 6/28/2018 Status: Signed    : Ros Griffith MD (Physician)    Addended by: ORS GRIFFITH on: 6/28/2018 02:26 PM        Modules accepted: Orders

## 2021-07-20 DIAGNOSIS — K21.9 GERD WITHOUT ESOPHAGITIS: Primary | ICD-10-CM

## 2021-07-20 DIAGNOSIS — F41.1 GENERALIZED ANXIETY DISORDER: ICD-10-CM

## 2021-07-20 DIAGNOSIS — F51.01 PRIMARY INSOMNIA: ICD-10-CM

## 2021-07-23 ENCOUNTER — NURSE TRIAGE (OUTPATIENT)
Dept: NURSING | Facility: CLINIC | Age: 56
End: 2021-07-23

## 2021-07-23 NOTE — TELEPHONE ENCOUNTER
RN triage   Call from pt   Pt states he has been requesting refills since Monday from pharmacy - [facility]   Refills :  Omeprazole   Xanax     Please advise   Shiela Anderson RN  [facility]  Triage Nurse Advisor        Reason for Disposition    Caller requesting a NON-URGENT new prescription or refill and triager unable to refill per department policy    Protocols used: MEDICATION QUESTION CALL-A-[place]

## 2021-07-23 NOTE — TELEPHONE ENCOUNTER
Nurse triage came in requesting fill.     Routing to covering provider.     Ruby Beverly, [facility]

## 2021-07-23 NOTE — TELEPHONE ENCOUNTER
Medication: Alprazolam 0.5mg and omeprazole 40mg  Last Date Filled Alprazolam on 10/22/20 and omeprazole on 7/25/20.  Last appointment addressing medication use: 5/3/21.      Taken as prescribed from physician notes? YES    CSA in last year: YES    Random Utox in last year: NO  (if any of the above answer NO - schedule with PCP)     Opioids + benzodiazepines? NO  (if the above answer YES - schedule with PCP every 6 months)       All responses suggest: .     Ruby Beverly, CMA

## 2021-07-24 RX ORDER — OMEPRAZOLE 40 MG/1
CAPSULE, DELAYED RELEASE ORAL
Qty: 90 CAPSULE | Refills: 2 | Status: SHIPPED | OUTPATIENT
Start: 2021-07-24 | End: 2022-04-19

## 2021-07-24 NOTE — TELEPHONE ENCOUNTER
"Routing refill request to provider for review/approval because:  Alprazolam Drug not on the [facility] refill protocol     Last Written Prescription Date:  5/3/2021  Last Fill Quantity: 30,  # refills: 0   Last office visit provider:  5/3/2021 Dr. Griffith    ALPRAZolam (XANAX) 0.5 MG tablet 30 tablet 0 5/3/2021  No   Sig - Route: Take 1 tablet (0.5 mg total) by mouth as needed for sleep or anxiety. - Oral   Sent to pharmacy as: ALPRAZolam 0.5 mg tablet (XANAX)   E-Prescribing Status: Receipt confirmed by pharmacy (5/3/2021  8:08 AM CDT       Requested Prescriptions   Pending Prescriptions Disp Refills     omeprazole (PRILOSEC) 40 MG DR capsule         PPI Protocol Passed - 7/23/2021 10:17 AM        Passed - Not on Clopidogrel (unless Pantoprazole ordered)        Passed - No diagnosis of osteoporosis on record        Passed - Recent (12 mo) or future (30 days) visit within the authorizing provider's specialty     Patient has had an office visit with the authorizing provider or a provider within the authorizing providers department within the previous 12 mos or has a future within next 30 days. See \"Patient Info\" tab in inbasket, or \"Choose Columns\" in Meds & Orders section of the refill encounter.              Passed - Medication is active on med list        Passed - Patient is age 18 or older           ALPRAZolam (XANAX) 0.5 MG tablet         There is no refill protocol information for this order          Dorcas Reilly RN 07/24/21 3:47 PM  "

## 2021-07-24 NOTE — TELEPHONE ENCOUNTER
"Last Written Prescription Date:  1/20/2021  Last Fill Quantity: 90,  # refills: 1   Last office visit provider:  5/3/2021 Dr. Griffith     omeprazole (PRILOSEC) 40 MG capsule 90 capsule 1 1/20/2021  No   Sig: TAKE 1 CAPSULE(40 MG) BY MOUTH DAILY   Sent to pharmacy as: omeprazole 40 mg capsule,delayed release (PriLOSEC)   E-Prescribing Status: Receipt confirmed by pharmacy (1/20/2021  9:50 AM CST         Requested Prescriptions   Pending Prescriptions Disp Refills     omeprazole (PRILOSEC) 40 MG DR capsule         PPI Protocol Passed - 7/23/2021 10:17 AM        Passed - Not on Clopidogrel (unless Pantoprazole ordered)        Passed - No diagnosis of osteoporosis on record        Passed - Recent (12 mo) or future (30 days) visit within the authorizing provider's specialty     Patient has had an office visit with the authorizing provider or a provider within the authorizing providers department within the previous 12 mos or has a future within next 30 days. See \"Patient Info\" tab in inbasket, or \"Choose Columns\" in Meds & Orders section of the refill encounter.              Passed - Medication is active on med list        Passed - Patient is age 18 or older           ALPRAZolam (XANAX) 0.5 MG tablet         There is no refill protocol information for this order          Dorcas Reilly RN 07/24/21 3:59 PM  "

## 2021-07-25 RX ORDER — ALPRAZOLAM 0.5 MG
TABLET ORAL
Qty: 10 TABLET | Refills: 0 | Status: SHIPPED | OUTPATIENT
Start: 2021-07-25 | End: 2021-09-14

## 2021-09-13 DIAGNOSIS — F51.01 PRIMARY INSOMNIA: ICD-10-CM

## 2021-09-13 DIAGNOSIS — F41.1 GENERALIZED ANXIETY DISORDER: ICD-10-CM

## 2021-09-14 NOTE — TELEPHONE ENCOUNTER
Medication: alprazolam 0.5mg   Last Date Filled 7/25/2021  Last appointment addressing medication use: 5/3/2021    CSA in last year: NO    Random Utox in last year: NO  (if any of the above answer NO - schedule with PCP)     Opioids + benzodiazepines? NO  (if the above answer YES - schedule with PCP every 6 months)       All responses suggest:

## 2021-09-14 NOTE — TELEPHONE ENCOUNTER
Routing refill request to provider for review/approval because:  Controlled medication refill request.  Routing to provider's care team.    Last Written Prescription Date:  7/25/21  Last Fill Quantity: 10,  # refills: 0   Last office visit provider:  5/3/21       Requested Prescriptions   Pending Prescriptions Disp Refills     ALPRAZolam (XANAX) 0.5 MG tablet [Pharmacy Med Name: ALPRAZOLAM 0.5MG TABLETS] 30 tablet      Sig: TAKE 1 TABLET BY MOUTH AS NEEDED FOR SLEEP/OR ANXIETY       There is no refill protocol information for this order            Purvi Draper RN 09/13/21 9:24 PM

## 2021-09-16 RX ORDER — ALPRAZOLAM 0.5 MG
TABLET ORAL
Qty: 30 TABLET | Refills: 0 | Status: SHIPPED | OUTPATIENT
Start: 2021-09-16 | End: 2021-11-30 | Stop reason: ALTCHOICE

## 2021-10-02 ENCOUNTER — HEALTH MAINTENANCE LETTER (OUTPATIENT)
Age: 56
End: 2021-10-02

## 2021-10-19 PROBLEM — F32.9 MAJOR DEPRESSION: Chronic | Status: ACTIVE | Noted: 2018-09-03

## 2021-10-25 DIAGNOSIS — E78.5 HYPERLIPIDEMIA, UNSPECIFIED HYPERLIPIDEMIA TYPE: ICD-10-CM

## 2021-10-26 RX ORDER — ATORVASTATIN CALCIUM 10 MG/1
TABLET, FILM COATED ORAL
Qty: 90 TABLET | Refills: 0 | Status: SHIPPED | OUTPATIENT
Start: 2021-10-26 | End: 2022-08-25

## 2021-10-26 NOTE — TELEPHONE ENCOUNTER
"Routing refill request to provider for review/approval because:  Labs not current:  LDL  Early refill requested.    Last Written Prescription Date:  10/21/21  Last Fill Quantity: 90,  # refills: 0   Last office visit provider:  5/3/21     Requested Prescriptions   Pending Prescriptions Disp Refills     atorvastatin (LIPITOR) 10 MG tablet [Pharmacy Med Name: ATORVASTATIN 10MG TABLETS] 90 tablet 0     Sig: TAKE 1 TABLET(10 MG) BY MOUTH DAILY       Statins Protocol Failed - 10/25/2021  8:05 AM        Failed - LDL on file in past 12 months     Recent Labs   Lab Test 08/27/20  0831                Passed - No abnormal creatine kinase in past 12 months     No lab results found.             Passed - Recent (12 mo) or future (30 days) visit within the authorizing provider's specialty     Patient has had an office visit with the authorizing provider or a provider within the authorizing providers department within the previous 12 mos or has a future within next 30 days. See \"Patient Info\" tab in inbasket, or \"Choose Columns\" in Meds & Orders section of the refill encounter.              Passed - Medication is active on med list        Passed - Patient is age 18 or older             Elvin Robles RN 10/26/21 12:00 PM  "

## 2021-11-30 ENCOUNTER — OFFICE VISIT (OUTPATIENT)
Dept: FAMILY MEDICINE | Facility: CLINIC | Age: 56
End: 2021-11-30
Payer: COMMERCIAL

## 2021-11-30 VITALS
WEIGHT: 153 LBS | OXYGEN SATURATION: 97 % | BODY MASS INDEX: 24.01 KG/M2 | HEART RATE: 66 BPM | HEIGHT: 67 IN | DIASTOLIC BLOOD PRESSURE: 72 MMHG | SYSTOLIC BLOOD PRESSURE: 128 MMHG

## 2021-11-30 DIAGNOSIS — Z12.5 SCREENING FOR PROSTATE CANCER: ICD-10-CM

## 2021-11-30 DIAGNOSIS — F41.1 GENERALIZED ANXIETY DISORDER: Primary | Chronic | ICD-10-CM

## 2021-11-30 DIAGNOSIS — I10 BENIGN HYPERTENSION: Chronic | ICD-10-CM

## 2021-11-30 DIAGNOSIS — Z11.59 ENCOUNTER FOR HEPATITIS C SCREENING TEST FOR LOW RISK PATIENT: ICD-10-CM

## 2021-11-30 DIAGNOSIS — Z11.4 SCREENING FOR HIV (HUMAN IMMUNODEFICIENCY VIRUS): ICD-10-CM

## 2021-11-30 DIAGNOSIS — E78.5 HYPERLIPIDEMIA, UNSPECIFIED HYPERLIPIDEMIA TYPE: Chronic | ICD-10-CM

## 2021-11-30 DIAGNOSIS — F33.1 MODERATE EPISODE OF RECURRENT MAJOR DEPRESSIVE DISORDER (H): ICD-10-CM

## 2021-11-30 DIAGNOSIS — Z79.899 CONTROLLED SUBSTANCE AGREEMENT SIGNED: Chronic | ICD-10-CM

## 2021-11-30 DIAGNOSIS — F51.04 PSYCHOPHYSIOLOGICAL INSOMNIA: ICD-10-CM

## 2021-11-30 LAB
ALBUMIN SERPL-MCNC: 3.8 G/DL (ref 3.5–5)
ALP SERPL-CCNC: 58 U/L (ref 45–120)
ALT SERPL W P-5'-P-CCNC: 20 U/L (ref 0–45)
ANION GAP SERPL CALCULATED.3IONS-SCNC: 11 MMOL/L (ref 5–18)
AST SERPL W P-5'-P-CCNC: 15 U/L (ref 0–40)
BILIRUB SERPL-MCNC: 0.6 MG/DL (ref 0–1)
BUN SERPL-MCNC: 8 MG/DL (ref 8–22)
CALCIUM SERPL-MCNC: 9.4 MG/DL (ref 8.5–10.5)
CHLORIDE BLD-SCNC: 107 MMOL/L (ref 98–107)
CHOLEST SERPL-MCNC: 151 MG/DL
CO2 SERPL-SCNC: 22 MMOL/L (ref 22–31)
CREAT SERPL-MCNC: 0.82 MG/DL (ref 0.7–1.3)
FASTING STATUS PATIENT QL REPORTED: YES
GFR SERPL CREATININE-BSD FRML MDRD: >90 ML/MIN/1.73M2
GLUCOSE BLD-MCNC: 119 MG/DL (ref 70–125)
HDLC SERPL-MCNC: 48 MG/DL
HIV 1+2 AB+HIV1 P24 AG SERPL QL IA: NEGATIVE
LDLC SERPL CALC-MCNC: 90 MG/DL
POTASSIUM BLD-SCNC: 4.2 MMOL/L (ref 3.5–5)
PROT SERPL-MCNC: 6.6 G/DL (ref 6–8)
PSA SERPL-MCNC: 1.45 UG/L (ref 0–3.5)
SODIUM SERPL-SCNC: 140 MMOL/L (ref 136–145)
TRIGL SERPL-MCNC: 67 MG/DL

## 2021-11-30 PROCEDURE — 80061 LIPID PANEL: CPT | Performed by: FAMILY MEDICINE

## 2021-11-30 PROCEDURE — G0103 PSA SCREENING: HCPCS | Performed by: FAMILY MEDICINE

## 2021-11-30 PROCEDURE — 80053 COMPREHEN METABOLIC PANEL: CPT | Performed by: FAMILY MEDICINE

## 2021-11-30 PROCEDURE — 87389 HIV-1 AG W/HIV-1&-2 AB AG IA: CPT | Performed by: FAMILY MEDICINE

## 2021-11-30 PROCEDURE — 36415 COLL VENOUS BLD VENIPUNCTURE: CPT | Performed by: FAMILY MEDICINE

## 2021-11-30 PROCEDURE — 99214 OFFICE O/P EST MOD 30 MIN: CPT | Performed by: FAMILY MEDICINE

## 2021-11-30 PROCEDURE — 86803 HEPATITIS C AB TEST: CPT | Performed by: FAMILY MEDICINE

## 2021-11-30 RX ORDER — LORAZEPAM 1 MG/1
1 TABLET ORAL EVERY 8 HOURS PRN
Qty: 30 TABLET | Refills: 0 | Status: SHIPPED | OUTPATIENT
Start: 2021-11-30 | End: 2022-01-12

## 2021-11-30 RX ORDER — HYDROXYZINE HYDROCHLORIDE 25 MG/1
25 TABLET, FILM COATED ORAL EVERY 4 HOURS PRN
Qty: 40 TABLET | Refills: 3 | Status: SHIPPED | OUTPATIENT
Start: 2021-11-30 | End: 2023-03-05

## 2021-11-30 ASSESSMENT — ANXIETY QUESTIONNAIRES
6. BECOMING EASILY ANNOYED OR IRRITABLE: NEARLY EVERY DAY
1. FEELING NERVOUS, ANXIOUS, OR ON EDGE: NEARLY EVERY DAY
3. WORRYING TOO MUCH ABOUT DIFFERENT THINGS: NEARLY EVERY DAY
5. BEING SO RESTLESS THAT IT IS HARD TO SIT STILL: NEARLY EVERY DAY
GAD7 TOTAL SCORE: 18
2. NOT BEING ABLE TO STOP OR CONTROL WORRYING: NEARLY EVERY DAY
GAD7 TOTAL SCORE: 18
7. FEELING AFRAID AS IF SOMETHING AWFUL MIGHT HAPPEN: NOT AT ALL
8. IF YOU CHECKED OFF ANY PROBLEMS, HOW DIFFICULT HAVE THESE MADE IT FOR YOU TO DO YOUR WORK, TAKE CARE OF THINGS AT HOME, OR GET ALONG WITH OTHER PEOPLE?: SOMEWHAT DIFFICULT
4. TROUBLE RELAXING: NEARLY EVERY DAY
7. FEELING AFRAID AS IF SOMETHING AWFUL MIGHT HAPPEN: NOT AT ALL
GAD7 TOTAL SCORE: 18

## 2021-11-30 ASSESSMENT — PATIENT HEALTH QUESTIONNAIRE - PHQ9
10. IF YOU CHECKED OFF ANY PROBLEMS, HOW DIFFICULT HAVE THESE PROBLEMS MADE IT FOR YOU TO DO YOUR WORK, TAKE CARE OF THINGS AT HOME, OR GET ALONG WITH OTHER PEOPLE: VERY DIFFICULT
SUM OF ALL RESPONSES TO PHQ QUESTIONS 1-9: 10
SUM OF ALL RESPONSES TO PHQ QUESTIONS 1-9: 10

## 2021-11-30 ASSESSMENT — MIFFLIN-ST. JEOR: SCORE: 1492.63

## 2021-11-30 NOTE — LETTER
December 3, 2021      Elvin Trivedi  [address] [address]        Dear ,    We are writing to inform you of your test results.    Your screening labs were all normal/negative.  Your electrolytes, kidney and liver function and glucose test results were all normal.  Your cholesterol numbers looked good.  See you in 6 months.  Happy holidays.    Resulted Orders   Hepatitis C antibody   Result Value Ref Range    Hepatitis C Antibody Negative Negative    Narrative    Assay performance characteristics have not been established for newborns, infants, children (<18 years) or populations of immunocompromised or immunosuppressed patients.    HIV Antigen Antibody Combo   Result Value Ref Range    HIV Antigen Antibody Combo Negative Negative   Lipid panel reflex to direct LDL Fasting   Result Value Ref Range    Cholesterol 151 <=199 mg/dL    Triglycerides 67 <=149 mg/dL    Direct Measure HDL 48 >=40 mg/dL      Comment:      HDL Cholesterol Reference Range:     0-2 years:   No reference ranges established for patients under 2 years old  at [facility] for lipid analytes.    2-8 years:  Greater than 45 mg/dL     18 years and older:   Female: Greater than or equal to 50 mg/dL   Male:   Greater than or equal to 40 mg/dL    LDL Cholesterol Calculated 90 <=129 mg/dL    Patient Fasting > 8hrs? Yes    Comprehensive metabolic panel (BMP + Alb, Alk Phos, ALT, AST, Total. Bili, TP)   Result Value Ref Range    Sodium 140 136 - 145 mmol/L    Potassium 4.2 3.5 - 5.0 mmol/L    Chloride 107 98 - 107 mmol/L    Carbon Dioxide (CO2) 22 22 - 31 mmol/L    Anion Gap 11 5 - 18 mmol/L    Urea Nitrogen 8 8 - 22 mg/dL    Creatinine 0.82 0.70 - 1.30 mg/dL    Calcium 9.4 8.5 - 10.5 mg/dL    Glucose 119 70 - 125 mg/dL    Alkaline Phosphatase 58 45 - 120 U/L    AST 15 0 - 40 U/L    ALT 20 0 - 45 U/L    Protein Total 6.6 6.0 - 8.0 g/dL    Albumin 3.8 3.5 - 5.0 g/dL    Bilirubin Total 0.6 0.0 - 1.0 mg/dL    GFR  Estimate >90 >60 mL/min/1.73m2      Comment:      As of July 11, 2021, eGFR is calculated by the CKD-EPI creatinine equation, without race adjustment. eGFR can be influenced by muscle mass, exercise, and diet. The reported eGFR is an estimation only and is only applicable if the renal function is stable.   PSA, screen   Result Value Ref Range    Prostate Specific Antigen Screen 1.45 0.00 - 3.50 ug/L    Narrative    Assay Method is Abbott Prostate-Specific Antigen (PSA)  Standard-WHO 1st International (90:10)       If you have any questions or concerns, please call the clinic at the number listed above.       Sincerely,      Ros Griffith MD

## 2021-11-30 NOTE — PROGRESS NOTES
SILVIA-7   [employer], 2002; Used with Permission) 11/30/2021   1. Feeling nervous, anxious, or on edge Nearly every day   2. Not being able to stop or control worrying Nearly every day   3. Worrying too much about different things Nearly every day   4. Trouble relaxing Nearly every day   5. Being so restless that it is hard to sit still Nearly every day   6. Becoming easily annoyed or irritable Nearly every day   7. Feeling afraid, as if something awful might happen Not at all   SILVIA 7 TOTAL SCORE 18 (severe anxiety)     PHQ-9 ([employer]) 11/30/2021   1.  Little interest or pleasure in doing things Several days   2.  Feeling down, depressed, or hopeless Several days   3.  Trouble falling or staying asleep, or sleeping too much Nearly every day   4.  Feeling tired or having little energy More than half the days   5.  Poor appetite or overeating Several days   6.  Feeling bad about yourself Not at all   7.  Trouble concentrating Several days   8.  Moving slowly or restless Several days   9.  Suicidal or self-harm thoughts Not at all   PHQ-9 via MyChart TOTAL SCORE-----> 10 (Moderate depression)   Difficulty at work, home, or with people Very difficult

## 2021-11-30 NOTE — PROGRESS NOTES
"Chief Complaint   Patient presents with     Medication Update     Refill Request     lorazepam       HPI: Elvin Trivedi is a 55 year old White male who presents today for a medication check for his anxiety and depression. His SILVIA score back in May was 10 and today it was 18. His PHQ in May was 16 and is now 10 today. He feels that his anxiety has worsened as of lately due to him making a career change. 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 He feels very stressed about this, as he has never put \"all of his eggs in one basket.\" He is going to give his notice at his current job this week. The last two weeks have been increasingly difficult for him. He hasn't been able to stay asleep. He is currently taking 40mg of prozac and is not interested in adding on wellbutrin today. He states that he already takes too many pills. He has been out of his alprazolam, so he hasn't been able to take that to help him sleep. He is requesting something to help him sleep today.     His blood pressure is well controlled today at 128/72. He is taking Norvasc 5mg and Losartan 100mg daily. He is taking 10mg of lipitor daily. His last LDL in August of 2020 was 113. He feels his GERD is well controlled and he is currently trying to wean himself off of the omeprazole 40mg because he doesn't like taking as many pills as he does.    He did not want to receive his meningitis vaccine or the flu vaccine today, as he did not want any side effects. We did discuss that he is eligible for his moderna booster in late December, which will be 6 months after his 2nd vaccine.      ROS:Review of Systems - negative except for what's listed in the HPI      SH: The Patient's  reports that he has been smoking cigarettes. He has been smoking about 0.50 packs per day. He has never used smokeless tobacco. He reports current alcohol use. He reports that he does not use drugs.      FH: The Patient's " "family history includes Aneurysm in his brother; Carotid Endarterectomy in his mother; Dementia in his mother; Heart Disease in his father and mother; Hypertension in his mother.     Meds:    Current Outpatient Medications   Medication     amLODIPine (NORVASC) 5 MG tablet     atorvastatin (LIPITOR) 10 MG tablet     FLUoxetine (PROZAC) 40 MG capsule     hydrOXYzine (ATARAX) 25 MG tablet     ibuprofen (ADVIL/MOTRIN) 200 MG tablet     LORazepam (ATIVAN) 1 MG tablet     losartan (COZAAR) 100 MG tablet     omeprazole (PRILOSEC) 40 MG DR capsule     No current facility-administered medications for this visit.       O:  /72   Pulse 66   Ht 1.702 m (5' 7\")   Wt 69.4 kg (153 lb)   SpO2 97%   BMI 23.96 kg/m      Physical Examination:   General appearance - alert, well appearing, and in no distress  Mental status - alert, oriented to person, place, and time  Chest - clear to auscultation, no wheezes, rales or rhonchi, symmetric air entry  Heart - normal rate and regular rhythm, S1 and S2 normal, no murmurs noted  Abdomen - soft, nontender, nondistended, no masses or organomegaly  Extremities - peripheral pulses normal, no peripheral edema  Psych - appears anxious, with flat affect       A/P:       Generalized anxiety disorder  SILVIA today was 18 and last score was 10. Adding on hydroxyzine today   - hydrOXYzine (ATARAX) 25 MG tablet  Dispense: 40 tablet; Refill: 3    Hyperlipidemia, unspecified hyperlipidemia type  Checking lipid panel today, patient is currently taking liptor 10mg   - Lipid panel reflex to direct LDL Fasting  - Lipid panel reflex to direct LDL Fasting    Controlled substance agreement signed  Signed for lorazepam today. Patient agreeable to plan.     Benign hypertension  Well controlled today at 128/72 with taking amlodipine 5mg and losartan 100mg.   - Comprehensive metabolic panel (BMP + Alb, Alk Phos, ALT, AST, Total. Bili, TP)  - Comprehensive metabolic panel (BMP + Alb, Alk Phos, ALT, AST, Total. " Ciarra, TP)    Moderate episode of recurrent major depressive disorder (H)  PHQ score today is 10, last score was 16.     Psychophysiological insomnia  Switched alprazolam to lorazepam to help him sleep longer  - LORazepam (ATIVAN) 1 MG tablet  Dispense: 30 tablet; Refill: 0    Encounter for hepatitis C screening test for low risk patient  Health maintenance labs  - Hepatitis C antibody  - Hepatitis C antibody    Screening for HIV (human immunodeficiency virus)  Health maintenance labs  - HIV Antigen Antibody Combo  - HIV Antigen Antibody Combo    Screening for prostate cancer  - PSA, screen  - PSA, screen      Patient Active Problem List   Diagnosis     Generalized anxiety disorder     History of splenectomy     Hyperlipidemia     GERD without esophagitis     Controlled substance agreement signed     Benign hypertension     Insomnia     Common wart     Moderate major depression (H)     Benign neoplasm of rectum     Benign neoplasm of transverse colon     Diverticular disease     Polyp of colon       Current Outpatient Medications   Medication     amLODIPine (NORVASC) 5 MG tablet     atorvastatin (LIPITOR) 10 MG tablet     FLUoxetine (PROZAC) 40 MG capsule     hydrOXYzine (ATARAX) 25 MG tablet     ibuprofen (ADVIL/MOTRIN) 200 MG tablet     LORazepam (ATIVAN) 1 MG tablet     losartan (COZAAR) 100 MG tablet     omeprazole (PRILOSEC) 40 MG DR capsule     No current facility-administered medications for this visit.     Checking labs today. Adding on hydroxyzine and lorazepam for anxiety. CSA signed. Patient will return to clinic in 6 months or as needed.     Zaida Black, Nurse Practitioner Student    Patient was seen, evaluated and plan formulated in conjunction with me and I agree with the above documentation.  Ros Griffith MD      This note has been dictated using voice recognition software. Any grammatical or context distortions are unintentional and inherent to the software.

## 2021-11-30 NOTE — LETTER
[facility]IG[facility] [place]  11/29/21  Patient: Elvin Trivedi  YOB: 1965  Medical Record Number: [id number]                                                                                  Non-Opioid Controlled Substance Agreement    This is an agreement between you and your provider regarding safe and appropriate use of controlled substances prescribed by your care team. Controlled substances are?medicines that can cause physical and mental dependence (abuse).     There are strict laws about having and using these medicines. We here at [facility] are  committed to working with you in your efforts to get better. To support you in this work, we'll help you schedule regular office appointments for medicine refills. If we must cancel or change your appointment for any reason, we'll make sure you have enough medicine to last until your next appointment.     As a Provider, I will:     Listen carefully to your concerns while treating you with respect.     Recommend a treatment plan that I believe is in your best interest and may involve therapies other than medicine.      Talk with you often about the possible benefits and the risk of harm of any medicine that we prescribe for you.    Assess the safety of this medicine and check how well it works.      Provide a plan on how to taper (discontinue or go off) using this medicine if the decision is made to stop its use.      ::  As a Patient, I understand controlled substances:       Are prescribed by my care provider to help me function or work and manage my condition(s).?    Are strong medicines and can cause serious side effects.       Need to be taken exactly as prescribed.?Combining controlled substances with certain medicines or chemicals (such as illegal drugs, alcohol, sedatives, sleeping pills, and benzodiazepines) can be dangerous or even fatal.? If I stop taking my medicines suddenly, I may have severe withdrawal symptoms.     The  risks, benefits, and side effects of these medicine(s) were explained to me. I agree that:    1. I will take part in other treatments as advised by my care team. This may be psychiatry or counseling, physical therapy, behavioral therapy, group treatment or a referral to specialist.    2. I will keep all my appointments and understand this is part of the monitoring of controlled substances.?My care team may require an office visit for EVERY controlled substance refill. If I miss appointments or don t follow instructions, my care team may stop my medicine    3. I will take my medicines as prescribed. I will not change the dose or schedule unless my care team tells me to. There will be no refills if I run out early.      4. I may be asked to come to the clinic and complete a urine drug test or complete a pill count. If I don t give a urine sample or participate in a pill count, the care team may stop my medicine.    5. I will only receive controlled substance prescriptions from this clinic. If I am treated by another provider, I will tell them that I am taking controlled substances and that I have a treatment agreement with this provider. I will inform my [facility] care team within one business day if I am given a prescription for any controlled substance by another healthcare provider. My [facility] care team can contact other providers and pharmacists about my use of any medicines.    6. It is up to me to make sure that I don't run out of my medicines on weekends or holidays.?If my care team is willing to refill my prescription without a visit, I must request refills only during office hours. Refills may take up to 3 business days to process. I will use one pharmacy to fill all my controlled substance prescriptions. I will notify the clinic about any changes to my insurance or medicine availability.    7. I am responsible for my prescriptions. If the medicine/prescription is lost, stolen or destroyed,  it will not be replaced.?I also agree not to share controlled substance medicines with anyone.     8. I am aware I should not use any illegal or recreational drugs. I agree not to drink alcohol unless my care team says I can.     9. If I enroll in the [place] Medical Cannabis program, I will tell my care team before my next refill.    10. I will tell my care team right away if I become pregnant, have a new medical problem treated outside of my regular clinic, or have a change in my medicines.     11. I understand that this medicine can affect my thinking, judgment and reaction time.? Alcohol and drugs affect the brain and body, which can affect the safety of my driving. Being under the influence of alcohol or drugs can affect my decision-making, behaviors, personal safety and the safety of others. Driving while impaired (DWI) can occur if a person is driving, operating or in physical control of a car, motorcycle, boat, snowmobile, ATV, motorbike, off-road vehicle or any other motor vehicle ([place] Statute 169A.20). I understand the risk if I choose to drive or operate any vehicle or machinery.    I understand that if I do not follow any of the conditions above, my prescriptions or treatment may be stopped or changed.   I agree that my provider, clinic care team and pharmacy may work with any city, state or federal law enforcement agency that investigates the misuse, sale or other diversion of my controlled medicine. I will allow my provider to discuss my care with, or share a copy of, this agreement with any other treating provider, pharmacy or emergency room where I receive care.     I have read this agreement and have asked questions about anything I did not understand.    ________________________________________________________  Patient Signature - Elvin A Blomster     ___________________                   Date     ________________________________________________________  Provider Signature - Ros Griffith MD        ___________________                   Date     ________________________________________________________  Witness Signature (required if provider not present while patient signing)          ___________________                   Date

## 2021-12-01 LAB — HCV AB SERPL QL IA: NEGATIVE

## 2021-12-01 ASSESSMENT — PATIENT HEALTH QUESTIONNAIRE - PHQ9: SUM OF ALL RESPONSES TO PHQ QUESTIONS 1-9: 10

## 2021-12-01 ASSESSMENT — ANXIETY QUESTIONNAIRES: GAD7 TOTAL SCORE: 18

## 2022-01-12 ENCOUNTER — MYC REFILL (OUTPATIENT)
Dept: FAMILY MEDICINE | Facility: CLINIC | Age: 57
End: 2022-01-12
Payer: COMMERCIAL

## 2022-01-12 DIAGNOSIS — F51.04 PSYCHOPHYSIOLOGICAL INSOMNIA: ICD-10-CM

## 2022-01-15 NOTE — TELEPHONE ENCOUNTER
Routing refill request to provider for review/approval because:  Controlled Substance Request.    Last Written Prescription Date:  11/30/2021  Last Fill Quantity: 30,  # refills: 0   Last office visit provider:  11/30/2021 with Dr Griffith.    Requested Prescriptions   Pending Prescriptions Disp Refills     LORazepam (ATIVAN) 1 MG tablet 30 tablet 0     Sig: Take 1 tablet (1 mg) by mouth every 8 hours as needed for anxiety or sleep       There is no refill protocol information for this order          Lisbeth Horowitz 01/14/22 9:16 PM

## 2022-01-17 ENCOUNTER — MYC REFILL (OUTPATIENT)
Dept: FAMILY MEDICINE | Facility: CLINIC | Age: 57
End: 2022-01-17
Payer: COMMERCIAL

## 2022-01-17 DIAGNOSIS — F51.04 PSYCHOPHYSIOLOGICAL INSOMNIA: ICD-10-CM

## 2022-01-17 RX ORDER — LORAZEPAM 1 MG/1
1 TABLET ORAL EVERY 8 HOURS PRN
Qty: 30 TABLET | Refills: 0 | Status: CANCELLED | OUTPATIENT
Start: 2022-01-17

## 2022-01-17 RX ORDER — LORAZEPAM 1 MG/1
1 TABLET ORAL EVERY 8 HOURS PRN
Qty: 30 TABLET | Refills: 0 | Status: SHIPPED | OUTPATIENT
Start: 2022-01-17 | End: 2022-04-05

## 2022-01-17 NOTE — TELEPHONE ENCOUNTER
Medication: lorazepam 1 MG tablet  Last Date Filled 11/30/21  Last appointment addressing medication use: 11/30/21      Taken as prescribed from physician notes? YES    CSA in last year: YES    Random Utox in last year: NO  (if any of the above answer NO - schedule with PCP)     Opioids + benzodiazepines? NO  (if the above answer YES - schedule with PCP every 6 months)       All responses suggest: .

## 2022-01-18 DIAGNOSIS — K21.9 GERD WITHOUT ESOPHAGITIS: ICD-10-CM

## 2022-01-20 RX ORDER — OMEPRAZOLE 40 MG/1
CAPSULE, DELAYED RELEASE ORAL
Qty: 90 CAPSULE | Refills: 2 | OUTPATIENT
Start: 2022-01-20

## 2022-01-22 ENCOUNTER — HEALTH MAINTENANCE LETTER (OUTPATIENT)
Age: 57
End: 2022-01-22

## 2022-01-28 DIAGNOSIS — I10 BENIGN HYPERTENSION: ICD-10-CM

## 2022-01-30 RX ORDER — AMLODIPINE BESYLATE 5 MG/1
TABLET ORAL
Qty: 90 TABLET | Refills: 2 | Status: SHIPPED | OUTPATIENT
Start: 2022-01-30 | End: 2022-10-31

## 2022-01-30 NOTE — TELEPHONE ENCOUNTER
"Last Written Prescription Date:  10/31/21  Last Fill Quantity: 90,  # refills: 0   Last office visit provider:  11/30/21     Requested Prescriptions   Pending Prescriptions Disp Refills     amLODIPine (NORVASC) 5 MG tablet [Pharmacy Med Name: AMLODIPINE BESYLATE 5MG TABLETS] 90 tablet 0     Sig: TAKE 1 TABLET(5 MG) BY MOUTH DAILY       Calcium Channel Blockers Protocol  Passed - 1/28/2022  3:27 AM        Passed - Blood pressure under 140/90 in past 12 months     BP Readings from Last 3 Encounters:   11/30/21 128/72   05/03/21 126/74   03/09/21 128/82                 Passed - Recent (12 mo) or future (30 days) visit within the authorizing provider's specialty     Patient has had an office visit with the authorizing provider or a provider within the authorizing providers department within the previous 12 mos or has a future within next 30 days. See \"Patient Info\" tab in inbasket, or \"Choose Columns\" in Meds & Orders section of the refill encounter.              Passed - Medication is active on med list        Passed - Patient is age 18 or older        Passed - Normal serum creatinine on file in past 12 months     Recent Labs   Lab Test 11/30/21  0746   CR 0.82       Ok to refill medication if creatinine is low               annelise soriano RN 01/30/22 11:59 AM  "

## 2022-04-05 ENCOUNTER — MYC REFILL (OUTPATIENT)
Dept: FAMILY MEDICINE | Facility: CLINIC | Age: 57
End: 2022-04-05
Payer: COMMERCIAL

## 2022-04-05 DIAGNOSIS — F51.04 PSYCHOPHYSIOLOGICAL INSOMNIA: ICD-10-CM

## 2022-04-05 RX ORDER — LORAZEPAM 1 MG/1
1 TABLET ORAL EVERY 8 HOURS PRN
Qty: 30 TABLET | Refills: 0 | Status: SHIPPED | OUTPATIENT
Start: 2022-04-05 | End: 2022-06-14

## 2022-04-05 NOTE — TELEPHONE ENCOUNTER
Routing refill request to provider for review/approval because:  Drug not on the [facility] refill protocol controlled substance refill    Last Written Prescription Date:  1/17/22  Last Fill Quantity: 30,  # refills: 0   Last office visit provider:  11/30/21     Requested Prescriptions   Pending Prescriptions Disp Refills     LORazepam (ATIVAN) 1 MG tablet 30 tablet 0     Sig: Take 1 tablet (1 mg) by mouth every 8 hours as needed for anxiety or sleep       There is no refill protocol information for this order          Jailyn Singh 04/05/22 1:03 PM

## 2022-04-17 DIAGNOSIS — K21.9 GERD WITHOUT ESOPHAGITIS: ICD-10-CM

## 2022-04-19 RX ORDER — OMEPRAZOLE 40 MG/1
CAPSULE, DELAYED RELEASE ORAL
Qty: 90 CAPSULE | Refills: 2 | Status: SHIPPED | OUTPATIENT
Start: 2022-04-19 | End: 2023-01-06

## 2022-04-19 NOTE — TELEPHONE ENCOUNTER
"Last Written Prescription Date:  7/24/21  Last Fill Quantity: 90,  # refills: 2   Last office visit provider:  11/30/21     Requested Prescriptions   Pending Prescriptions Disp Refills     omeprazole (PRILOSEC) 40 MG DR capsule [Pharmacy Med Name: OMEPRAZOLE 40MG CAPSULES] 90 capsule 2     Sig: TAKE 1 CAPSULE(40 MG) BY MOUTH DAILY       PPI Protocol Passed - 4/19/2022  9:38 AM        Passed - Not on Clopidogrel (unless Pantoprazole ordered)        Passed - No diagnosis of osteoporosis on record        Passed - Recent (12 mo) or future (30 days) visit within the authorizing provider's specialty     Patient has had an office visit with the authorizing provider or a provider within the authorizing providers department within the previous 12 mos or has a future within next 30 days. See \"Patient Info\" tab in inbasket, or \"Choose Columns\" in Meds & Orders section of the refill encounter.              Passed - Medication is active on med list        Passed - Patient is age 18 or older             Elvin Robles RN 04/19/22 9:38 AM  "

## 2022-06-14 ENCOUNTER — MYC REFILL (OUTPATIENT)
Dept: FAMILY MEDICINE | Facility: CLINIC | Age: 57
End: 2022-06-14
Payer: COMMERCIAL

## 2022-06-14 DIAGNOSIS — F51.04 PSYCHOPHYSIOLOGICAL INSOMNIA: ICD-10-CM

## 2022-06-15 NOTE — TELEPHONE ENCOUNTER
Medication: lorazepam 1mg  Last Date Filled 4/5/22  Last appointment addressing medication use: 11/30/21    CSA in last year: YES    Random Utox in last year: NO  (if any of the above answer NO - schedule with PCP)     Opioids + benzodiazepines? NO  (if the above answer YES - schedule with PCP every 6 months)       All responses suggest:

## 2022-06-15 NOTE — TELEPHONE ENCOUNTER
Routing refill request to provider for review/approval because:  Controlled substance request    Last Written Prescription Date:  4/5/22  Last Fill Quantity: 30,  # refills: 0   Last office visit provider:  11/30/22     Requested Prescriptions   Pending Prescriptions Disp Refills     LORazepam (ATIVAN) 1 MG tablet 30 tablet 0     Sig: Take 1 tablet (1 mg) by mouth every 8 hours as needed for anxiety or sleep       There is no refill protocol information for this order          Elvin Robles RN 06/15/22 7:57 AM

## 2022-06-16 RX ORDER — LORAZEPAM 1 MG/1
1 TABLET ORAL EVERY 8 HOURS PRN
Qty: 30 TABLET | Refills: 0 | Status: SHIPPED | OUTPATIENT
Start: 2022-06-16 | End: 2022-08-04

## 2022-06-21 DIAGNOSIS — I10 BENIGN HYPERTENSION: ICD-10-CM

## 2022-06-21 RX ORDER — LOSARTAN POTASSIUM 100 MG/1
TABLET ORAL
Qty: 90 TABLET | Refills: 1 | Status: SHIPPED | OUTPATIENT
Start: 2022-06-21 | End: 2023-01-06

## 2022-06-21 NOTE — TELEPHONE ENCOUNTER
"Last [id number] Prescription Date:  12/31/2021  Last Fill Quantity: 90,  # refills: 1   Last office visit provider:  11/30/2021     Requested Prescriptions   Pending Prescriptions Disp Refills     losartan (COZAAR) 100 MG tablet [Pharmacy Med Name: LOSARTAN 100MG TABLETS] 90 tablet 1     Sig: TAKE 1 TABLET(100 MG) BY MOUTH DAILY       Angiotensin-II Receptors Passed - 6/21/2022  3:33 PM        Passed - Last blood pressure under 140/90 in past 12 months     BP Readings from Last 3 Encounters:   11/30/21 128/72   05/03/21 126/74   03/09/21 128/82                 Passed - Recent (12 mo) or future (30 days) visit within the authorizing provider's specialty     Patient has had an office visit with the authorizing provider or a provider within the authorizing providers department within the previous 12 mos or has a future within next 30 days. See \"Patient Info\" tab in inbasket, or \"Choose Columns\" in Meds & Orders section of the refill encounter.              Passed - Medication is active on med list        Passed - Patient is age 18 or older        Passed - Normal serum creatinine on file in past 12 months     Recent Labs   Lab Test 11/30/21  0746   CR 0.82       Ok to refill medication if creatinine is low          Passed - Normal serum potassium on file in past 12 months     Recent Labs   Lab Test 11/30/21  0746   POTASSIUM 4.2                         Alcira Granda RN 06/21/22 3:34 PM  "

## 2022-06-22 ENCOUNTER — TELEPHONE (OUTPATIENT)
Dept: FAMILY MEDICINE | Facility: CLINIC | Age: 57
End: 2022-06-22

## 2022-06-22 NOTE — TELEPHONE ENCOUNTER
Left message to call back for: Pt  Information to relay to patient: Needs to schedule physical with med check.     Nisha Potter CMA.

## 2022-06-22 NOTE — LETTER
Elvin Beachter  [address] [facility] [address]      July 14, 2022      Dear Elvin    We have been unable to contact you via telephone. In reviewing your records, we have determined a gap in your preventive services. Based on your age and health history, we recommend the following service(s):      ? General Physical  ? Med check      If you have had the service elsewhere, please contact us so we can update our records. Please let us know if you have transferred your care to another clinic.    Please call [phone] to schedule this appointment. Fax number [phone].    We believe that a strong preventive care program, including regular physicals and follow-up care is an important part of a healthy lifestyle and we are committed to helping you maintain your health.    Thank you for choosing us as your health care provider.        Sincerely,     [facility]

## 2022-06-22 NOTE — TELEPHONE ENCOUNTER
----- Message from Ros Griffith MD sent at 6/21/2022  4:41 PM CDT -----  Regarding: Patient needs appointment  Please call the patient and get him scheduled for either a physical or med check, which ever is appropriate, because he is overdue for his hypertension recheck.

## 2022-07-14 NOTE — TELEPHONE ENCOUNTER
Left message to call back for: Patient  Information to relay to patient: Needs to schedule a physical and med check. Letter sent to patient.     Nisha Potter CMA.

## 2022-08-04 ENCOUNTER — OFFICE VISIT (OUTPATIENT)
Dept: FAMILY MEDICINE | Facility: CLINIC | Age: 57
End: 2022-08-04
Payer: COMMERCIAL

## 2022-08-04 VITALS
DIASTOLIC BLOOD PRESSURE: 64 MMHG | OXYGEN SATURATION: 95 % | WEIGHT: 157.4 LBS | SYSTOLIC BLOOD PRESSURE: 126 MMHG | HEART RATE: 52 BPM | BODY MASS INDEX: 24.65 KG/M2 | RESPIRATION RATE: 12 BRPM

## 2022-08-04 DIAGNOSIS — K21.9 GERD WITHOUT ESOPHAGITIS: Chronic | ICD-10-CM

## 2022-08-04 DIAGNOSIS — I10 BENIGN HYPERTENSION: Chronic | ICD-10-CM

## 2022-08-04 DIAGNOSIS — F41.1 GENERALIZED ANXIETY DISORDER: Primary | Chronic | ICD-10-CM

## 2022-08-04 DIAGNOSIS — E78.5 HYPERLIPIDEMIA, UNSPECIFIED HYPERLIPIDEMIA TYPE: Chronic | ICD-10-CM

## 2022-08-04 DIAGNOSIS — F51.04 PSYCHOPHYSIOLOGICAL INSOMNIA: Chronic | ICD-10-CM

## 2022-08-04 DIAGNOSIS — F33.41 RECURRENT MAJOR DEPRESSIVE DISORDER, IN PARTIAL REMISSION (H): Chronic | ICD-10-CM

## 2022-08-04 DIAGNOSIS — Z79.899 CONTROLLED SUBSTANCE AGREEMENT SIGNED: Chronic | ICD-10-CM

## 2022-08-04 PROBLEM — G47.00 INSOMNIA: Chronic | Status: ACTIVE | Noted: 2017-05-16

## 2022-08-04 PROCEDURE — 99214 OFFICE O/P EST MOD 30 MIN: CPT | Performed by: FAMILY MEDICINE

## 2022-08-04 RX ORDER — FLUOXETINE 40 MG/1
CAPSULE ORAL
Qty: 90 CAPSULE | Refills: 1 | Status: SHIPPED | OUTPATIENT
Start: 2022-08-04 | End: 2023-01-06

## 2022-08-04 RX ORDER — RAMELTEON 8 MG/1
8 TABLET ORAL
Qty: 30 TABLET | Refills: 0 | Status: SHIPPED | OUTPATIENT
Start: 2022-08-04 | End: 2022-09-22

## 2022-08-04 RX ORDER — LORAZEPAM 1 MG/1
1 TABLET ORAL EVERY 8 HOURS PRN
Qty: 30 TABLET | Refills: 0 | Status: SHIPPED | OUTPATIENT
Start: 2022-08-04 | End: 2022-11-08

## 2022-08-04 ASSESSMENT — ANXIETY QUESTIONNAIRES
3. WORRYING TOO MUCH ABOUT DIFFERENT THINGS: MORE THAN HALF THE DAYS
1. FEELING NERVOUS, ANXIOUS, OR ON EDGE: MORE THAN HALF THE DAYS
GAD7 TOTAL SCORE: 13
7. FEELING AFRAID AS IF SOMETHING AWFUL MIGHT HAPPEN: SEVERAL DAYS
5. BEING SO RESTLESS THAT IT IS HARD TO SIT STILL: MORE THAN HALF THE DAYS
IF YOU CHECKED OFF ANY PROBLEMS ON THIS QUESTIONNAIRE, HOW DIFFICULT HAVE THESE PROBLEMS MADE IT FOR YOU TO DO YOUR WORK, TAKE CARE OF THINGS AT HOME, OR GET ALONG WITH OTHER PEOPLE: SOMEWHAT DIFFICULT
6. BECOMING EASILY ANNOYED OR IRRITABLE: NEARLY EVERY DAY
7. FEELING AFRAID AS IF SOMETHING AWFUL MIGHT HAPPEN: SEVERAL DAYS
2. NOT BEING ABLE TO STOP OR CONTROL WORRYING: SEVERAL DAYS
4. TROUBLE RELAXING: MORE THAN HALF THE DAYS
GAD7 TOTAL SCORE: 13
GAD7 TOTAL SCORE: 13
8. IF YOU CHECKED OFF ANY PROBLEMS, HOW DIFFICULT HAVE THESE MADE IT FOR YOU TO DO YOUR WORK, TAKE CARE OF THINGS AT HOME, OR GET ALONG WITH OTHER PEOPLE?: SOMEWHAT DIFFICULT

## 2022-08-04 ASSESSMENT — PAIN SCALES - GENERAL: PAINLEVEL: NO PAIN (0)

## 2022-08-04 ASSESSMENT — PATIENT HEALTH QUESTIONNAIRE - PHQ9
SUM OF ALL RESPONSES TO PHQ QUESTIONS 1-9: 15
10. IF YOU CHECKED OFF ANY PROBLEMS, HOW DIFFICULT HAVE THESE PROBLEMS MADE IT FOR YOU TO DO YOUR WORK, TAKE CARE OF THINGS AT HOME, OR GET ALONG WITH OTHER PEOPLE: SOMEWHAT DIFFICULT
SUM OF ALL RESPONSES TO PHQ QUESTIONS 1-9: 15

## 2022-08-04 NOTE — PROGRESS NOTES
Generalized anxiety disorder  SILVIA-7 score is 13 and was previously 18.  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  Will refill med as he does not want to add another medication.  - FLUoxetine (PROZAC) 40 MG capsule  Dispense: 90 capsule; Refill: 1    Psychophysiological insomnia  Does have problems sleeping.  Notes he might be coming more reliant on lorazepam, but I am willing to give him a small amount for emergency cases.  I would like him to do a trial of ramelteon to see if its helpful.  - LORazepam (ATIVAN) 1 MG tablet  Dispense: 30 tablet; Refill: 0  - ramelteon (ROZEREM) 8 MG tablet  Dispense: 30 tablet; Refill: 0    Recurrent major depressive disorder, in partial remission (H)  PHQ-9 score is 15 up from previous 10.  He thinks if he could sleep better, his score would be better.  Discussed watching his drinking habits.  - FLUoxetine (PROZAC) 40 MG capsule  Dispense: 90 capsule; Refill: 1    Controlled substance agreement signed  CSA signed 12/20/2021 is active.    Benign hypertension  Well-controlled on Norvasc 5 mg and Cozaar 100 mg.  Will monitor labs.  - Basic metabolic panel    Hyperlipidemia, unspecified hyperlipidemia type  On Lipitor 10 mg, will monitor.  Last LDL 90.  - Lipid panel reflex to direct LDL Fasting    GERD without esophagitis  Using omeprazole when needed.  Drinking my contribute to this.    He has been scheduled for a lab appointment so that he can be fasting for labs to be done on 8/11/2022.  I will get back to him on his lab results by Ki and he should follow-up with me in late December for an annual physical.      Subjective   Elvin is a 56 year old, presenting for the following health issues:  Medication Update (Med check. Needs refill on Lorazepam. Has been cutting back on fluoxetine and lipitor. )      History of Present Illness       Reason for visit:  Med check    He eats 0-1 servings of fruits and vegetables daily.He consumes 1 sweetened  beverage(s) daily.He exercises with enough effort to increase his heart rate 9 or less minutes per day.  He exercises with enough effort to increase his heart rate 3 or less days per week. He is missing 2 dose(s) of medications per week.  He is not taking prescribed medications regularly due to side effects.    Today's PHQ-9         PHQ-9 Total Score: 15    PHQ-9 Q9 Thoughts of better off dead/self-harm past 2 weeks :   Not at all    How difficult have these problems made it for you to do your work, take care of things at home, or get along with other people: Somewhat difficult  Today's SILVIA-7 Score: 13       Hyperlipidemia Follow-Up      Are you regularly taking any medication or supplement to lower your cholesterol?   Yes- Atorvastatin 10 mg but admits to taking less than full dose    Are you having muscle aches or other side effects that you think could be caused by your cholesterol lowering medication?  No    Hypertension Follow-up      Do you check your blood pressure regularly outside of the clinic? No     Are you following a low salt diet? No    Are your blood pressures ever more than 140 on the top number (systolic) OR more   than 90 on the bottom number (diastolic), for example 140/90? No    Depression and Anxiety Follow-Up    How are you doing with your depression since your last visit? Worsened having more stressors due to work    How are you doing with your anxiety since your last visit?  Worsened more stressors    Are you having other symptoms that might be associated with depression or anxiety? Yes:  Insomnia, drinking more    Have you had a significant life event? Job Concerns, Financial Concerns, Housing Concerns and OTHER: Caregiver stressors     Do you have any concerns with your use of alcohol or other drugs? Yes:  Using alcohol more often than should    Social History     Tobacco Use     Smoking status: Current Some Day Smoker     Packs/day: 0.50     Types: Cigarettes     Last attempt to quit:  4/2/2019     Years since quitting: 3.3     Smokeless tobacco: Never Used     Tobacco comment: 5-6 cigs a day   Substance Use Topics     Alcohol use: Yes     Comment: Alcoholic Drinks/day: 1 or 2 times a year     Drug use: Never     PHQ 5/3/2021 11/30/2021 8/4/2022   PHQ-9 Total Score 16 10 15   Q9: Thoughts of better off dead/self-harm past 2 weeks Several days Not at all Not at all     SILVIA-7 SCORE 5/3/2021 11/30/2021 8/4/2022   Total Score - 18 (severe anxiety) 13 (moderate anxiety)   Total Score 10 18 13     Last PHQ-9 8/4/2022   1.  Little interest or pleasure in doing things 1   2.  Feeling down, depressed, or hopeless 2   3.  Trouble falling or staying asleep, or sleeping too much 3   4.  Feeling tired or having little energy 2   5.  Poor appetite or overeating 1   6.  Feeling bad about yourself 1   7.  Trouble concentrating 3   8.  Moving slowly or restless 2   Q9: Thoughts of better off dead/self-harm past 2 weeks 0   PHQ-9 Total Score 15   Difficulty at work, home, or with people -     SILVIA-7  8/4/2022   1. Feeling nervous, anxious, or on edge 2   2. Not being able to stop or control worrying 1   3. Worrying too much about different things 2   4. Trouble relaxing 2   5. Being so restless that it is hard to sit still 2   6. Becoming easily annoyed or irritable 3   7. Feeling afraid, as if something awful might happen 1   SILVIA-7 Total Score 13   If you checked any problems, how difficult have they made it for you to do your work, take care of things at home, or get along with other people? Somewhat difficult         Objective    /64 (BP Location: Right arm, Patient Position: Sitting, Cuff Size: Adult Regular)   Pulse 52   Resp 12   Wt 71.4 kg (157 lb 6.4 oz)   SpO2 95%   BMI 24.65 kg/m    Body mass index is 24.65 kg/m .  Physical Exam   GENERAL: healthy, alert and some emotional distress  RESP: lungs clear to auscultation - no rales, rhonchi or wheezes  CV: regular rates and rhythm, no murmur, click  or rub and no peripheral edema  ABDOMEN: soft, nontender  MS: no gross musculoskeletal defects noted, no edema  PSYCH: mentation appears normal, affect flat, anxious, fatigued, judgement and insight intact and appearance well groomed                .  ..

## 2022-08-11 ENCOUNTER — LAB (OUTPATIENT)
Dept: LAB | Facility: CLINIC | Age: 57
End: 2022-08-11
Payer: COMMERCIAL

## 2022-08-11 ENCOUNTER — TELEPHONE (OUTPATIENT)
Dept: FAMILY MEDICINE | Facility: CLINIC | Age: 57
End: 2022-08-11

## 2022-08-11 DIAGNOSIS — I10 BENIGN HYPERTENSION: Chronic | ICD-10-CM

## 2022-08-11 DIAGNOSIS — E78.5 HYPERLIPIDEMIA, UNSPECIFIED HYPERLIPIDEMIA TYPE: Chronic | ICD-10-CM

## 2022-08-11 LAB
ANION GAP SERPL CALCULATED.3IONS-SCNC: 10 MMOL/L (ref 7–15)
BUN SERPL-MCNC: 9.9 MG/DL (ref 6–20)
CALCIUM SERPL-MCNC: 9.4 MG/DL (ref 8.6–10)
CHLORIDE SERPL-SCNC: 103 MMOL/L (ref 98–107)
CHOLEST SERPL-MCNC: 257 MG/DL
CREAT SERPL-MCNC: 0.92 MG/DL (ref 0.67–1.17)
DEPRECATED HCO3 PLAS-SCNC: 24 MMOL/L (ref 22–29)
GFR SERPL CREATININE-BSD FRML MDRD: >90 ML/MIN/1.73M2
GLUCOSE SERPL-MCNC: 111 MG/DL (ref 70–99)
HDLC SERPL-MCNC: 43 MG/DL
LDLC SERPL CALC-MCNC: 183 MG/DL
NONHDLC SERPL-MCNC: 214 MG/DL
POTASSIUM SERPL-SCNC: 4.1 MMOL/L (ref 3.4–5.3)
SODIUM SERPL-SCNC: 137 MMOL/L (ref 136–145)
TRIGL SERPL-MCNC: 153 MG/DL

## 2022-08-11 PROCEDURE — 80061 LIPID PANEL: CPT

## 2022-08-11 PROCEDURE — 36415 COLL VENOUS BLD VENIPUNCTURE: CPT

## 2022-08-11 PROCEDURE — 80048 BASIC METABOLIC PNL TOTAL CA: CPT

## 2022-08-11 NOTE — LETTER
Re: Elvin Trivedi         1965        [address] [place] [address]          To whom it may concern,      The above mentioned patient has been my patient for many years.  He is experiencing insomnia.  He has failed the use of trazodone and melatonin and has had side effects from other over-the-counter medications used for insomnia.  I would think it would be unwise to give this patient controlled substances such as Lunesta, Sonata or zolpidem because of his past medical history.  Therefore, I think it makes sense that you authorize his use of Rozerem.      Thank you so much for your consideration,            Ros Griffith MD

## 2022-08-11 NOTE — TELEPHONE ENCOUNTER
Central Prior Authorization Team  Phone: [phone]    PA Initiation    Medication: ramelteon (ROZEREM) 8 MG tablet  Insurance Company: [employer] - Phone [phone] Fax [phone]  Pharmacy Filling the Rx: [id number] [place], [place] - [address] AT [facility] [address]  Filling Pharmacy Phone: [phone]  Filling Pharmacy Fax:    Start Date: 8/11/2022

## 2022-08-11 NOTE — TELEPHONE ENCOUNTER
Pt still has not gotten medication  ramelteon (ROZEREM)    ordered on 8/4/2022  due to prior auth needed. Please follow up and help move this forward.    Please call pt and let him know what to do while waiting for medication  or update on how long process could take.    Ok to leave detailed message     Phone: [phone]

## 2022-08-11 NOTE — LETTER
Re: Elvin Trivedi         1965        [address] [place] [address]          To whom it may concern,      The above mentioned patient has been my patient for many years.  He is experiencing insomnia.  He has failed the use of trazodone and melatonin and has had side effects from other over-the-counter medications used for insomnia.  I would think it would be unwise to give this patient controlled substances such as Lunesta, Sonata or zolpidem because of his past medical history.  Therefore, I think it makes sense that you authorize his use of Rozerem.      Thank you so much for your consideration,            Ros Griffith MD

## 2022-08-13 NOTE — TELEPHONE ENCOUNTER
Please see telephone encounter dated: 01/22/2019.  PA was already completed for this medication under this insurance and was denied.  Patient needs to try and fail pantoprazole.    decreased ability to use arms for pushing/pulling

## 2022-08-15 NOTE — TELEPHONE ENCOUNTER
Per call to insurance, pa is currently under review and should have a determination by the end of the day today 8/15.

## 2022-08-16 NOTE — TELEPHONE ENCOUNTER
Pt has tried and failed Trazadone medication. This gives him headaches. He would like letter of medical necessity.

## 2022-08-16 NOTE — TELEPHONE ENCOUNTER
PRIOR AUTHORIZATION DENIED    Medication: ramelteon (ROZEREM) 8 MG tablet- DENIED     Denial Date: 8/15/2022    Denial Rational: Patient must have a history of trial & failure to all formulary alternatives or have a contraindication or intolerance to the formulary alternatives:  - Zolpidem, Trazodone, zaleplon, and eszopiclone    Appeal Information: If provider would like to appeal please provide a letter of medical necessity.

## 2022-08-16 NOTE — TELEPHONE ENCOUNTER
Call and ask patient if he wants to go ahead with this paperwork. Has he failed trazodone?  (I don't want him to be on Lunesta, Sonata or Ambien.)

## 2022-08-16 NOTE — TELEPHONE ENCOUNTER
Letter has been done.  Can we get the signed version faxed to wherever it needs to be?  Or if the version without my signature is okay then do what ever needs to be done please.

## 2022-08-17 NOTE — TELEPHONE ENCOUNTER
Medication Appeal Initiation    We have initiated an appeal for the requested medication:  Medication: ramelteon (ROZEREM) 8 MG tablet- APPEAL INITIATED   Appeal Start Date:  8/17/2022  Insurance Company: [employer] - Phone [phone] Fax [phone]  Comments:  Faxed appeal letter to insurance. Fax# [phone]

## 2022-08-21 DIAGNOSIS — F33.41 RECURRENT MAJOR DEPRESSIVE DISORDER, IN PARTIAL REMISSION (H): Chronic | ICD-10-CM

## 2022-08-21 DIAGNOSIS — F41.1 GENERALIZED ANXIETY DISORDER: Chronic | ICD-10-CM

## 2022-08-21 RX ORDER — FLUOXETINE 40 MG/1
CAPSULE ORAL
Qty: 30 CAPSULE | OUTPATIENT
Start: 2022-08-21

## 2022-08-22 NOTE — TELEPHONE ENCOUNTER
Per call to [employer], there is currently no appeal on file for this medication. Re-faxed appeal information to insurance.

## 2022-08-22 NOTE — TELEPHONE ENCOUNTER
MEDICATION APPEAL APPROVED    Medication: ramelteon (ROZEREM) 8 MG tablet- APPEAL APPROVED   Authorization Effective Date: 8/17/2022  Authorization Expiration Date: 8/17/2023  Approved Dose/Quantity:   Reference #:     Insurance Company: [employer] - Phone [phone] Fax [phone]  Expected CoPay:       CoPay Card Available:      Foundation Assistance Needed:    Which Pharmacy is filling the prescription (Not needed for infusion/clinic administered): [id number] [address] [facility] [address]  ** left a message for the pharmacy**  ** informed pt of medication appeal approval**

## 2022-09-03 ENCOUNTER — HEALTH MAINTENANCE LETTER (OUTPATIENT)
Age: 57
End: 2022-09-03

## 2022-10-30 DIAGNOSIS — I10 BENIGN HYPERTENSION: ICD-10-CM

## 2022-10-31 RX ORDER — AMLODIPINE BESYLATE 5 MG/1
TABLET ORAL
Qty: 90 TABLET | Refills: 3 | Status: SHIPPED | OUTPATIENT
Start: 2022-10-31 | End: 2023-10-23

## 2022-10-31 NOTE — TELEPHONE ENCOUNTER
"Last Written Prescription Date:  1/30/22  Last Fill Quantity: 90,  # refills: 2   Last office visit provider:  8/4/22    Requested Prescriptions   Pending Prescriptions Disp Refills     amLODIPine (NORVASC) 5 MG tablet [Pharmacy Med Name: AMLODIPINE BESYLATE 5MG TABLETS] 90 tablet 2     Sig: TAKE 1 TABLET(5 MG) BY MOUTH DAILY       Calcium Channel Blockers Protocol  Passed - 10/30/2022  3:27 AM        Passed - Blood pressure under 140/90 in past 12 months     BP Readings from Last 3 Encounters:   08/04/22 126/64   11/30/21 128/72   05/03/21 126/74                 Passed - Recent (12 mo) or future (30 days) visit within the authorizing provider's specialty     Patient has had an office visit with the authorizing provider or a provider within the authorizing providers department within the previous 12 mos or has a future within next 30 days. See \"Patient Info\" tab in inbasket, or \"Choose Columns\" in Meds & Orders section of the refill encounter.              Passed - Medication is active on med list        Passed - Patient is age 18 or older        Passed - Normal serum creatinine on file in past 12 months     Recent Labs   Lab Test 08/11/22  0703   CR 0.92       Ok to refill medication if creatinine is low               Tasha Wang RN 10/31/22 1:02 PM  "

## 2022-11-13 DIAGNOSIS — E78.5 HYPERLIPIDEMIA, UNSPECIFIED HYPERLIPIDEMIA TYPE: ICD-10-CM

## 2022-11-14 RX ORDER — ATORVASTATIN CALCIUM 10 MG/1
TABLET, FILM COATED ORAL
Qty: 90 TABLET | Refills: 1 | OUTPATIENT
Start: 2022-11-14

## 2023-01-05 ENCOUNTER — OFFICE VISIT (OUTPATIENT)
Dept: FAMILY MEDICINE | Facility: CLINIC | Age: 58
End: 2023-01-05
Payer: COMMERCIAL

## 2023-01-05 VITALS
WEIGHT: 166.6 LBS | OXYGEN SATURATION: 97 % | HEIGHT: 68 IN | TEMPERATURE: 98.2 F | HEART RATE: 67 BPM | RESPIRATION RATE: 14 BRPM | BODY MASS INDEX: 25.25 KG/M2 | DIASTOLIC BLOOD PRESSURE: 84 MMHG | SYSTOLIC BLOOD PRESSURE: 130 MMHG

## 2023-01-05 DIAGNOSIS — Z00.00 ROUTINE GENERAL MEDICAL EXAMINATION AT A HEALTH CARE FACILITY: Primary | ICD-10-CM

## 2023-01-05 DIAGNOSIS — Z23 IMMUNIZATION DUE: ICD-10-CM

## 2023-01-05 DIAGNOSIS — E78.2 MIXED HYPERLIPIDEMIA: Chronic | ICD-10-CM

## 2023-01-05 DIAGNOSIS — I10 BENIGN HYPERTENSION: Chronic | ICD-10-CM

## 2023-01-05 DIAGNOSIS — F33.41 RECURRENT MAJOR DEPRESSIVE DISORDER, IN PARTIAL REMISSION (H): Chronic | ICD-10-CM

## 2023-01-05 DIAGNOSIS — F51.04 PSYCHOPHYSIOLOGICAL INSOMNIA: Chronic | ICD-10-CM

## 2023-01-05 DIAGNOSIS — K21.9 GERD WITHOUT ESOPHAGITIS: Chronic | ICD-10-CM

## 2023-01-05 DIAGNOSIS — F41.1 GENERALIZED ANXIETY DISORDER: Chronic | ICD-10-CM

## 2023-01-05 DIAGNOSIS — R73.9 HYPERGLYCEMIA: ICD-10-CM

## 2023-01-05 DIAGNOSIS — Z12.5 SCREENING FOR PROSTATE CANCER: ICD-10-CM

## 2023-01-05 DIAGNOSIS — Z79.899 CONTROLLED SUBSTANCE AGREEMENT SIGNED: Chronic | ICD-10-CM

## 2023-01-05 LAB
ALBUMIN SERPL BCG-MCNC: 4.4 G/DL (ref 3.5–5.2)
ALP SERPL-CCNC: 67 U/L (ref 40–129)
ALT SERPL W P-5'-P-CCNC: 38 U/L (ref 10–50)
ANION GAP SERPL CALCULATED.3IONS-SCNC: 13 MMOL/L (ref 7–15)
AST SERPL W P-5'-P-CCNC: 29 U/L (ref 10–50)
BILIRUB SERPL-MCNC: 0.4 MG/DL
BUN SERPL-MCNC: 10.8 MG/DL (ref 6–20)
CALCIUM SERPL-MCNC: 9 MG/DL (ref 8.6–10)
CHLORIDE SERPL-SCNC: 106 MMOL/L (ref 98–107)
CHOLEST SERPL-MCNC: 179 MG/DL
CREAT SERPL-MCNC: 0.95 MG/DL (ref 0.67–1.17)
DEPRECATED HCO3 PLAS-SCNC: 20 MMOL/L (ref 22–29)
GFR SERPL CREATININE-BSD FRML MDRD: >90 ML/MIN/1.73M2
GLUCOSE SERPL-MCNC: 111 MG/DL (ref 70–99)
HBA1C MFR BLD: 6 % (ref 0–5.6)
HDLC SERPL-MCNC: 47 MG/DL
LDLC SERPL CALC-MCNC: 115 MG/DL
NONHDLC SERPL-MCNC: 132 MG/DL
POTASSIUM SERPL-SCNC: 4.2 MMOL/L (ref 3.4–5.3)
PROT SERPL-MCNC: 7.1 G/DL (ref 6.4–8.3)
PSA SERPL-MCNC: 1.32 NG/ML (ref 0–3.5)
SODIUM SERPL-SCNC: 139 MMOL/L (ref 136–145)
TRIGL SERPL-MCNC: 84 MG/DL

## 2023-01-05 PROCEDURE — 36415 COLL VENOUS BLD VENIPUNCTURE: CPT | Performed by: FAMILY MEDICINE

## 2023-01-05 PROCEDURE — 90472 IMMUNIZATION ADMIN EACH ADD: CPT | Performed by: FAMILY MEDICINE

## 2023-01-05 PROCEDURE — 83036 HEMOGLOBIN GLYCOSYLATED A1C: CPT | Performed by: FAMILY MEDICINE

## 2023-01-05 PROCEDURE — 90471 IMMUNIZATION ADMIN: CPT | Performed by: FAMILY MEDICINE

## 2023-01-05 PROCEDURE — 80053 COMPREHEN METABOLIC PANEL: CPT | Performed by: FAMILY MEDICINE

## 2023-01-05 PROCEDURE — 90734 MENACWYD/MENACWYCRM VACC IM: CPT | Performed by: FAMILY MEDICINE

## 2023-01-05 PROCEDURE — 80061 LIPID PANEL: CPT | Performed by: FAMILY MEDICINE

## 2023-01-05 PROCEDURE — 90682 RIV4 VACC RECOMBINANT DNA IM: CPT | Performed by: FAMILY MEDICINE

## 2023-01-05 PROCEDURE — G0103 PSA SCREENING: HCPCS | Performed by: FAMILY MEDICINE

## 2023-01-05 PROCEDURE — 99396 PREV VISIT EST AGE 40-64: CPT | Mod: 25 | Performed by: FAMILY MEDICINE

## 2023-01-05 PROCEDURE — 99214 OFFICE O/P EST MOD 30 MIN: CPT | Mod: 25 | Performed by: FAMILY MEDICINE

## 2023-01-05 RX ORDER — DOXEPIN HYDROCHLORIDE 150 MG/1
150 CAPSULE ORAL AT BEDTIME
Qty: 30 CAPSULE | Refills: 1 | Status: SHIPPED | OUTPATIENT
Start: 2023-01-05 | End: 2023-03-02

## 2023-01-05 RX ORDER — LORAZEPAM 1 MG/1
1 TABLET ORAL EVERY 8 HOURS PRN
Qty: 20 TABLET | Refills: 0 | Status: SHIPPED | OUTPATIENT
Start: 2023-01-05 | End: 2023-03-01

## 2023-01-05 ASSESSMENT — ANXIETY QUESTIONNAIRES
3. WORRYING TOO MUCH ABOUT DIFFERENT THINGS: NEARLY EVERY DAY
IF YOU CHECKED OFF ANY PROBLEMS ON THIS QUESTIONNAIRE, HOW DIFFICULT HAVE THESE PROBLEMS MADE IT FOR YOU TO DO YOUR WORK, TAKE CARE OF THINGS AT HOME, OR GET ALONG WITH OTHER PEOPLE: SOMEWHAT DIFFICULT
1. FEELING NERVOUS, ANXIOUS, OR ON EDGE: MORE THAN HALF THE DAYS
6. BECOMING EASILY ANNOYED OR IRRITABLE: MORE THAN HALF THE DAYS
7. FEELING AFRAID AS IF SOMETHING AWFUL MIGHT HAPPEN: SEVERAL DAYS
GAD7 TOTAL SCORE: 15
GAD7 TOTAL SCORE: 15
4. TROUBLE RELAXING: NEARLY EVERY DAY
8. IF YOU CHECKED OFF ANY PROBLEMS, HOW DIFFICULT HAVE THESE MADE IT FOR YOU TO DO YOUR WORK, TAKE CARE OF THINGS AT HOME, OR GET ALONG WITH OTHER PEOPLE?: SOMEWHAT DIFFICULT
5. BEING SO RESTLESS THAT IT IS HARD TO SIT STILL: MORE THAN HALF THE DAYS
GAD7 TOTAL SCORE: 15
2. NOT BEING ABLE TO STOP OR CONTROL WORRYING: MORE THAN HALF THE DAYS
7. FEELING AFRAID AS IF SOMETHING AWFUL MIGHT HAPPEN: SEVERAL DAYS

## 2023-01-05 ASSESSMENT — ENCOUNTER SYMPTOMS
HEADACHES: 0
SHORTNESS OF BREATH: 0
JOINT SWELLING: 0
HEARTBURN: 1
HEMATURIA: 0
DIZZINESS: 0
HEMATOCHEZIA: 0
DYSURIA: 0
SORE THROAT: 0
DIARRHEA: 0
COUGH: 0
CONSTIPATION: 0
ABDOMINAL PAIN: 0
CHILLS: 0
EYE PAIN: 0
ARTHRALGIAS: 0
WEAKNESS: 0
FEVER: 0
MYALGIAS: 0
PALPITATIONS: 0
NAUSEA: 0
NERVOUS/ANXIOUS: 1
FREQUENCY: 0
PARESTHESIAS: 0

## 2023-01-05 ASSESSMENT — PAIN SCALES - GENERAL: PAINLEVEL: NO PAIN (0)

## 2023-01-05 ASSESSMENT — PATIENT HEALTH QUESTIONNAIRE - PHQ9
10. IF YOU CHECKED OFF ANY PROBLEMS, HOW DIFFICULT HAVE THESE PROBLEMS MADE IT FOR YOU TO DO YOUR WORK, TAKE CARE OF THINGS AT HOME, OR GET ALONG WITH OTHER PEOPLE: SOMEWHAT DIFFICULT
SUM OF ALL RESPONSES TO PHQ QUESTIONS 1-9: 11
SUM OF ALL RESPONSES TO PHQ QUESTIONS 1-9: 11

## 2023-01-05 NOTE — LETTER
[facility]  01/02/23  Patient: Elvin Trivedi  YOB: 1965  Medical Record Number: [id number]                                                                                  Non-Opioid Controlled Substance Agreement    This is an agreement between you and your provider regarding safe and appropriate use of controlled substances prescribed by your care team. Controlled substances are?medicines that can cause physical and mental dependence (abuse).     There are strict laws about having and using these medicines. We here at [facility] are  committed to working with you in your efforts to get better. To support you in this work, we'll help you schedule regular office appointments for medicine refills. If we must cancel or change your appointment for any reason, we'll make sure you have enough medicine to last until your next appointment.     As a Provider, I will:     Listen carefully to your concerns while treating you with respect.     Recommend a treatment plan that I believe is in your best interest and may involve therapies other than medicine.      Talk with you often about the possible benefits and the risk of harm of any medicine that we prescribe for you.    Assess the safety of this medicine and check how well it works.      Provide a plan on how to taper (discontinue or go off) using this medicine if the decision is made to stop its use.      ::  As a Patient, I understand controlled substances:       Are prescribed by my care provider to help me function or work and manage my condition(s).?    Are strong medicines and can cause serious side effects.       Need to be taken exactly as prescribed.?Combining controlled substances with certain medicines or chemicals (such as illegal drugs, alcohol, sedatives, sleeping pills, and benzodiazepines) can be dangerous or even fatal.? If I stop taking my medicines suddenly, I may have severe withdrawal symptoms.     The  risks, benefits, and side effects of these medicine(s) were explained to me. I agree that:    1. I will take part in other treatments as advised by my care team. This may be psychiatry or counseling, physical therapy, behavioral therapy, group treatment or a referral to specialist.    2. I will keep all my appointments and understand this is part of the monitoring of controlled substances.?My care team may require an office visit for EVERY controlled substance refill. If I miss appointments or don t follow instructions, my care team may stop my medicine    3. I will take my medicines as prescribed. I will not change the dose or schedule unless my care team tells me to. There will be no refills if I run out early.      4. I may be asked to come to the clinic and complete a urine drug test or complete a pill count. If I don t give a urine sample or participate in a pill count, the care team may stop my medicine.    5. I will only receive controlled substance prescriptions from this clinic. If I am treated by another provider, I will tell them that I am taking controlled substances and that I have a treatment agreement with this provider. I will inform my [facility] care team within one business day if I am given a prescription for any controlled substance by another healthcare provider. My [facility] care team can contact other providers and pharmacists about my use of any medicines.    6. It is up to me to make sure that I don't run out of my medicines on weekends or holidays.?If my care team is willing to refill my prescription without a visit, I must request refills only during office hours. Refills may take up to 3 business days to process. I will use one pharmacy to fill all my controlled substance prescriptions. I will notify the clinic about any changes to my insurance or medicine availability.    7. I am responsible for my prescriptions. If the medicine/prescription is lost, stolen or destroyed,  it will not be replaced.?I also agree not to share controlled substance medicines with anyone.     8. I am aware I should not use any illegal or recreational drugs. I agree not to drink alcohol unless my care team says I can.     9. If I enroll in the [place] Medical Cannabis program, I will tell my care team before my next refill.    10. I will tell my care team right away if I become pregnant, have a new medical problem treated outside of my regular clinic, or have a change in my medicines.     11. I understand that this medicine can affect my thinking, judgment and reaction time.? Alcohol and drugs affect the brain and body, which can affect the safety of my driving. Being under the influence of alcohol or drugs can affect my decision-making, behaviors, personal safety and the safety of others. Driving while impaired (DWI) can occur if a person is driving, operating or in physical control of a car, motorcycle, boat, snowmobile, ATV, motorbike, off-road vehicle or any other motor vehicle ([place] Statute 169[place].20). I understand the risk if I choose to drive or operate any vehicle or machinery.    I understand that if I do not follow any of the conditions above, my prescriptions or treatment may be stopped or changed.   I agree that my provider, clinic care team and pharmacy may work with any city, state or federal law enforcement agency that investigates the misuse, sale or other diversion of my controlled medicine. I will allow my provider to discuss my care with, or share a copy of, this agreement with any other treating provider, pharmacy or emergency room where I receive care.     I have read this agreement and have asked questions about anything I did not understand.    ________________________________________________________  Patient Signature - Elvin A Blomster     ___________________                   Date     ________________________________________________________  Provider Signature - Ros Griffith MD        ___________________                   Date     ________________________________________________________  Witness Signature (required if provider not present while patient signing)          ___________________                   Date

## 2023-01-05 NOTE — PROGRESS NOTES
SUBJECTIVE:   CC: Elvin is an 57 year old who presents for preventative health visit.   Patient has been advised of split billing requirements and indicates understanding: Yes  Healthy Habits:     Getting at least 3 servings of Calcium per day:  Yes    Bi-annual eye exam:  Yes    Dental care twice a year:  Yes    Sleep apnea or symptoms of sleep apnea:  Sleep apnea    Diet:  Low salt    Frequency of exercise:  1 day/week    Duration of exercise:  Less than 15 minutes    Taking medications regularly:  Yes    Medication side effects:  None    PHQ-2 Total Score: 2    Additional concerns today:  No      Hyperlipidemia Follow-Up      Are you regularly taking any medication or supplement to lower your cholesterol?   Yes- lipitor 10 mg    Are you having muscle aches or other side effects that you think could be caused by your cholesterol lowering medication?  No    Hypertension Follow-up      Do you check your blood pressure regularly outside of the clinic? No     Are you following a low salt diet? Yes    Are your blood pressures ever more than 140 on the top number (systolic) OR more   than 90 on the bottom number (diastolic), for example 140/90? No    Depression and Anxiety Follow-Up    How are you doing with your depression since your last visit? No change    How are you doing with your anxiety since your last visit?  Worsened job/money    Are you having other symptoms that might be associated with depression or anxiety? Yes:  Sleep    Have you had a significant life event? Job Concerns, Financial Concerns, Housing Concerns and Transportation Concerns     Do you have any concerns with your use of alcohol or other drugs? No    Social History     Tobacco Use     Smoking status: Former     Packs/day: 0.50     Types: Cigarettes     Quit date: 8/10/2022     Years since quittin.4     Passive exposure: Current     Smokeless tobacco: Never     Tobacco comments:     Wife still smokes.   Vaping Use     Vaping Use: Never used    Substance Use Topics     Alcohol use: Yes     Comment: Alcoholic Drinks/day: 1 or 2 times a year     Drug use: Never     PHQ 11/30/2021 8/4/2022 1/5/2023   PHQ-9 Total Score 10 15 11   Q9: Thoughts of better off dead/self-harm past 2 weeks Not at all Not at all Not at all     SILVIA-7 SCORE 11/30/2021 8/4/2022 1/5/2023   Total Score 18 (severe anxiety) 13 (moderate anxiety) 15 (severe anxiety)   Total Score 18 13 15     Last PHQ-9 1/5/2023   1.  Little interest or pleasure in doing things 2   2.  Feeling down, depressed, or hopeless 1   3.  Trouble falling or staying asleep, or sleeping too much 3   4.  Feeling tired or having little energy 2   5.  Poor appetite or overeating 0   6.  Feeling bad about yourself 1   7.  Trouble concentrating 1   8.  Moving slowly or restless 1   Q9: Thoughts of better off dead/self-harm past 2 weeks 0   PHQ-9 Total Score 11   Difficulty at work, home, or with people -     SILVIA-7  1/5/2023   1. Feeling nervous, anxious, or on edge 2   2. Not being able to stop or control worrying 2   3. Worrying too much about different things 3   4. Trouble relaxing 3   5. Being so restless that it is hard to sit still 2   6. Becoming easily annoyed or irritable 2   7. Feeling afraid, as if something awful might happen 1   SILVIA-7 Total Score 15   If you checked any problems, how difficult have they made it for you to do your work, take care of things at home, or get along with other people? Somewhat difficult       Suicide Assessment Five-step Evaluation and Treatment (SAFE-T)      Today's PHQ-2 Score:   PHQ-2 ( 1999 [employer]) 1/5/2023   Q1: Little interest or pleasure in doing things 1   Q2: Feeling down, depressed or hopeless 1   PHQ-2 Score 2   Q1: Little interest or pleasure in doing things Several days   Q2: Feeling down, depressed or hopeless Several days   PHQ-2 Score 2       Have you ever done Advance Care Planning? (For example, a Health Directive, POLST, or a discussion with a medical provider or  your loved ones about your wishes): No, advance care planning information given to patient to review.  Patient declined advance care planning discussion at this time.    Social History     Tobacco Use     Smoking status: Former     Packs/day: 0.50     Types: Cigarettes     Quit date: 8/10/2022     Years since quittin.4     Passive exposure: Current     Smokeless tobacco: Never     Tobacco comments:     Wife still smokes.   Substance Use Topics     Alcohol use: Yes     Comment: Alcoholic Drinks/day: 1 or 2 times a year     If you drink alcohol do you typically have >3 drinks per day or >7 drinks per week? No    Alcohol Use 2023   Prescreen: >3 drinks/day or >7 drinks/week? Not Applicable   Prescreen: >3 drinks/day or >7 drinks/week? -       Last PSA:   Prostate Specific Antigen Screen   Date Value Ref Range Status   2023 1.32 0.00 - 3.50 ng/mL Final   2021 1.45 0.00 - 3.50 ug/L Final       Reviewed orders with patient. Reviewed health maintenance and updated orders accordingly - Yes  Labs reviewed in EPIC  BP Readings from Last 3 Encounters:   23 130/84   22 126/64   21 128/72    Wt Readings from Last 3 Encounters:   23 75.6 kg (166 lb 9.6 oz)   22 71.4 kg (157 lb 6.4 oz)   21 69.4 kg (153 lb)                    Reviewed and updated as needed this visit by clinical staff   Tobacco  Allergies  Meds  Problems  Med Hx  Surg Hx  Fam Hx          Reviewed and updated as needed this visit by Provider   Tobacco  Allergies  Meds  Problems  Med Hx  Surg Hx  Fam Hx         Past Medical History:   Diagnosis Date     Ruptured appendix      Ruptured spleen       Past Surgical History:   Procedure Laterality Date     APPENDECTOMY      Ruptured     SPLENECTOMY, TOTAL      Fell while scaling a railing and ruptured spleen.     SURGICAL HISTORY OF -       spleen and appy     WISDOM TOOTH EXTRACTION       OB History   No obstetric history on file.       Review  "of Systems   Constitutional: Negative for chills and fever.   HENT: Negative for congestion, ear pain, hearing loss and sore throat.    Eyes: Negative for pain and visual disturbance.   Respiratory: Negative for cough and shortness of breath.    Cardiovascular: Negative for chest pain, palpitations and peripheral edema.   Gastrointestinal: Positive for heartburn. Negative for abdominal pain, constipation, diarrhea, hematochezia and nausea.   Genitourinary: Positive for impotence. Negative for dysuria, frequency, genital sores, hematuria, penile discharge and urgency.   Musculoskeletal: Negative for arthralgias, joint swelling and myalgias.   Skin: Negative for rash.   Neurological: Negative for dizziness, weakness, headaches and paresthesias.   Psychiatric/Behavioral: Negative for mood changes. The patient is nervous/anxious.        OBJECTIVE:   /84   Pulse 67   Temp 98.2  F (36.8  C) (Oral)   Resp 14   Ht 1.715 m (5' 7.5\")   Wt 75.6 kg (166 lb 9.6 oz)   SpO2 97%   BMI 25.71 kg/m      Physical Exam  General appearance - alert, well appearing, and in no distress, normal appearing weight and anxious appearing  Mental status - normal behavior, speech, dress, motor activity, and thought processes, anxious, agitated  Eyes - pupils equal and reactive, extraocular eye movements intact  Ears - bilateral TM's and external ear canals normal  Nose - normal and patent, no erythema, discharge   Mouth - not examined and Covered by mask  Neck - supple, no significant adenopathy, carotids upstroke normal bilaterally, no bruits, thyroid exam: thyroid is normal in size without nodules or tenderness  Chest - clear to auscultation, no wheezes, rales or rhonchi, symmetric air entry  Heart - normal rate and regular rhythm, no murmurs noted  Abdomen - soft, nontender, nondistended, no masses or organomegaly  Back exam - full range of motion, no tenderness, palpable spasm or pain on motion  Neurological - alert, oriented, " normal speech, no focal findings or movement disorder noted, cranial nerves II through XII intact, DTR's normal and symmetric  Musculoskeletal - no joint tenderness, deformity or swelling  Extremities - peripheral pulses normal, no pedal edema, no clubbing or cyanosis  Skin - normal coloration and turgor, no rashes, no suspicious skin lesions noted      Labs reviewed in Epic  Results for orders placed or performed in visit on 01/05/23   Hemoglobin A1c     Status: Abnormal   Result Value Ref Range    Hemoglobin A1C 6.0 (H) 0.0 - 5.6 %   Comprehensive metabolic panel (BMP + Alb, Alk Phos, ALT, AST, Total. Bili, TP)     Status: Abnormal   Result Value Ref Range    Sodium 139 136 - 145 mmol/L    Potassium 4.2 3.4 - 5.3 mmol/L    Chloride 106 98 - 107 mmol/L    Carbon Dioxide (CO2) 20 (L) 22 - 29 mmol/L    Anion Gap 13 7 - 15 mmol/L    Urea Nitrogen 10.8 6.0 - 20.0 mg/dL    Creatinine 0.95 0.67 - 1.17 mg/dL    Calcium 9.0 8.6 - 10.0 mg/dL    Glucose 111 (H) 70 - 99 mg/dL    Alkaline Phosphatase 67 40 - 129 U/L    AST 29 10 - 50 U/L    ALT 38 10 - 50 U/L    Protein Total 7.1 6.4 - 8.3 g/dL    Albumin 4.4 3.5 - 5.2 g/dL    Bilirubin Total 0.4 <=1.2 mg/dL    GFR Estimate >90 >60 mL/min/1.73m2   PSA, screen     Status: Normal   Result Value Ref Range    Prostate Specific Antigen Screen 1.32 0.00 - 3.50 ng/mL    Narrative    This result is obtained using the Roche Elecsys total PSA method on the mainor e801 immunoassay analyzer. Results obtained with different assay methods or kits cannot be used interchangeably.   Lipid panel reflex to direct LDL Fasting     Status: Abnormal   Result Value Ref Range    Cholesterol 179 <200 mg/dL    Triglycerides 84 <150 mg/dL    Direct Measure HDL 47 >=40 mg/dL    LDL Cholesterol Calculated 115 (H) <=100 mg/dL    Non HDL Cholesterol 132 (H) <130 mg/dL    Narrative    Cholesterol  Desirable:  <200 mg/dL    Triglycerides  Normal:  Less than 150 mg/dL  Borderline High:  150-199 mg/dL  High:   200-499 mg/dL  Very High:  Greater than or equal to 500 mg/dL    Direct Measure HDL  Female:  Greater than or equal to 50 mg/dL   Male:  Greater than or equal to 40 mg/dL    LDL Cholesterol  Desirable:  <100mg/dL  Above Desirable:  100-129 mg/dL   Borderline High:  130-159 mg/dL   High:  160-189 mg/dL   Very High:  >= 190 mg/dL    Non HDL Cholesterol  Desirable:  130 mg/dL  Above Desirable:  130-159 mg/dL  Borderline High:  160-189 mg/dL  High:  190-219 mg/dL  Very High:  Greater than or equal to 220 mg/dL       ASSESSMENT/PLAN:     Routine general medical examination at a health care facility  Patient's last colonoscopy was November 6, 2020 and good for 3 years.  Patient is due for COVID, influenza and Menactra immunization today.    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  SILVIA-7 score is 15 down from from previous 13.  - FLUoxetine (PROZAC) 40 MG capsule  Dispense: 90 capsule; Refill: 1    Recurrent major depressive disorder, in partial remission (H)  PHQ-9 score today is 11 which is actually down from a previous 15.  Will refill medication.  - FLUoxetine (PROZAC) 40 MG capsule  Dispense: 90 capsule; Refill: 1    Psychophysiological insomnia  Sleep is a recurring issue for him.  He does not like trazodone.  He knows he should not take lorazepam all the time.  He failed Rozerem.  Will do a trial of doxepin.  - LORazepam (ATIVAN) 1 MG tablet  Dispense: 20 tablet; Refill: 0  - doxepin HCl (SINEQUAN) 150 MG capsule  Dispense: 30 capsule; Refill: 1    Controlled substance agreement signed  Does have a controlled substance agreement with me for lorazepam 1 mg 20 tablets in 30 days which we renewed today.    Benign hypertension  Controlled today.  Will check labs and refill medications.  - Comprehensive metabolic panel (BMP + Alb, Alk Phos, ALT, AST, Total. Bili, TP)  - Comprehensive metabolic panel (BMP + Alb,  Alk Phos, ALT, AST, Total. Bili, TP)  - losartan (COZAAR) 100 MG tablet  Dispense: 90 tablet; Refill: 1    Mixed hyperlipidemia  Currently on Lipitor 10 mg which gave him a significant improvement dropping his 2021 LDL down to 90.  He went off the medication and in August when we rechecked his LDL was back up to 183 and his total cholesterol to 257.  He tells me he is tolerating his medication.  I will recheck labs and refill medication accordingly.  - Lipid panel reflex to direct LDL Fasting  - Lipid panel reflex to direct LDL Fasting  - atorvastatin (LIPITOR) 20 MG tablet  Dispense: 90 tablet; Refill: 1    GERD without esophagitis  Has trouble with reflux and needs refill of omeprazole.  - omeprazole (PRILOSEC) 40 MG DR capsule  Dispense: 90 capsule; Refill: 2    Hyperglycemia  Labs in August had a glucose of 111.  Will check an A1c.  - Hemoglobin A1c  - Hemoglobin A1c    Screening for prostate cancer  Patient would like prostate screening.  - PSA, screen  - PSA, screen    Immunization due  Adamantly declines COVID vaccination.  Willing to do flu and meningococcal.  - INFLUENZA VACCINE 50-64 OR 18-64 W/EGG ALLERGY (FLUBLOK)  - MENINGOCOCCAL ACWY 9M-55Y (MENACTRA )    [facility] and only call with grossly abnormal values.  If all is well I would like to see him back in 6 months time.    Patient has been advised of split billing requirements and indicates understanding: Yes      COUNSELING:   Reviewed preventive health counseling, as reflected in patient instructions        He reports that he quit smoking about 4 months ago. His smoking use included cigarettes. He smoked an average of .5 packs per day. He has been exposed to tobacco smoke. He has never used smokeless tobacco.  Nicotine/Tobacco Cessation Plan:   Information offered: Patient not interested at this time            Ros Griffith MD  [facility]  Answers for HPI/ROS submitted by  the patient on 1/5/2023  If you checked off any problems, how difficult have these problems made it for you to do your work, take care of things at home, or get along with other people?: Somewhat difficult  PHQ9 TOTAL SCORE: 11  SILVIA 7 TOTAL SCORE: 15

## 2023-01-05 NOTE — LETTER
January 6, 2023      Elvin Trivedi  [address] [id number]        Dear ,    We are writing to inform you of your test results.    By the time you get this letter, you should have already gotten a phone call to go over the results.  These results show you are prediabetic and that you need closer control of your cholesterol.  Prescription was sent for the next higher dose of your cholesterol medicine.  Make sure you follow-up in 6 months.    Resulted Orders   Hemoglobin A1c   Result Value Ref Range    Hemoglobin A1C 6.0 (H) 0.0 - 5.6 %      Comment:      Normal <5.7%   Prediabetes 5.7-6.4%    Diabetes 6.5% or higher     Note: Adopted from ADA consensus guidelines.   Comprehensive metabolic panel (BMP + Alb, Alk Phos, ALT, AST, Total. Bili, TP)   Result Value Ref Range    Sodium 139 136 - 145 mmol/L    Potassium 4.2 3.4 - 5.3 mmol/L    Chloride 106 98 - 107 mmol/L    Carbon Dioxide (CO2) 20 (L) 22 - 29 mmol/L    Anion Gap 13 7 - 15 mmol/L    Urea Nitrogen 10.8 6.0 - 20.0 mg/dL    Creatinine 0.95 0.67 - 1.17 mg/dL    Calcium 9.0 8.6 - 10.0 mg/dL    Glucose 111 (H) 70 - 99 mg/dL    Alkaline Phosphatase 67 40 - 129 U/L    AST 29 10 - 50 U/L    ALT 38 10 - 50 U/L    Protein Total 7.1 6.4 - 8.3 g/dL    Albumin 4.4 3.5 - 5.2 g/dL    Bilirubin Total 0.4 <=1.2 mg/dL    GFR Estimate >90 >60 mL/min/1.73m2      Comment:      Effective December 21, 2021 eGFRcr in adults is calculated using the 2021 CKD-EPI creatinine equation which includes age and gender (Acacia et al., NEJM, DOI: 10.1056/CMKFct7763948)   PSA, screen   Result Value Ref Range    Prostate Specific Antigen Screen 1.32 0.00 - 3.50 ng/mL    Narrative    This result is obtained using the Roche Elecsys total PSA method on the mainor e801 immunoassay analyzer. Results obtained with different assay methods or kits cannot be used interchangeably.   Lipid panel reflex to direct LDL Fasting   Result Value Ref Range    Cholesterol 179 <200  mg/dL    Triglycerides 84 <150 mg/dL    Direct Measure HDL 47 >=40 mg/dL    LDL Cholesterol Calculated 115 (H) <=100 mg/dL    Non HDL Cholesterol 132 (H) <130 mg/dL    Narrative    Cholesterol  Desirable:  <200 mg/dL    Triglycerides  Normal:  Less than 150 mg/dL  Borderline High:  150-199 mg/dL  High:  200-499 mg/dL  Very High:  Greater than or equal to 500 mg/dL    Direct Measure HDL  Female:  Greater than or equal to 50 mg/dL   Male:  Greater than or equal to 40 mg/dL    LDL Cholesterol  Desirable:  <100mg/dL  Above Desirable:  100-129 mg/dL   Borderline High:  130-159 mg/dL   High:  160-189 mg/dL   Very High:  >= 190 mg/dL    Non HDL Cholesterol  Desirable:  130 mg/dL  Above Desirable:  130-159 mg/dL  Borderline High:  160-189 mg/dL  High:  190-219 mg/dL  Very High:  Greater than or equal to 220 mg/dL       If you have any questions or concerns, please call the clinic at the number listed above.       Sincerely,      Ros Griffith MD

## 2023-01-06 RX ORDER — FLUOXETINE 40 MG/1
CAPSULE ORAL
Qty: 90 CAPSULE | Refills: 1 | Status: SHIPPED | OUTPATIENT
Start: 2023-01-06 | End: 2024-01-04

## 2023-01-06 RX ORDER — OMEPRAZOLE 40 MG/1
40 CAPSULE, DELAYED RELEASE ORAL DAILY
Qty: 90 CAPSULE | Refills: 2 | Status: SHIPPED | OUTPATIENT
Start: 2023-01-06 | End: 2023-09-15

## 2023-01-06 RX ORDER — LOSARTAN POTASSIUM 100 MG/1
100 TABLET ORAL DAILY
Qty: 90 TABLET | Refills: 1 | Status: SHIPPED | OUTPATIENT
Start: 2023-01-06 | End: 2023-10-02

## 2023-01-06 RX ORDER — ATORVASTATIN CALCIUM 20 MG/1
20 TABLET, FILM COATED ORAL DAILY
Qty: 90 TABLET | Refills: 1 | Status: SHIPPED | OUTPATIENT
Start: 2023-01-06 | End: 2023-06-09 | Stop reason: ALTCHOICE

## 2023-03-01 ENCOUNTER — MYC REFILL (OUTPATIENT)
Dept: FAMILY MEDICINE | Facility: CLINIC | Age: 58
End: 2023-03-01
Payer: COMMERCIAL

## 2023-03-01 DIAGNOSIS — F51.04 PSYCHOPHYSIOLOGICAL INSOMNIA: Chronic | ICD-10-CM

## 2023-03-02 DIAGNOSIS — F51.04 PSYCHOPHYSIOLOGICAL INSOMNIA: Chronic | ICD-10-CM

## 2023-03-02 RX ORDER — LORAZEPAM 1 MG/1
1 TABLET ORAL EVERY 8 HOURS PRN
Qty: 20 TABLET | Refills: 0 | Status: SHIPPED | OUTPATIENT
Start: 2023-03-02 | End: 2023-06-01

## 2023-03-02 RX ORDER — DOXEPIN HYDROCHLORIDE 150 MG/1
CAPSULE ORAL
Qty: 90 CAPSULE | Refills: 3 | Status: SHIPPED | OUTPATIENT
Start: 2023-03-02 | End: 2023-06-01

## 2023-03-02 NOTE — TELEPHONE ENCOUNTER
"Last Written Prescription Date:  1/5/23  Last Fill Quantity: 30,  # refills: 1   Last office visit provider:  1/5/23     Requested Prescriptions   Pending Prescriptions Disp Refills     doxepin HCl (SINEQUAN) 150 MG capsule [Pharmacy Med Name: DOXEPIN 150MG CAPSULES] 30 capsule 1     Sig: TAKE 1 CAPSULE(150 MG) BY MOUTH AT BEDTIME       Tricyclic Antidepressants Protocol Passed - 3/2/2023  3:32 AM        Passed - Blood pressure under 140/90 in past 12 months     BP Readings from Last 3 Encounters:   01/05/23 130/84   08/04/22 126/64   11/30/21 128/72                 Passed - Recent (12 mo) or future (30 days) visit within the authorizing provider's specialty      Patient has had an office visit with the authorizing provider or a provider within the authorizing providers department within the previous 12 mos or has a future within next 30 days. See \"Patient Info\" tab in inbasket, or \"Choose Columns\" in Meds & Orders section of the refill encounter.              Passed - Medication is active on med list        Passed - Patient is age 18 or older           Overridden by Ros Griffith MD on Jan 6, 2023 12:26 PM   Drug-Drug   1. FLUOXETINE / TRICYCLIC ANTIDEPRESSANTS [Level: Major] [Reason: Will monitor drug levels/drug effects ]   Other Orders: FLUoxetine (PROZAC) 40 MG capsule         Overridden by Ros Griffith MD on Jan 5, 2023 10:11 AM   Drug-Drug   1. FLUOXETINE / TRICYCLIC ANTIDEPRESSANTS [Level: Major] [Reason: Inaccurate Warning]   Other Orders: FLUoxetine (PROZAC) 40 MG capsule        ADAL CARO RN 03/02/23 3:25 PM  "

## 2023-03-03 DIAGNOSIS — F41.1 GENERALIZED ANXIETY DISORDER: Chronic | ICD-10-CM

## 2023-03-05 RX ORDER — HYDROXYZINE HYDROCHLORIDE 25 MG/1
TABLET, FILM COATED ORAL
Qty: 40 TABLET | Refills: 3 | Status: SHIPPED | OUTPATIENT
Start: 2023-03-05 | End: 2024-02-29

## 2023-03-05 NOTE — TELEPHONE ENCOUNTER
"Last Written Prescription Date:  11/30/21  Last Fill Quantity: 40,  # refills: 3   Last office visit provider:  1/5/23     Requested Prescriptions   Pending Prescriptions Disp Refills     hydrOXYzine (ATARAX) 25 MG tablet [Pharmacy Med Name: HYDROXYZINE HCL 25MG TABS (WHITE)] 40 tablet 3     Sig: TAKE 1 TABLET(25 MG) BY MOUTH EVERY 4 HOURS AS NEEDED FOR ANXIETY       Antihistamines Protocol Passed - 3/3/2023  3:37 PM        Passed - Recent (12 mo) or future (30 days) visit within the authorizing provider's specialty     Patient has had an office visit with the authorizing provider or a provider within the authorizing providers department within the previous 12 mos or has a future within next 30 days. See \"Patient Info\" tab in inbasket, or \"Choose Columns\" in Meds & Orders section of the refill encounter.              Passed - Patient is age 3 or older     Apply age and/or weight-based dosing for peds patients age 3 and older.    Forward request to provider for patients under the age of 3.          Passed - Medication is active on med list             Ramona Paredes, RN 03/05/23 11:27 AM  "

## 2023-04-02 DIAGNOSIS — I10 BENIGN HYPERTENSION: Chronic | ICD-10-CM

## 2023-04-02 RX ORDER — LOSARTAN POTASSIUM 100 MG/1
TABLET ORAL
Qty: 90 TABLET | Refills: 1 | OUTPATIENT
Start: 2023-04-02

## 2023-04-02 NOTE — TELEPHONE ENCOUNTER
"Last Written Prescription Date: 1/6/2023  Last Fill Quantity: 90,  # refills: 1 Should have refill remaining, message sent to pharmacy.     Last office visit provider: 1/5/2023 with PCP Dr ROSALBA Griffith     Requested Prescriptions   Pending Prescriptions Disp Refills     losartan (COZAAR) 100 MG tablet [Pharmacy Med Name: LOSARTAN 100MG TABLETS] 90 tablet 1     Sig: TAKE 1 TABLET(100 MG) BY MOUTH DAILY       Angiotensin-II Receptors Passed - 4/2/2023  8:10 AM        Passed - Last blood pressure under 140/90 in past 12 months     BP Readings from Last 3 Encounters:   01/05/23 130/84   08/04/22 126/64   11/30/21 128/72                 Passed - Recent (12 mo) or future (30 days) visit within the authorizing provider's specialty     Patient has had an office visit with the authorizing provider or a provider within the authorizing providers department within the previous 12 mos or has a future within next 30 days. See \"Patient Info\" tab in inbasket, or \"Choose Columns\" in Meds & Orders section of the refill encounter.              Passed - Medication is active on med list        Passed - Patient is age 18 or older        Passed - Normal serum creatinine on file in past 12 months     Recent Labs   Lab Test 01/05/23  1038   CR 0.95       Ok to refill medication if creatinine is low          Passed - Normal serum potassium on file in past 12 months     Recent Labs   Lab Test 01/05/23  1038   POTASSIUM 4.2                         Shania Carrington RN 04/02/23 6:28 PM  "

## 2023-06-01 ENCOUNTER — OFFICE VISIT (OUTPATIENT)
Dept: FAMILY MEDICINE | Facility: CLINIC | Age: 58
End: 2023-06-01
Payer: COMMERCIAL

## 2023-06-01 VITALS
TEMPERATURE: 97.9 F | DIASTOLIC BLOOD PRESSURE: 70 MMHG | OXYGEN SATURATION: 95 % | SYSTOLIC BLOOD PRESSURE: 118 MMHG | HEART RATE: 73 BPM | WEIGHT: 159 LBS | BODY MASS INDEX: 24.1 KG/M2 | HEIGHT: 68 IN | RESPIRATION RATE: 16 BRPM

## 2023-06-01 DIAGNOSIS — R73.03 PREDIABETES: ICD-10-CM

## 2023-06-01 DIAGNOSIS — F33.41 RECURRENT MAJOR DEPRESSIVE DISORDER, IN PARTIAL REMISSION (H): Chronic | ICD-10-CM

## 2023-06-01 DIAGNOSIS — F41.1 GENERALIZED ANXIETY DISORDER: Primary | Chronic | ICD-10-CM

## 2023-06-01 DIAGNOSIS — E78.2 MIXED HYPERLIPIDEMIA: Chronic | ICD-10-CM

## 2023-06-01 DIAGNOSIS — F51.04 PSYCHOPHYSIOLOGICAL INSOMNIA: Chronic | ICD-10-CM

## 2023-06-01 DIAGNOSIS — K21.9 GERD WITHOUT ESOPHAGITIS: Chronic | ICD-10-CM

## 2023-06-01 DIAGNOSIS — Z79.899 CONTROLLED SUBSTANCE AGREEMENT SIGNED: Chronic | ICD-10-CM

## 2023-06-01 DIAGNOSIS — I10 BENIGN HYPERTENSION: Chronic | ICD-10-CM

## 2023-06-01 LAB
ANION GAP SERPL CALCULATED.3IONS-SCNC: 11 MMOL/L (ref 7–15)
BUN SERPL-MCNC: 10.7 MG/DL (ref 6–20)
CALCIUM SERPL-MCNC: 9.1 MG/DL (ref 8.6–10)
CHLORIDE SERPL-SCNC: 108 MMOL/L (ref 98–107)
CHOLEST SERPL-MCNC: 198 MG/DL
CREAT SERPL-MCNC: 0.9 MG/DL (ref 0.67–1.17)
DEPRECATED HCO3 PLAS-SCNC: 23 MMOL/L (ref 22–29)
GFR SERPL CREATININE-BSD FRML MDRD: >90 ML/MIN/1.73M2
GLUCOSE SERPL-MCNC: 109 MG/DL (ref 70–99)
HBA1C MFR BLD: 6.2 % (ref 0–5.6)
HDLC SERPL-MCNC: 47 MG/DL
LDLC SERPL CALC-MCNC: 136 MG/DL
NONHDLC SERPL-MCNC: 151 MG/DL
POTASSIUM SERPL-SCNC: 4.1 MMOL/L (ref 3.4–5.3)
SODIUM SERPL-SCNC: 142 MMOL/L (ref 136–145)
TRIGL SERPL-MCNC: 75 MG/DL

## 2023-06-01 PROCEDURE — 36415 COLL VENOUS BLD VENIPUNCTURE: CPT | Performed by: FAMILY MEDICINE

## 2023-06-01 PROCEDURE — 99214 OFFICE O/P EST MOD 30 MIN: CPT | Performed by: FAMILY MEDICINE

## 2023-06-01 PROCEDURE — 80061 LIPID PANEL: CPT | Performed by: FAMILY MEDICINE

## 2023-06-01 PROCEDURE — 83036 HEMOGLOBIN GLYCOSYLATED A1C: CPT | Performed by: FAMILY MEDICINE

## 2023-06-01 PROCEDURE — 80048 BASIC METABOLIC PNL TOTAL CA: CPT | Performed by: FAMILY MEDICINE

## 2023-06-01 RX ORDER — LORAZEPAM 1 MG/1
1 TABLET ORAL EVERY 8 HOURS PRN
Qty: 20 TABLET | Refills: 0 | Status: SHIPPED | OUTPATIENT
Start: 2023-06-01 | End: 2023-07-25

## 2023-06-01 RX ORDER — DOXEPIN HYDROCHLORIDE 150 MG/1
CAPSULE ORAL
Qty: 90 CAPSULE | Refills: 1 | Status: SHIPPED | OUTPATIENT
Start: 2023-06-01 | End: 2023-12-08

## 2023-06-01 ASSESSMENT — ANXIETY QUESTIONNAIRES
5. BEING SO RESTLESS THAT IT IS HARD TO SIT STILL: MORE THAN HALF THE DAYS
3. WORRYING TOO MUCH ABOUT DIFFERENT THINGS: MORE THAN HALF THE DAYS
GAD7 TOTAL SCORE: 13
GAD7 TOTAL SCORE: 13
IF YOU CHECKED OFF ANY PROBLEMS ON THIS QUESTIONNAIRE, HOW DIFFICULT HAVE THESE PROBLEMS MADE IT FOR YOU TO DO YOUR WORK, TAKE CARE OF THINGS AT HOME, OR GET ALONG WITH OTHER PEOPLE: VERY DIFFICULT
7. FEELING AFRAID AS IF SOMETHING AWFUL MIGHT HAPPEN: SEVERAL DAYS
4. TROUBLE RELAXING: MORE THAN HALF THE DAYS
8. IF YOU CHECKED OFF ANY PROBLEMS, HOW DIFFICULT HAVE THESE MADE IT FOR YOU TO DO YOUR WORK, TAKE CARE OF THINGS AT HOME, OR GET ALONG WITH OTHER PEOPLE?: VERY DIFFICULT
2. NOT BEING ABLE TO STOP OR CONTROL WORRYING: MORE THAN HALF THE DAYS
GAD7 TOTAL SCORE: 13
1. FEELING NERVOUS, ANXIOUS, OR ON EDGE: MORE THAN HALF THE DAYS
7. FEELING AFRAID AS IF SOMETHING AWFUL MIGHT HAPPEN: SEVERAL DAYS
6. BECOMING EASILY ANNOYED OR IRRITABLE: MORE THAN HALF THE DAYS

## 2023-06-01 ASSESSMENT — PATIENT HEALTH QUESTIONNAIRE - PHQ9
SUM OF ALL RESPONSES TO PHQ QUESTIONS 1-9: 8
SUM OF ALL RESPONSES TO PHQ QUESTIONS 1-9: 8
10. IF YOU CHECKED OFF ANY PROBLEMS, HOW DIFFICULT HAVE THESE PROBLEMS MADE IT FOR YOU TO DO YOUR WORK, TAKE CARE OF THINGS AT HOME, OR GET ALONG WITH OTHER PEOPLE: SOMEWHAT DIFFICULT

## 2023-06-01 ASSESSMENT — PAIN SCALES - GENERAL: PAINLEVEL: NO PAIN (0)

## 2023-06-01 NOTE — PROGRESS NOTES
Generalized anxiety disorder  SILVIA-7 score today is 13 which is down from previous 15 in January.    Recurrent major depressive disorder, in partial remission (H)  Current PHQ-9 score is 8 which is down from her previous 11 in January.  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    Psychophysiological insomnia  We have a CSA for lorazepam.  He is also using doxepin which he needs both of these medications refilled today.  - LORazepam (ATIVAN) 1 MG tablet  Dispense: 20 tablet; Refill: 0  - doxepin HCl (SINEQUAN) 150 MG capsule  Dispense: 90 capsule; Refill: 1    Controlled substance agreement signed  Done 1/5/2023 but did not get on the chart until 5/11/2023.    Benign hypertension  Well-controlled on his losartan 100 and Norvasc 5 mg.  Will monitor labs.  - Basic metabolic panel    Mixed hyperlipidemia  On Lipitor 20 mg with last LDL of 115.  Will recheck.  - Lipid panel reflex to direct LDL Fasting    Prediabetes  A1c of 6.0%.  He has lost weight since I last saw him.  He wants a recheck.  - Hemoglobin A1c    GERD without esophagitis  Using omeprazole 40 mg.    Patient declines a bivalent booster of COVID today.  Patient declines lung cancer screening for now as well.  He says he is too busy.  I will get back to him on his lab results by [employer] and only call with grossly abnormal values.  He can see me back in 6 months time for his med check.    Alley Oconnor is a 57 year old, presenting for the following health issues:  Recheck Medication (Med check fasting )        6/1/2023     9:45 AM   Additional Questions   Roomed by hermila babcock cma     History of Present Illness       Reason for visit:  6 month check up    He eats 2-3 servings of fruits and vegetables daily.He consumes 1 sweetened beverage(s) daily.He exercises with enough effort to increase his heart rate 10 to 19 minutes per day.  He exercises with enough effort to increase his heart rate 4 days per week.   He is taking  medications regularly.    Today's PHQ-9         PHQ-9 Total Score: 8    PHQ-9 Q9 Thoughts of better off dead/self-harm past 2 weeks :   Not at all    How difficult have these problems made it for you to do your work, take care of things at home, or get along with other people: Somewhat difficult  Today's SILVIA-7 Score: 13     PHQ-9:      6/1/2023     9:41 AM   Last PHQ-9   1.  Little interest or pleasure in doing things 1   2.  Feeling down, depressed, or hopeless 1   3.  Trouble falling or staying asleep, or sleeping too much 1   4.  Feeling tired or having little energy 1   5.  Poor appetite or overeating 1   6.  Feeling bad about yourself 1   7.  Trouble concentrating 1   8.  Moving slowly or restless 1   Q9: Thoughts of better off dead/self-harm past 2 weeks 0   PHQ-9 Total Score 8       GAD7:      6/1/2023     9:42 AM   SILVIA-7    1. Feeling nervous, anxious, or on edge 2   2. Not being able to stop or control worrying 2   3. Worrying too much about different things 2   4. Trouble relaxing 2   5. Being so restless that it is hard to sit still 2   6. Becoming easily annoyed or irritable 2   7. Feeling afraid, as if something awful might happen 1   SILVIA-7 Total Score 13   If you checked any problems, how difficult have they made it for you to do your work, take care of things at home, or get along with other people? Very difficult     Patient is here for his 6-month med check.  He has anxiety and depression which are somewhat controlled.  He is on fluoxetine.  He suffers from insomnia.  We have a CSA for lorazepam 20 tablets in 30 days maximum.  He has hypertension and hyperlipidemia and at our last visit was prediabetic with an A1c of 6.0%.  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  He is in the process now of selling his house in [place] and moving to his new house in [facility].      Objective    /70   Pulse 73   Temp 97.9  F (36.6  C)   Resp 16   Ht 1.715 m (5'  "7.5\")   Wt 72.1 kg (159 lb)   SpO2 95%   BMI 24.54 kg/m    Body mass index is 24.54 kg/m .  Physical Exam   GENERAL: healthy, alert and no distress  RESP: lungs clear to auscultation - no rales, rhonchi or wheezes  CV: regular rates and rhythm and without murmur  ABDOMEN: soft, nontender  MS: extremities normal- no gross deformities noted, no edema            "

## 2023-06-01 NOTE — LETTER
June 4, 2023      Elvin Trivedi  [address] [facility] [address]        Dear ,    We are writing to inform you of your test results.    Your labs good except for your cholesterol which has been out of control since 2011.  Despite doubling your dose of statin, your numbers are worse.  Please take your medication every night as it is helping to decrease your risk of heart attack and stroke.  Especially important as you are prediabetic.    Resulted Orders   Basic metabolic panel   Result Value Ref Range    Sodium 142 136 - 145 mmol/L    Potassium 4.1 3.4 - 5.3 mmol/L    Chloride 108 (H) 98 - 107 mmol/L    Carbon Dioxide (CO2) 23 22 - 29 mmol/L    Anion Gap 11 7 - 15 mmol/L    Urea Nitrogen 10.7 6.0 - 20.0 mg/dL    Creatinine 0.90 0.67 - 1.17 mg/dL    Calcium 9.1 8.6 - 10.0 mg/dL    Glucose 109 (H) 70 - 99 mg/dL    GFR Estimate >90 >60 mL/min/1.73m2      Comment:      eGFR calculated using 2021 CKD-EPI equation.   Lipid panel reflex to direct LDL Fasting   Result Value Ref Range    Cholesterol 198 <200 mg/dL    Triglycerides 75 <150 mg/dL    Direct Measure HDL 47 >=40 mg/dL    LDL Cholesterol Calculated 136 (H) <=100 mg/dL    Non HDL Cholesterol 151 (H) <130 mg/dL    Narrative    Cholesterol  Desirable:  <200 mg/dL    Triglycerides  Normal:  Less than 150 mg/dL  Borderline High:  150-199 mg/dL  High:  200-499 mg/dL  Very High:  Greater than or equal to 500 mg/dL    Direct Measure HDL  Female:  Greater than or equal to 50 mg/dL   Male:  Greater than or equal to 40 mg/dL    LDL Cholesterol  Desirable:  <100mg/dL  Above Desirable:  100-129 mg/dL   Borderline High:  130-159 mg/dL   High:  160-189 mg/dL   Very High:  >= 190 mg/dL    Non HDL Cholesterol  Desirable:  130 mg/dL  Above Desirable:  130-159 mg/dL  Borderline High:  160-189 mg/dL  High:  190-219 mg/dL  Very High:  Greater than or equal to 220 mg/dL   Hemoglobin A1c   Result Value Ref Range    Hemoglobin A1C 6.2 (H) 0.0 - 5.6 %      Comment:  Normal <5.7%   Prediabetes 5.7-6.4%    Diabetes 6.5% or higher     Note: Adopted from ADA consensus guidelines.       If you have any questions or concerns, please call the clinic at the number listed above.       Sincerely,      Ros Griffith MD

## 2023-06-05 ENCOUNTER — TELEPHONE (OUTPATIENT)
Dept: FAMILY MEDICINE | Facility: CLINIC | Age: 58
End: 2023-06-05
Payer: COMMERCIAL

## 2023-06-05 DIAGNOSIS — E78.2 MIXED HYPERLIPIDEMIA: Primary | Chronic | ICD-10-CM

## 2023-06-05 NOTE — TELEPHONE ENCOUNTER
Left message to call back for: patient  Information to relay to patient: Your labs good except for your cholesterol which has been out of control since 2011.  Despite doubling your dose of statin, your numbers are worse.  Please take your medication every night as it is helping to decrease your risk of heart attack and stroke.  Especially important as you are prediabetic.Pennie Delgado, [facility] on 6/5/2023 at 10:51 AM

## 2023-06-05 NOTE — TELEPHONE ENCOUNTER
----- Message from Ros Griffith MD sent at 6/4/2023  2:52 PM CDT -----  Patient does not appear to be watching his [employer] account.  Please call the patient and go over his results with my comments.  I have sent a letter as well but he is in the process of moving.  Thank you.

## 2023-06-07 NOTE — TELEPHONE ENCOUNTER
Pt notified. He has been taking his medication daily. Hasn't missed any doses.   Should he stay on the same dose?    'ok to leave message if no answer upon call back.

## 2023-06-09 RX ORDER — ROSUVASTATIN CALCIUM 20 MG/1
20 TABLET, COATED ORAL DAILY
Qty: 90 TABLET | Refills: 1 | Status: SHIPPED | OUTPATIENT
Start: 2023-06-09 | End: 2023-12-06

## 2023-06-09 NOTE — TELEPHONE ENCOUNTER
This dose is not adequate if he is consistently taking the medication.  I will send him a new prescription for rosuvastatin/Crestor instead.  He should make a follow-up appointment with me if he has not done so as of yet.

## 2023-07-25 ENCOUNTER — MYC REFILL (OUTPATIENT)
Dept: FAMILY MEDICINE | Facility: CLINIC | Age: 58
End: 2023-07-25
Payer: COMMERCIAL

## 2023-07-25 DIAGNOSIS — F51.04 PSYCHOPHYSIOLOGICAL INSOMNIA: Chronic | ICD-10-CM

## 2023-07-26 RX ORDER — LORAZEPAM 1 MG/1
1 TABLET ORAL EVERY 8 HOURS PRN
Qty: 20 TABLET | Refills: 0 | Status: SHIPPED | OUTPATIENT
Start: 2023-07-26 | End: 2023-09-15

## 2023-09-14 NOTE — PROGRESS NOTES
"Date: 2020 18:26:22  Clinician: Amy Toledo  Clinician NPI: [id number]  Patient: Elvin Trivedi  Patient : 1965  Patient Address: [address] [place] [address]  Patient Phone: [phone]  Visit Protocol: URI  Patient Summary:  Elvin is a 54 year old ( : 1965 ) male who initiated a Visit for COVID-19 (Coronavirus) evaluation and screening. When asked the question \"Please sign me up to receive news, health information and promotions from [employer].\", Elvin responded \"No\".    His symptoms consist of rhinitis, a cough, and nasal congestion.   Symptom details     Nasal secretions: The color of his mucus is clear and white.    Cough: Elvin coughs a few times an hour and his cough is not more bothersome at night. Phlegm does not come into his throat when he coughs. He does not believe his cough is caused by post-nasal drip.      Elvin denies having malaise, fever, myalgias, facial pain or pressure, sore throat, vomiting, nausea, teeth pain, ageusia, anosmia, diarrhea, ear pain, headache, wheezing, and chills. He also denies taking antibiotic medication for the symptoms and having recent facial or sinus surgery in the past 60 days. He is not experiencing dyspnea.   Precipitating events  He has recently been exposed to someone with influenza. Elvin has not been in close contact with any high risk individuals.   Pertinent COVID-19 (Coronavirus) information  Elvin does not work or volunteer as healthcare worker or a  and does not work or volunteer in a healthcare facility.   He does not live with a healthcare worker.   Elvin has had a close contact with a laboratory-confirmed COVID-19 patient within 14 days of symptom onset. He also has had a close contact with a suspected COVID-19 patient within 14 days of symptom onset. Additional information about contact with COVID-19 (Coronavirus) patient as reported by the patient (free text): Co-worker work   Pertinent medical history  Elvin " Last Visit: 8/28/23 VV   Next Appointment: 11/29/23    Requested Prescriptions     Pending Prescriptions Disp Refills    PARoxetine (PAXIL) 10 MG tablet [Pharmacy Med Name: PAROXETINE HCL 10 MG TABLET] 30 tablet 3     Sig: TAKE 1 TABLET BY MOUTH EVERY DAY needs a return to work/school note.   Weight: 160 lbs   Elvin smokes or uses smokeless tobacco.   Additional information as reported by the patient (free text): Need to be tested----NO SPLEEN   Weight: 160 lbs    MEDICATIONS: atorvastatin oral, omeprazole-sodium bicarbonate oral, fluoxetine oral, losartan oral, ALLERGIES: NKDA  Clinician Response:  Dear Elvin,   Your symptoms show that you may have coronavirus (COVID-19). This illness can cause fever, cough and trouble breathing. Many people get a mild case and get better on their own. Some people can get very sick.   Will I be tested for COVID-19?  We would recommend you be tested for coronavirus. Here is how to get that scheduled:   Call [phone]. Tell them you were referred by [facility] to have a COVID-19 test. You will be scheduled at one of our [facility] testing locations (drive-up). Please have your [facility] visit information ready when you call including your visit ID number to verify you were referred.    How can I protect others in the meantime?  First, stay home and away from others (self-isolate) until:   You've had no fever---and no medicine that reduces fever---for 3 full days (72 hours). And...    Your other symptoms have gotten better. For example, your cough or breathing has improved. And...    At least 7 days have passed since your symptoms started.   During this time:   Don't go to work, school or anywhere else.     Stay away from others in your home. No hugging, kissing or shaking hands.    Don't let anyone visit.    Cover your mouth and nose with a mask, tissue or wash cloth to avoid spreading germs.    Wash your hands and face often. Use soap and water.   How can I take care of myself?  1.Take Tylenol (acetaminophen) for fever or pain. If you have liver or kidney problems, ask your family doctor if it's okay to take Tylenol.   Adults can take either:    650 mg (two 325 mg pills) every 4 to 6 hours, or...    1,000 mg (two 500 mg pills) every 8 hours  as needed.     Note: Don't take more than 3,000 mg in one day.   For children, check the Tylenol bottle for the right dose. The dose is based on the child's age or weight.  2.If you have other health problems (like cancer, heart failure, an organ transplant or severe kidney disease): Call your specialty clinic if you don't feel better in the next 2 days.  3.Know when to call 911: If your breathing is so bad that it keeps you from doing normal activities, call 911 or go to the emergency room. Tell them that you've been staying home and may have COVID-19.  4.Sign up for [employer]. We know it's scary to hear that you might have COVID-19. We want to track your symptoms to make sure you're okay over the next 2 weeks. Please look for an email from [employer]---this is a free, online program that we'll use to keep in touch. To sign up, follow the link in the email. Learn more at [email or web address].  Where can I get more information?  To learn more about COVID-19 and how to care for yourself at home, please visit the CDC website at https://www.cdc.gov/coronavirus/2019-ncov/about/steps-when-sick.html.  For more about your care at [facility], please visit [email or web address]     Diagnosis: Cough  Diagnosis ICD: R05

## 2023-09-15 ENCOUNTER — MYC REFILL (OUTPATIENT)
Dept: FAMILY MEDICINE | Facility: CLINIC | Age: 58
End: 2023-09-15
Payer: COMMERCIAL

## 2023-09-15 DIAGNOSIS — K21.9 GERD WITHOUT ESOPHAGITIS: Chronic | ICD-10-CM

## 2023-09-15 DIAGNOSIS — F51.04 PSYCHOPHYSIOLOGICAL INSOMNIA: Chronic | ICD-10-CM

## 2023-09-15 RX ORDER — OMEPRAZOLE 40 MG/1
40 CAPSULE, DELAYED RELEASE ORAL DAILY
Qty: 90 CAPSULE | Refills: 0 | Status: SHIPPED | OUTPATIENT
Start: 2023-09-15 | End: 2023-12-05

## 2023-09-15 RX ORDER — LORAZEPAM 1 MG/1
1 TABLET ORAL EVERY 8 HOURS PRN
Qty: 20 TABLET | Refills: 0 | Status: SHIPPED | OUTPATIENT
Start: 2023-09-15 | End: 2023-12-05

## 2023-10-02 DIAGNOSIS — I10 BENIGN HYPERTENSION: Chronic | ICD-10-CM

## 2023-10-02 RX ORDER — LOSARTAN POTASSIUM 100 MG/1
100 TABLET ORAL DAILY
Qty: 90 TABLET | Refills: 1 | Status: SHIPPED | OUTPATIENT
Start: 2023-10-02 | End: 2024-04-01

## 2023-10-22 DIAGNOSIS — I10 BENIGN HYPERTENSION: ICD-10-CM

## 2023-10-23 RX ORDER — AMLODIPINE BESYLATE 5 MG/1
TABLET ORAL
Qty: 90 TABLET | Refills: 2 | Status: SHIPPED | OUTPATIENT
Start: 2023-10-23 | End: 2024-08-19

## 2023-12-05 ENCOUNTER — MYC REFILL (OUTPATIENT)
Dept: FAMILY MEDICINE | Facility: CLINIC | Age: 58
End: 2023-12-05
Payer: COMMERCIAL

## 2023-12-05 DIAGNOSIS — F51.04 PSYCHOPHYSIOLOGICAL INSOMNIA: Chronic | ICD-10-CM

## 2023-12-05 DIAGNOSIS — K21.9 GERD WITHOUT ESOPHAGITIS: Chronic | ICD-10-CM

## 2023-12-06 DIAGNOSIS — E78.2 MIXED HYPERLIPIDEMIA: Chronic | ICD-10-CM

## 2023-12-06 RX ORDER — OMEPRAZOLE 40 MG/1
40 CAPSULE, DELAYED RELEASE ORAL DAILY
Qty: 90 CAPSULE | Refills: 1 | Status: SHIPPED | OUTPATIENT
Start: 2023-12-06 | End: 2024-05-22

## 2023-12-06 RX ORDER — ROSUVASTATIN CALCIUM 20 MG/1
20 TABLET, COATED ORAL DAILY
Qty: 90 TABLET | Refills: 1 | Status: SHIPPED | OUTPATIENT
Start: 2023-12-06 | End: 2024-05-22

## 2023-12-07 RX ORDER — LORAZEPAM 1 MG/1
1 TABLET ORAL EVERY 8 HOURS PRN
Qty: 20 TABLET | Refills: 0 | Status: SHIPPED | OUTPATIENT
Start: 2023-12-07 | End: 2024-02-29

## 2023-12-08 DIAGNOSIS — F51.04 PSYCHOPHYSIOLOGICAL INSOMNIA: Chronic | ICD-10-CM

## 2023-12-08 RX ORDER — DOXEPIN HYDROCHLORIDE 50 MG/1
50 CAPSULE ORAL AT BEDTIME
Qty: 90 CAPSULE | Refills: 0 | Status: SHIPPED | OUTPATIENT
Start: 2023-12-08 | End: 2024-03-04

## 2024-01-02 DIAGNOSIS — F33.41 RECURRENT MAJOR DEPRESSIVE DISORDER, IN PARTIAL REMISSION (H): Chronic | ICD-10-CM

## 2024-01-02 DIAGNOSIS — F41.1 GENERALIZED ANXIETY DISORDER: Chronic | ICD-10-CM

## 2024-01-04 RX ORDER — FLUOXETINE 40 MG/1
CAPSULE ORAL
Qty: 90 CAPSULE | Refills: 0 | Status: SHIPPED | OUTPATIENT
Start: 2024-01-04 | End: 2024-04-01

## 2024-01-11 ENCOUNTER — OFFICE VISIT (OUTPATIENT)
Dept: FAMILY MEDICINE | Facility: CLINIC | Age: 59
End: 2024-01-11
Payer: COMMERCIAL

## 2024-01-11 VITALS
WEIGHT: 170 LBS | OXYGEN SATURATION: 96 % | BODY MASS INDEX: 25.76 KG/M2 | TEMPERATURE: 98.2 F | DIASTOLIC BLOOD PRESSURE: 78 MMHG | RESPIRATION RATE: 16 BRPM | HEIGHT: 68 IN | HEART RATE: 69 BPM | SYSTOLIC BLOOD PRESSURE: 128 MMHG

## 2024-01-11 DIAGNOSIS — F41.1 GENERALIZED ANXIETY DISORDER: Chronic | ICD-10-CM

## 2024-01-11 DIAGNOSIS — F51.04 PSYCHOPHYSIOLOGICAL INSOMNIA: Chronic | ICD-10-CM

## 2024-01-11 DIAGNOSIS — Z12.5 SCREENING FOR PROSTATE CANCER: ICD-10-CM

## 2024-01-11 DIAGNOSIS — R53.82 CHRONIC FATIGUE: ICD-10-CM

## 2024-01-11 DIAGNOSIS — Z12.11 SCREENING FOR COLON CANCER: ICD-10-CM

## 2024-01-11 DIAGNOSIS — E78.2 MIXED HYPERLIPIDEMIA: Chronic | ICD-10-CM

## 2024-01-11 DIAGNOSIS — F33.41 RECURRENT MAJOR DEPRESSIVE DISORDER, IN PARTIAL REMISSION (H): Chronic | ICD-10-CM

## 2024-01-11 DIAGNOSIS — Z00.00 ROUTINE GENERAL MEDICAL EXAMINATION AT A HEALTH CARE FACILITY: Primary | ICD-10-CM

## 2024-01-11 DIAGNOSIS — R39.16 BENIGN PROSTATIC HYPERPLASIA (BPH) WITH STRAINING ON URINATION: ICD-10-CM

## 2024-01-11 DIAGNOSIS — N40.1 BENIGN PROSTATIC HYPERPLASIA (BPH) WITH STRAINING ON URINATION: ICD-10-CM

## 2024-01-11 DIAGNOSIS — I10 BENIGN HYPERTENSION: Chronic | ICD-10-CM

## 2024-01-11 DIAGNOSIS — R73.03 PREDIABETES: ICD-10-CM

## 2024-01-11 DIAGNOSIS — Z86.0100 HISTORY OF COLONIC POLYPS: ICD-10-CM

## 2024-01-11 DIAGNOSIS — Z23 IMMUNIZATION DUE: ICD-10-CM

## 2024-01-11 DIAGNOSIS — Z79.899 CONTROLLED SUBSTANCE AGREEMENT SIGNED: Chronic | ICD-10-CM

## 2024-01-11 PROBLEM — K63.5 POLYP OF COLON: Status: RESOLVED | Noted: 2020-11-06 | Resolved: 2024-01-11

## 2024-01-11 LAB
ERYTHROCYTE [DISTWIDTH] IN BLOOD BY AUTOMATED COUNT: 12.5 % (ref 10–15)
HBA1C MFR BLD: 6.1 % (ref 0–5.6)
HCT VFR BLD AUTO: 48.2 % (ref 40–53)
HGB BLD-MCNC: 16.6 G/DL (ref 13.3–17.7)
MCH RBC QN AUTO: 31.6 PG (ref 26.5–33)
MCHC RBC AUTO-ENTMCNC: 34.4 G/DL (ref 31.5–36.5)
MCV RBC AUTO: 92 FL (ref 78–100)
PLATELET # BLD AUTO: 281 10E3/UL (ref 150–450)
RBC # BLD AUTO: 5.26 10E6/UL (ref 4.4–5.9)
WBC # BLD AUTO: 8 10E3/UL (ref 4–11)

## 2024-01-11 PROCEDURE — G0103 PSA SCREENING: HCPCS | Performed by: FAMILY MEDICINE

## 2024-01-11 PROCEDURE — 83036 HEMOGLOBIN GLYCOSYLATED A1C: CPT | Performed by: FAMILY MEDICINE

## 2024-01-11 PROCEDURE — 99396 PREV VISIT EST AGE 40-64: CPT | Mod: 25 | Performed by: FAMILY MEDICINE

## 2024-01-11 PROCEDURE — 36415 COLL VENOUS BLD VENIPUNCTURE: CPT | Performed by: FAMILY MEDICINE

## 2024-01-11 PROCEDURE — 80061 LIPID PANEL: CPT | Performed by: FAMILY MEDICINE

## 2024-01-11 PROCEDURE — 99214 OFFICE O/P EST MOD 30 MIN: CPT | Mod: 25 | Performed by: FAMILY MEDICINE

## 2024-01-11 PROCEDURE — 90471 IMMUNIZATION ADMIN: CPT | Performed by: FAMILY MEDICINE

## 2024-01-11 PROCEDURE — 84443 ASSAY THYROID STIM HORMONE: CPT | Performed by: FAMILY MEDICINE

## 2024-01-11 PROCEDURE — 80053 COMPREHEN METABOLIC PANEL: CPT | Performed by: FAMILY MEDICINE

## 2024-01-11 PROCEDURE — 85027 COMPLETE CBC AUTOMATED: CPT | Performed by: FAMILY MEDICINE

## 2024-01-11 PROCEDURE — 90686 IIV4 VACC NO PRSV 0.5 ML IM: CPT | Performed by: FAMILY MEDICINE

## 2024-01-11 ASSESSMENT — ANXIETY QUESTIONNAIRES
GAD7 TOTAL SCORE: 9
7. FEELING AFRAID AS IF SOMETHING AWFUL MIGHT HAPPEN: SEVERAL DAYS
GAD7 TOTAL SCORE: 9
2. NOT BEING ABLE TO STOP OR CONTROL WORRYING: SEVERAL DAYS
6. BECOMING EASILY ANNOYED OR IRRITABLE: MORE THAN HALF THE DAYS
5. BEING SO RESTLESS THAT IT IS HARD TO SIT STILL: SEVERAL DAYS
3. WORRYING TOO MUCH ABOUT DIFFERENT THINGS: SEVERAL DAYS
IF YOU CHECKED OFF ANY PROBLEMS ON THIS QUESTIONNAIRE, HOW DIFFICULT HAVE THESE PROBLEMS MADE IT FOR YOU TO DO YOUR WORK, TAKE CARE OF THINGS AT HOME, OR GET ALONG WITH OTHER PEOPLE: VERY DIFFICULT
4. TROUBLE RELAXING: SEVERAL DAYS
1. FEELING NERVOUS, ANXIOUS, OR ON EDGE: MORE THAN HALF THE DAYS
GAD7 TOTAL SCORE: 9
7. FEELING AFRAID AS IF SOMETHING AWFUL MIGHT HAPPEN: SEVERAL DAYS
8. IF YOU CHECKED OFF ANY PROBLEMS, HOW DIFFICULT HAVE THESE MADE IT FOR YOU TO DO YOUR WORK, TAKE CARE OF THINGS AT HOME, OR GET ALONG WITH OTHER PEOPLE?: VERY DIFFICULT

## 2024-01-11 ASSESSMENT — ENCOUNTER SYMPTOMS
DIZZINESS: 0
HEMATURIA: 0
COUGH: 0
DYSURIA: 1
SHORTNESS OF BREATH: 0
FEVER: 0
HEMATOCHEZIA: 0
PALPITATIONS: 0
JOINT SWELLING: 0
HEADACHES: 0
SORE THROAT: 0
MYALGIAS: 1
NERVOUS/ANXIOUS: 1
CHILLS: 0
PARESTHESIAS: 0
HEARTBURN: 1
EYE PAIN: 0
ABDOMINAL PAIN: 0
WEAKNESS: 0
NAUSEA: 0
ARTHRALGIAS: 0
FREQUENCY: 1
CONSTIPATION: 0
DIARRHEA: 1

## 2024-01-11 ASSESSMENT — PATIENT HEALTH QUESTIONNAIRE - PHQ9
10. IF YOU CHECKED OFF ANY PROBLEMS, HOW DIFFICULT HAVE THESE PROBLEMS MADE IT FOR YOU TO DO YOUR WORK, TAKE CARE OF THINGS AT HOME, OR GET ALONG WITH OTHER PEOPLE: SOMEWHAT DIFFICULT
SUM OF ALL RESPONSES TO PHQ QUESTIONS 1-9: 13
SUM OF ALL RESPONSES TO PHQ QUESTIONS 1-9: 13

## 2024-01-11 ASSESSMENT — PAIN SCALES - GENERAL: PAINLEVEL: NO PAIN (0)

## 2024-01-11 NOTE — LETTER
[facility]  01/11/24  Patient: Elvin Trivedi  YOB: 1965  Medical Record Number: [id number]                                                                                  Non-Opioid Controlled Substance Agreement    This is an agreement between you and your provider regarding safe and appropriate use of controlled substances prescribed by your care team. Controlled substances are?medicines that can cause physical and mental dependence (abuse).     There are strict laws about having and using these medicines. We here at [facility] are  committed to working with you in your efforts to get better. To support you in this work, we'll help you schedule regular office appointments for medicine refills. If we must cancel or change your appointment for any reason, we'll make sure you have enough medicine to last until your next appointment.     As a Provider, I will:   Listen carefully to your concerns while treating you with respect.   Recommend a treatment plan that I believe is in your best interest and may involve therapies other than medicine.    Talk with you often about the possible benefits and the risk of harm of any medicine that we prescribe for you.  Assess the safety of this medicine and check how well it works.    Provide a plan on how to taper (discontinue or go off) using this medicine if the decision is made to stop its use.      ::  As a Patient, I understand controlled substances:     Are prescribed by my care provider to help me function or work and manage my condition(s).?  Are strong medicines and can cause serious side effects.     Need to be taken exactly as prescribed.?Combining controlled substances with certain medicines or chemicals (such as illegal drugs, alcohol, sedatives, sleeping pills, and benzodiazepines) can be dangerous or even fatal.? If I stop taking my medicines suddenly, I may have severe withdrawal symptoms.     The risks,  benefits, and side effects of these medicine(s) were explained to me. I agree that:    I will take part in other treatments as advised by my care team. This may be psychiatry or counseling, physical therapy, behavioral therapy, group treatment or a referral to specialist.    I will keep all my appointments and understand this is part of the monitoring of controlled substances.?My care team may require an office visit for EVERY controlled substance refill. If I miss appointments or don t follow instructions, my care team may stop my medicine    I will take my medicines as prescribed. I will not change the dose or schedule unless my care team tells me to. There will be no refills if I run out early.      I may be asked to come to the clinic and complete a urine drug test or complete a pill count. If I don t give a urine sample or participate in a pill count, the care team may stop my medicine.    I will only receive controlled substance prescriptions from this clinic. If I am treated by another provider, I will tell them that I am taking controlled substances and that I have a treatment agreement with this provider. I will inform my [facility] care team within one business day if I am given a prescription for any controlled substance by another healthcare provider. My [facility] care team can contact other providers and pharmacists about my use of any medicines.    It is up to me to make sure that I don't run out of my medicines on weekends or holidays.?If my care team is willing to refill my prescription without a visit, I must request refills only during office hours. Refills may take up to 3 business days to process. I will use one pharmacy to fill all my controlled substance prescriptions. I will notify the clinic about any changes to my insurance or medicine availability.    I am responsible for my prescriptions. If the medicine/prescription is lost, stolen or destroyed, it will not be replaced.?I  also agree not to share controlled substance medicines with anyone.     I am aware I should not use any illegal or recreational drugs. I agree not to drink alcohol unless my care team says I can.     If I enroll in the [place] [facility] program, I will tell my care team before my next refill.    I will tell my care team right away if I become pregnant, have a new medical problem treated outside of my regular clinic, or have a change in my medicines.     I understand that this medicine can affect my thinking, judgment and reaction time.? Alcohol and drugs affect the brain and body, which can affect the safety of my driving. Being under the influence of alcohol or drugs can affect my decision-making, behaviors, personal safety and the safety of others. Driving while impaired (DWI) can occur if a person is driving, operating or in physical control of a car, motorcycle, boat, snowmobile, ATV, motorbike, off-road vehicle or any other motor vehicle ([place] Statute 169A.20). I understand the risk if I choose to drive or operate any vehicle or machinery.    I understand that if I do not follow any of the conditions above, my prescriptions or treatment may be stopped or changed.   I agree that my provider, clinic care team and pharmacy may work with any city, state or federal law enforcement agency that investigates the misuse, sale or other diversion of my controlled medicine. I will allow my provider to discuss my care with, or share a copy of, this agreement with any other treating provider, pharmacy or emergency room where I receive care.     I have read this agreement and have asked questions about anything I did not understand.    ________________________________________________________  Patient Signature - Elvin A Blomster     ___________________                   Date     ________________________________________________________  Provider Signature - Ros Griffith MD       ___________________                    Date     ________________________________________________________  Witness Signature (required if provider not present while patient signing)          ___________________                   Date

## 2024-01-11 NOTE — PROGRESS NOTES
SUBJECTIVE:   Elvin is a 58 year old, presenting for the following:  Physical (Fasting )        2024     9:40 AM   Additional Questions   Roomed by hermila babcock cma       Healthy Habits:     Getting at least 3 servings of Calcium per day:  Yes    Bi-annual eye exam:  Yes    Dental care twice a year:  NO    Sleep apnea or symptoms of sleep apnea:  Daytime drowsiness and Excessive snoring    Diet:  Regular (no restrictions)    Frequency of exercise:  1 day/week    Duration of exercise:  15-30 minutes    Taking medications regularly:  Yes    Medication side effects:  None    Additional concerns today:  No      Today's PHQ-9 Score:       2024     9:33 AM   PHQ-9 SCORE   PHQ-9 Total Score MyChart 13 (Moderate depression)   PHQ-9 Total Score 13         Social History     Tobacco Use    Smoking status: Former     Packs/day: .5     Types: Cigarettes     Quit date: 8/10/2022     Years since quittin.4     Passive exposure: Current    Smokeless tobacco: Never    Tobacco comments:     Wife still smokes.   Substance Use Topics    Alcohol use: Yes     Comment: Alcoholic Drinks/day: 1 or 2 times a year             2024     9:36 AM   Alcohol Use   Prescreen: >3 drinks/day or >7 drinks/week? No       Last PSA:   Prostate Specific Antigen Screen   Date Value Ref Range Status   2023 1.32 0.00 - 3.50 ng/mL Final   2021 1.45 0.00 - 3.50 ug/L Final       Reviewed orders with patient. Reviewed health maintenance and updated orders accordingly - Yes  Labs reviewed in EPIC  BP Readings from Last 3 Encounters:   24 128/78   23 118/70   23 130/84    Wt Readings from Last 3 Encounters:   24 77.1 kg (170 lb)   23 72.1 kg (159 lb)   23 75.6 kg (166 lb 9.6 oz)               Reviewed and updated as needed this visit by clinical staff   Tobacco  Allergies  Meds              Reviewed and updated as needed this visit by Provider                 Past Medical History:   Diagnosis Date     "Ruptured appendix 1976    Ruptured spleen       Past Surgical History:   Procedure Laterality Date    APPENDECTOMY  1976    Ruptured    SPLENECTOMY, TOTAL      Fell while scaling a railing and ruptured spleen.    SURGICAL HISTORY OF -       spleen and appy    WISDOM TOOTH EXTRACTION         Review of Systems   Constitutional:  Negative for chills and fever.   HENT:  Positive for ear pain. Negative for congestion, hearing loss and sore throat.    Eyes:  Negative for pain and visual disturbance.   Respiratory:  Negative for cough and shortness of breath.    Cardiovascular:  Negative for chest pain, palpitations and peripheral edema.   Gastrointestinal:  Positive for diarrhea and heartburn. Negative for abdominal pain, constipation, hematochezia and nausea.   Genitourinary:  Positive for dysuria and frequency. Negative for genital sores, hematuria and urgency.   Musculoskeletal:  Positive for myalgias. Negative for arthralgias and joint swelling.   Skin:  Negative for rash.   Neurological:  Negative for dizziness, weakness, headaches and paresthesias.   Psychiatric/Behavioral:  Positive for mood changes. The patient is nervous/anxious.        OBJECTIVE:   /78   Pulse 69   Temp 98.2  F (36.8  C)   Resp 16   Ht 1.715 m (5' 7.5\")   Wt 77.1 kg (170 lb)   SpO2 96%   BMI 26.23 kg/m      Physical Exam  General appearance - alert, well appearing, and in no distress and normal appearing weight  Mental status - normal mood, behavior, speech, dress, motor activity, and thought processes, flat affect  Eyes - pupils equal and reactive, extraocular eye movements intact  Ears - bilateral TM's and external ear canals normal  Nose - normal and patent, no erythema, discharge   Mouth - mucous membranes moist, pharynx normal without lesions  Neck - supple, no significant adenopathy, carotids upstroke normal bilaterally, no bruits, thyroid exam: thyroid is normal in size without nodules or tenderness  Chest - clear to " auscultation, no wheezes, rales or rhonchi, symmetric air entry  Heart - normal rate and regular rhythm, no murmurs noted  Abdomen - soft, nontender, nondistended, no masses or organomegaly   Male -not examined  Back exam - full range of motion, no tenderness, palpable spasm or pain on motion  Neurological - alert, oriented, normal speech, no focal findings or movement disorder noted, cranial nerves II through XII intact, DTR's normal and symmetric  Musculoskeletal - no joint tenderness, deformity or swelling  Extremities - peripheral pulses normal, no pedal edema, no clubbing or cyanosis  Skin - normal coloration and turgor, no rashes, no suspicious skin lesions noted    Labs reviewed in Epic  Results for orders placed or performed in visit on 01/11/24   Hemoglobin A1c     Status: Abnormal   Result Value Ref Range    Hemoglobin A1C 6.1 (H) 0.0 - 5.6 %   Comprehensive metabolic panel (BMP + Alb, Alk Phos, ALT, AST, Total. Bili, TP)     Status: Abnormal   Result Value Ref Range    Sodium 138 135 - 145 mmol/L    Potassium 4.4 3.4 - 5.3 mmol/L    Carbon Dioxide (CO2) 25 22 - 29 mmol/L    Anion Gap 10 7 - 15 mmol/L    Urea Nitrogen 12.2 6.0 - 20.0 mg/dL    Creatinine 1.11 0.67 - 1.17 mg/dL    GFR Estimate 77 >60 mL/min/1.73m2    Calcium 9.7 8.6 - 10.0 mg/dL    Chloride 103 98 - 107 mmol/L    Glucose 113 (H) 70 - 99 mg/dL    Alkaline Phosphatase 65 40 - 150 U/L    AST 22 0 - 45 U/L    ALT 38 0 - 70 U/L    Protein Total 7.5 6.4 - 8.3 g/dL    Albumin 4.5 3.5 - 5.2 g/dL    Bilirubin Total 0.4 <=1.2 mg/dL   Lipid panel reflex to direct LDL Fasting     Status: Abnormal   Result Value Ref Range    Cholesterol 208 (H) <200 mg/dL    Triglycerides 95 <150 mg/dL    Direct Measure HDL 56 >=40 mg/dL    LDL Cholesterol Calculated 133 (H) <=100 mg/dL    Non HDL Cholesterol 152 (H) <130 mg/dL    Patient Fasting > 8hrs? Unknown     Narrative    Cholesterol  Desirable:  <200 mg/dL    Triglycerides  Normal:  Less than 150  mg/dL  Borderline High:  150-199 mg/dL  High:  200-499 mg/dL  Very High:  Greater than or equal to 500 mg/dL    Direct Measure HDL  Female:  Greater than or equal to 50 mg/dL   Male:  Greater than or equal to 40 mg/dL    LDL Cholesterol  Desirable:  <100mg/dL  Above Desirable:  100-129 mg/dL   Borderline High:  130-159 mg/dL   High:  160-189 mg/dL   Very High:  >= 190 mg/dL    Non HDL Cholesterol  Desirable:  130 mg/dL  Above Desirable:  130-159 mg/dL  Borderline High:  160-189 mg/dL  High:  190-219 mg/dL  Very High:  Greater than or equal to 220 mg/dL   PSA, screen     Status: Normal   Result Value Ref Range    Prostate Specific Antigen Screen 2.71 0.00 - 3.50 ng/mL    Narrative    This result is obtained using the Roche Elecsys total PSA method on the mainor e801 immunoassay analyzer. Results obtained with different assay methods or kits cannot be used interchangeably.   CBC with platelets     Status: Normal   Result Value Ref Range    WBC Count 8.0 4.0 - 11.0 10e3/uL    RBC Count 5.26 4.40 - 5.90 10e6/uL    Hemoglobin 16.6 13.3 - 17.7 g/dL    Hematocrit 48.2 40.0 - 53.0 %    MCV 92 78 - 100 fL    MCH 31.6 26.5 - 33.0 pg    MCHC 34.4 31.5 - 36.5 g/dL    RDW 12.5 10.0 - 15.0 %    Platelet Count 281 150 - 450 10e3/uL   TSH with free T4 reflex     Status: Normal   Result Value Ref Range    TSH 2.40 0.30 - 4.20 uIU/mL       ASSESSMENT/PLAN:     Routine general medical examination at a health care facility  Patient had his colonoscopy in November 2020 which was good for 3 years only due to multiple polyps.  He is due for colonoscopy.  He has not had lung cancer screening done.  He is eligible for COVID, flu, and Shingrix today.  - PRIMARY CARE FOLLOW-UP SCHEDULING    Generalized anxiety disorder  Currently on fluoxetine 40 mg and doxepin 50 mg.  SILVIA-7 score today is 9.    Recurrent major depressive disorder, in partial remission (H24)  PHQ-9 score today is 13 which is up from previously.  He tells me he feels fatigued.   Things seem to be going well with his job but he is exhausted.    Psychophysiological insomnia  Patient is currently on 50 mg of doxepin at bedtime.    Controlled substance agreement signed  We have a controlled substance agreement for 20 1 mg lorazepam every 30 days.    Benign hypertension  Well-controlled on losartan 100 and Norvasc 5 mg.  Will monitor labs.  - Comprehensive metabolic panel (BMP + Alb, Alk Phos, ALT, AST, Total. Bili, TP)  - Comprehensive metabolic panel (BMP + Alb, Alk Phos, ALT, AST, Total. Bili, TP)    Mixed hyperlipidemia  Previously on Lipitor 20 mg with last LDL of 136 which is up from previous 90.  Patient was switched to rosuvastatin 20 mg.  Will monitor.  - Lipid panel reflex to direct LDL Fasting  - Lipid panel reflex to direct LDL Fasting    Prediabetes  Last A1c was 6.2%.  Will recheck.  - Hemoglobin A1c  - Hemoglobin A1c    Chronic fatigue  May have a left thyroid nodule will on exam.  Will check labs as he is experiencing fatigue.  - CBC with platelets  - TSH with free T4 reflex  - CBC with platelets  - TSH with free T4 reflex    Benign prostatic hyperplasia (BPH) with straining on urination  He is having symptoms of BPH but when offered Flomax he declines.  He will think about it and Marakana message me if he changes his mind about the medication.    Screening for prostate cancer  - PSA, screen  - PSA, screen    History of colonic polyps  Last colonoscopy over 3 years ago.  A couple months behind.  Will order referral.  - Colonoscopy Screening  Referral    Screening for colon cancer  - Colonoscopy Screening  Referral    Immunization due  Patient was willing to have his flu shot but has not had COVID-vaccine since 2021.    I will get back to him on his lab results by Marakana or mail and only call with grossly abnormal values.  He should see me back in 6 months time for his next med check.    Patient has been advised of split billing requirements and indicates  "understanding: Yes      COUNSELING:   Reviewed preventive health counseling, as reflected in patient instructions      BMI:   Estimated body mass index is 26.23 kg/m  as calculated from the following:    Height as of this encounter: 1.715 m (5' 7.5\").    Weight as of this encounter: 77.1 kg (170 lb).         He reports that he quit smoking about 17 months ago. His smoking use included cigarettes. He smoked an average of 0.5 packs per day. He has been exposed to tobacco smoke. He has never used smokeless tobacco.          Ros Griffith MD  [facility]  Answers submitted by the patient for this visit:  Patient Health Questionnaire (Submitted on 1/11/2024)  If you checked off any problems, how difficult have these problems made it for you to do your work, take care of things at home, or get along with other people?: Somewhat difficult  PHQ9 TOTAL SCORE: 13  SILVIA-7 (Submitted on 1/11/2024)  SILVIA 7 TOTAL SCORE: 9    "

## 2024-01-11 NOTE — COMMUNITY RESOURCES LIST (ENGLISH)
01/11/2024    [employer]  N/A  For questions about this resource list or additional care needs, please contact your primary care clinic or care manager.  Phone: [phone]   Email: N/A   Address: [address] [place] [id number]   Hours: N/A        Financial Stability       Utility payment assistance  1  [facility] [place] Home [employer] Assistance Program (WHEAP) - Utility payment assistance Distance: 0.28 miles      Phone/Virtual   [address]  [address] [place] [address]  Language: English  Hours: Mon - Fri 8:00 AM - 4:30 PM  Fees: Free   Phone: (608) [phone] Email: [email or web address]. Website: https://www.[facility].gov/index.asp?Type=B_BASIC&SEC={[email or web address]}          Important Numbers & Websites       Emergency Services   [address]  [place] [address]  Poison Control   (871) [phone]  Suicide Prevention Lifeline   (177) 950-3725 (TALK)  Child Abuse Hotline   (475) 748-9669 (4-A-Child)  Sexual Assault Hotline   (661) 215-1076 (HOPE)  National Runaway Safeline   (966) 482-8712 (RUNAWAY)  All-Options Talkline   (415) 820-8254  Substance Abuse Referral   (577) 645-8247 (HELP)

## 2024-01-11 NOTE — LETTER
January 13, 2024      Elvin Trivedi  [address] [place] [address]        Dear ,    We are writing to inform you of your test results.    Your A1c looks good.  Your electrolytes, kidney and liver function and glucose tests look okay as well.  Your cholesterol, however, remains elevated.  I looked back at the time when you had much better looking cholesterol numbers.  You were on 10 mg of atorvastatin at the time.  You now have a prescription for 20 mg of rosuvastatin.  This medication should be stronger than a atorvastatin.  Perhaps you are not using it?  Could we use medication 1-2 times a week?  That would probably do the trick to get your cholesterol in a very good place.  Your other labs look good.  See you in 6 months.    Resulted Orders   Hemoglobin A1c   Result Value Ref Range    Hemoglobin A1C 6.1 (H) 0.0 - 5.6 %      Comment:      Normal <5.7%   Prediabetes 5.7-6.4%    Diabetes 6.5% or higher     Note: Adopted from ADA consensus guidelines.   Comprehensive metabolic panel (BMP + Alb, Alk Phos, ALT, AST, Total. Bili, TP)   Result Value Ref Range    Sodium 138 135 - 145 mmol/L      Comment:      Reference intervals for this test were updated on 09/26/2023 to more accurately reflect our healthy population. There may be differences in the flagging of prior results with similar values performed with this method. Interpretation of those prior results can be made in the context of the updated reference intervals.     Potassium 4.4 3.4 - 5.3 mmol/L    Carbon Dioxide (CO2) 25 22 - 29 mmol/L    Anion Gap 10 7 - 15 mmol/L    Urea Nitrogen 12.2 6.0 - 20.0 mg/dL    Creatinine 1.11 0.67 - 1.17 mg/dL    GFR Estimate 77 >60 mL/min/1.73m2    Calcium 9.7 8.6 - 10.0 mg/dL    Chloride 103 98 - 107 mmol/L    Glucose 113 (H) 70 - 99 mg/dL    Alkaline Phosphatase 65 40 - 150 U/L      Comment:      Reference intervals for this test were updated on 11/14/2023 to more accurately reflect our healthy population. There  may be differences in the flagging of prior results with similar values performed with this method. Interpretation of those prior results can be made in the context of the updated reference intervals.    AST 22 0 - 45 U/L      Comment:      Reference intervals for this test were updated on 6/12/2023 to more accurately reflect our healthy population. There may be differences in the flagging of prior results with similar values performed with this method. Interpretation of those prior results can be made in the context of the updated reference intervals.    ALT 38 0 - 70 U/L      Comment:      Reference intervals for this test were updated on 6/12/2023 to more accurately reflect our healthy population. There may be differences in the flagging of prior results with similar values performed with this method. Interpretation of those prior results can be made in the context of the updated reference intervals.      Protein Total 7.5 6.4 - 8.3 g/dL    Albumin 4.5 3.5 - 5.2 g/dL    Bilirubin Total 0.4 <=1.2 mg/dL   Lipid panel reflex to direct LDL Fasting   Result Value Ref Range    Cholesterol 208 (H) <200 mg/dL    Triglycerides 95 <150 mg/dL    Direct Measure HDL 56 >=40 mg/dL    LDL Cholesterol Calculated 133 (H) <=100 mg/dL    Non HDL Cholesterol 152 (H) <130 mg/dL    Patient Fasting > 8hrs? Unknown     Narrative    Cholesterol  Desirable:  <200 mg/dL    Triglycerides  Normal:  Less than 150 mg/dL  Borderline High:  150-199 mg/dL  High:  200-499 mg/dL  Very High:  Greater than or equal to 500 mg/dL    Direct Measure HDL  Female:  Greater than or equal to 50 mg/dL   Male:  Greater than or equal to 40 mg/dL    LDL Cholesterol  Desirable:  <100mg/dL  Above Desirable:  100-129 mg/dL   Borderline High:  130-159 mg/dL   High:  160-189 mg/dL   Very High:  >= 190 mg/dL    Non HDL Cholesterol  Desirable:  130 mg/dL  Above Desirable:  130-159 mg/dL  Borderline High:  160-189 mg/dL  High:  190-219 mg/dL  Very High:  Greater than or  equal to 220 mg/dL   PSA, screen   Result Value Ref Range    Prostate Specific Antigen Screen 2.71 0.00 - 3.50 ng/mL    Narrative    This result is obtained using the Roche Elecsys total PSA method on the mainor e801 immunoassay analyzer. Results obtained with different assay methods or kits cannot be used interchangeably.   CBC with platelets   Result Value Ref Range    WBC Count 8.0 4.0 - 11.0 10e3/uL    RBC Count 5.26 4.40 - 5.90 10e6/uL    Hemoglobin 16.6 13.3 - 17.7 g/dL    Hematocrit 48.2 40.0 - 53.0 %    MCV 92 78 - 100 fL    MCH 31.6 26.5 - 33.0 pg    MCHC 34.4 31.5 - 36.5 g/dL    RDW 12.5 10.0 - 15.0 %    Platelet Count 281 150 - 450 10e3/uL   TSH with free T4 reflex   Result Value Ref Range    TSH 2.40 0.30 - 4.20 uIU/mL       If you have any questions or concerns, please call the clinic at the number listed above.       Sincerely,      Ros Griffith MD

## 2024-01-12 LAB
ALBUMIN SERPL BCG-MCNC: 4.5 G/DL (ref 3.5–5.2)
ALP SERPL-CCNC: 65 U/L (ref 40–150)
ALT SERPL W P-5'-P-CCNC: 38 U/L (ref 0–70)
ANION GAP SERPL CALCULATED.3IONS-SCNC: 10 MMOL/L (ref 7–15)
AST SERPL W P-5'-P-CCNC: 22 U/L (ref 0–45)
BILIRUB SERPL-MCNC: 0.4 MG/DL
BUN SERPL-MCNC: 12.2 MG/DL (ref 6–20)
CALCIUM SERPL-MCNC: 9.7 MG/DL (ref 8.6–10)
CHLORIDE SERPL-SCNC: 103 MMOL/L (ref 98–107)
CHOLEST SERPL-MCNC: 208 MG/DL
CREAT SERPL-MCNC: 1.11 MG/DL (ref 0.67–1.17)
DEPRECATED HCO3 PLAS-SCNC: 25 MMOL/L (ref 22–29)
EGFRCR SERPLBLD CKD-EPI 2021: 77 ML/MIN/1.73M2
FASTING STATUS PATIENT QL REPORTED: ABNORMAL
GLUCOSE SERPL-MCNC: 113 MG/DL (ref 70–99)
HDLC SERPL-MCNC: 56 MG/DL
LDLC SERPL CALC-MCNC: 133 MG/DL
NONHDLC SERPL-MCNC: 152 MG/DL
POTASSIUM SERPL-SCNC: 4.4 MMOL/L (ref 3.4–5.3)
PROT SERPL-MCNC: 7.5 G/DL (ref 6.4–8.3)
PSA SERPL DL<=0.01 NG/ML-MCNC: 2.71 NG/ML (ref 0–3.5)
SODIUM SERPL-SCNC: 138 MMOL/L (ref 135–145)
TRIGL SERPL-MCNC: 95 MG/DL
TSH SERPL DL<=0.005 MIU/L-ACNC: 2.4 UIU/ML (ref 0.3–4.2)

## 2024-02-22 ENCOUNTER — PATIENT OUTREACH (OUTPATIENT)
Dept: GASTROENTEROLOGY | Facility: CLINIC | Age: 59
End: 2024-02-22
Payer: COMMERCIAL

## 2024-02-29 ENCOUNTER — MYC REFILL (OUTPATIENT)
Dept: FAMILY MEDICINE | Facility: CLINIC | Age: 59
End: 2024-02-29
Payer: COMMERCIAL

## 2024-02-29 DIAGNOSIS — F51.04 PSYCHOPHYSIOLOGICAL INSOMNIA: Chronic | ICD-10-CM

## 2024-02-29 DIAGNOSIS — F41.1 GENERALIZED ANXIETY DISORDER: Chronic | ICD-10-CM

## 2024-02-29 RX ORDER — LORAZEPAM 1 MG/1
1 TABLET ORAL EVERY 8 HOURS PRN
Qty: 10 TABLET | Refills: 0 | Status: SHIPPED | OUTPATIENT
Start: 2024-02-29 | End: 2024-04-03

## 2024-02-29 RX ORDER — HYDROXYZINE HYDROCHLORIDE 25 MG/1
TABLET, FILM COATED ORAL
Qty: 40 TABLET | Refills: 1 | Status: SHIPPED | OUTPATIENT
Start: 2024-02-29

## 2024-02-29 RX ORDER — HYDROXYZINE HYDROCHLORIDE 25 MG/1
TABLET, FILM COATED ORAL
Qty: 600 TABLET | Refills: 1 | Status: SHIPPED | OUTPATIENT
Start: 2024-02-29 | End: 2024-02-29

## 2024-03-04 DIAGNOSIS — F51.04 PSYCHOPHYSIOLOGICAL INSOMNIA: Chronic | ICD-10-CM

## 2024-03-04 RX ORDER — DOXEPIN HYDROCHLORIDE 50 MG/1
CAPSULE ORAL
Qty: 90 CAPSULE | Refills: 1 | Status: SHIPPED | OUTPATIENT
Start: 2024-03-04 | End: 2024-07-14

## 2024-04-01 DIAGNOSIS — F41.1 GENERALIZED ANXIETY DISORDER: Chronic | ICD-10-CM

## 2024-04-01 DIAGNOSIS — F33.41 RECURRENT MAJOR DEPRESSIVE DISORDER, IN PARTIAL REMISSION (H): Chronic | ICD-10-CM

## 2024-04-01 DIAGNOSIS — I10 BENIGN HYPERTENSION: Chronic | ICD-10-CM

## 2024-04-01 RX ORDER — FLUOXETINE 40 MG/1
CAPSULE ORAL
Qty: 90 CAPSULE | Refills: 0 | Status: SHIPPED | OUTPATIENT
Start: 2024-04-01 | End: 2024-07-11

## 2024-04-01 RX ORDER — LOSARTAN POTASSIUM 100 MG/1
100 TABLET ORAL DAILY
Qty: 90 TABLET | Refills: 1 | Status: SHIPPED | OUTPATIENT
Start: 2024-04-01

## 2024-04-03 ENCOUNTER — MYC REFILL (OUTPATIENT)
Dept: FAMILY MEDICINE | Facility: CLINIC | Age: 59
End: 2024-04-03
Payer: COMMERCIAL

## 2024-04-03 DIAGNOSIS — F51.04 PSYCHOPHYSIOLOGICAL INSOMNIA: Chronic | ICD-10-CM

## 2024-04-04 RX ORDER — LORAZEPAM 1 MG/1
1 TABLET ORAL EVERY 8 HOURS PRN
Qty: 30 TABLET | Refills: 0 | Status: SHIPPED | OUTPATIENT
Start: 2024-04-04 | End: 2024-07-17

## 2024-05-21 DIAGNOSIS — K21.9 GERD WITHOUT ESOPHAGITIS: Chronic | ICD-10-CM

## 2024-05-21 DIAGNOSIS — E78.2 MIXED HYPERLIPIDEMIA: Chronic | ICD-10-CM

## 2024-05-22 RX ORDER — ROSUVASTATIN CALCIUM 20 MG/1
20 TABLET, COATED ORAL DAILY
Qty: 90 TABLET | Refills: 1 | Status: SHIPPED | OUTPATIENT
Start: 2024-05-22

## 2024-05-22 RX ORDER — OMEPRAZOLE 40 MG/1
40 CAPSULE, DELAYED RELEASE ORAL DAILY
Qty: 90 CAPSULE | Refills: 1 | Status: SHIPPED | OUTPATIENT
Start: 2024-05-22

## 2024-05-27 DIAGNOSIS — F51.04 PSYCHOPHYSIOLOGICAL INSOMNIA: Chronic | ICD-10-CM

## 2024-05-28 RX ORDER — DOXEPIN HYDROCHLORIDE 150 MG/1
CAPSULE ORAL
Qty: 30 CAPSULE | Refills: 0 | Status: SHIPPED | OUTPATIENT
Start: 2024-05-28 | End: 2024-06-26

## 2024-06-26 DIAGNOSIS — F51.04 PSYCHOPHYSIOLOGICAL INSOMNIA: Chronic | ICD-10-CM

## 2024-06-26 RX ORDER — DOXEPIN HYDROCHLORIDE 150 MG/1
CAPSULE ORAL
Qty: 30 CAPSULE | Refills: 0 | Status: SHIPPED | OUTPATIENT
Start: 2024-06-26 | End: 2024-07-29

## 2024-07-11 ENCOUNTER — OFFICE VISIT (OUTPATIENT)
Dept: FAMILY MEDICINE | Facility: CLINIC | Age: 59
End: 2024-07-11
Payer: COMMERCIAL

## 2024-07-11 VITALS
WEIGHT: 168 LBS | SYSTOLIC BLOOD PRESSURE: 139 MMHG | TEMPERATURE: 98.6 F | HEIGHT: 68 IN | DIASTOLIC BLOOD PRESSURE: 88 MMHG | HEART RATE: 67 BPM | OXYGEN SATURATION: 95 % | RESPIRATION RATE: 16 BRPM | BODY MASS INDEX: 25.46 KG/M2

## 2024-07-11 DIAGNOSIS — E78.2 MIXED HYPERLIPIDEMIA: Chronic | ICD-10-CM

## 2024-07-11 DIAGNOSIS — Z23 IMMUNIZATION DUE: ICD-10-CM

## 2024-07-11 DIAGNOSIS — F33.41 RECURRENT MAJOR DEPRESSIVE DISORDER, IN PARTIAL REMISSION (H): Chronic | ICD-10-CM

## 2024-07-11 DIAGNOSIS — I10 BENIGN HYPERTENSION: Primary | Chronic | ICD-10-CM

## 2024-07-11 DIAGNOSIS — F51.04 PSYCHOPHYSIOLOGICAL INSOMNIA: Chronic | ICD-10-CM

## 2024-07-11 DIAGNOSIS — R73.03 PREDIABETES: ICD-10-CM

## 2024-07-11 DIAGNOSIS — F41.1 GENERALIZED ANXIETY DISORDER: Chronic | ICD-10-CM

## 2024-07-11 LAB
ANION GAP SERPL CALCULATED.3IONS-SCNC: 11 MMOL/L (ref 7–15)
BUN SERPL-MCNC: 8.6 MG/DL (ref 6–20)
CALCIUM SERPL-MCNC: 9.6 MG/DL (ref 8.6–10)
CHLORIDE SERPL-SCNC: 105 MMOL/L (ref 98–107)
CHOLEST SERPL-MCNC: 169 MG/DL
CK SERPL-CCNC: 74 U/L (ref 39–308)
CREAT SERPL-MCNC: 0.99 MG/DL (ref 0.67–1.17)
DEPRECATED HCO3 PLAS-SCNC: 23 MMOL/L (ref 22–29)
EGFRCR SERPLBLD CKD-EPI 2021: 89 ML/MIN/1.73M2
FASTING STATUS PATIENT QL REPORTED: ABNORMAL
FASTING STATUS PATIENT QL REPORTED: NORMAL
GLUCOSE SERPL-MCNC: 115 MG/DL (ref 70–99)
HDLC SERPL-MCNC: 48 MG/DL
LDLC SERPL CALC-MCNC: 93 MG/DL
NONHDLC SERPL-MCNC: 121 MG/DL
POTASSIUM SERPL-SCNC: 4.4 MMOL/L (ref 3.4–5.3)
SODIUM SERPL-SCNC: 139 MMOL/L (ref 135–145)
TRIGL SERPL-MCNC: 140 MG/DL

## 2024-07-11 PROCEDURE — 99214 OFFICE O/P EST MOD 30 MIN: CPT | Mod: 25 | Performed by: FAMILY MEDICINE

## 2024-07-11 PROCEDURE — 90750 HZV VACC RECOMBINANT IM: CPT | Performed by: FAMILY MEDICINE

## 2024-07-11 PROCEDURE — 90471 IMMUNIZATION ADMIN: CPT | Performed by: FAMILY MEDICINE

## 2024-07-11 PROCEDURE — 80048 BASIC METABOLIC PNL TOTAL CA: CPT | Performed by: FAMILY MEDICINE

## 2024-07-11 PROCEDURE — 80061 LIPID PANEL: CPT | Performed by: FAMILY MEDICINE

## 2024-07-11 PROCEDURE — 36415 COLL VENOUS BLD VENIPUNCTURE: CPT | Performed by: FAMILY MEDICINE

## 2024-07-11 PROCEDURE — 96127 BRIEF EMOTIONAL/BEHAV ASSMT: CPT | Performed by: FAMILY MEDICINE

## 2024-07-11 PROCEDURE — 82550 ASSAY OF CK (CPK): CPT | Performed by: FAMILY MEDICINE

## 2024-07-11 ASSESSMENT — PATIENT HEALTH QUESTIONNAIRE - PHQ9
10. IF YOU CHECKED OFF ANY PROBLEMS, HOW DIFFICULT HAVE THESE PROBLEMS MADE IT FOR YOU TO DO YOUR WORK, TAKE CARE OF THINGS AT HOME, OR GET ALONG WITH OTHER PEOPLE: SOMEWHAT DIFFICULT
SUM OF ALL RESPONSES TO PHQ QUESTIONS 1-9: 14
SUM OF ALL RESPONSES TO PHQ QUESTIONS 1-9: 14

## 2024-07-11 ASSESSMENT — ANXIETY QUESTIONNAIRES
4. TROUBLE RELAXING: SEVERAL DAYS
3. WORRYING TOO MUCH ABOUT DIFFERENT THINGS: SEVERAL DAYS
2. NOT BEING ABLE TO STOP OR CONTROL WORRYING: SEVERAL DAYS
5. BEING SO RESTLESS THAT IT IS HARD TO SIT STILL: SEVERAL DAYS
1. FEELING NERVOUS, ANXIOUS, OR ON EDGE: MORE THAN HALF THE DAYS
IF YOU CHECKED OFF ANY PROBLEMS ON THIS QUESTIONNAIRE, HOW DIFFICULT HAVE THESE PROBLEMS MADE IT FOR YOU TO DO YOUR WORK, TAKE CARE OF THINGS AT HOME, OR GET ALONG WITH OTHER PEOPLE: SOMEWHAT DIFFICULT
7. FEELING AFRAID AS IF SOMETHING AWFUL MIGHT HAPPEN: SEVERAL DAYS
GAD7 TOTAL SCORE: 8
7. FEELING AFRAID AS IF SOMETHING AWFUL MIGHT HAPPEN: SEVERAL DAYS
6. BECOMING EASILY ANNOYED OR IRRITABLE: SEVERAL DAYS
8. IF YOU CHECKED OFF ANY PROBLEMS, HOW DIFFICULT HAVE THESE MADE IT FOR YOU TO DO YOUR WORK, TAKE CARE OF THINGS AT HOME, OR GET ALONG WITH OTHER PEOPLE?: SOMEWHAT DIFFICULT

## 2024-07-11 ASSESSMENT — PAIN SCALES - GENERAL: PAINLEVEL: NO PAIN (0)

## 2024-07-11 NOTE — LETTER
July 12, 2024      Elvin Trivedi  [address]  [facility] [address]        Dear ,    We are writing to inform you of your test results.    Your glucose is mildly elevated but otherwise your labs look good.    Resulted Orders   Basic metabolic panel   Result Value Ref Range    Sodium 139 135 - 145 mmol/L    Potassium 4.4 3.4 - 5.3 mmol/L    Chloride 105 98 - 107 mmol/L    Carbon Dioxide (CO2) 23 22 - 29 mmol/L    Anion Gap 11 7 - 15 mmol/L    Urea Nitrogen 8.6 6.0 - 20.0 mg/dL    Creatinine 0.99 0.67 - 1.17 mg/dL    GFR Estimate 89 >60 mL/min/1.73m2      Comment:      eGFR calculated using 2021 CKD-EPI equation.    Calcium 9.6 8.6 - 10.0 mg/dL    Glucose 115 (H) 70 - 99 mg/dL    Patient Fasting > 8hrs? Unknown    Lipid panel reflex to direct LDL Fasting   Result Value Ref Range    Cholesterol 169 <200 mg/dL    Triglycerides 140 <150 mg/dL    Direct Measure HDL 48 >=40 mg/dL    LDL Cholesterol Calculated 93 <=100 mg/dL    Non HDL Cholesterol 121 <130 mg/dL    Patient Fasting > 8hrs? Unknown     Narrative    Cholesterol  Desirable:  <200 mg/dL    Triglycerides  Normal:  Less than 150 mg/dL  Borderline High:  150-199 mg/dL  High:  200-499 mg/dL  Very High:  Greater than or equal to 500 mg/dL    Direct Measure HDL  Female:  Greater than or equal to 50 mg/dL   Male:  Greater than or equal to 40 mg/dL    LDL Cholesterol  Desirable:  <100mg/dL  Above Desirable:  100-129 mg/dL   Borderline High:  130-159 mg/dL   High:  160-189 mg/dL   Very High:  >= 190 mg/dL    Non HDL Cholesterol  Desirable:  130 mg/dL  Above Desirable:  130-159 mg/dL  Borderline High:  160-189 mg/dL  High:  190-219 mg/dL  Very High:  Greater than or equal to 220 mg/dL   CK total   Result Value Ref Range    CK 74 39 - 308 U/L       If you have any questions or concerns, please call the clinic at the number listed above.       Sincerely,      Ros Griffith MD

## 2024-07-11 NOTE — PROGRESS NOTES
"  Assessment & Plan     Benign hypertension  His blood pressure today is marginal but he is currently on losartan 100 mg and Norvasc 5 mg and has not tolerated a higher dose of Norvasc.  He gets leg edema.  We will watch it for now.  Will check labs.  - Basic metabolic panel  - Basic metabolic panel    Mixed hyperlipidemia  Has very high cholesterol and is on rosuvastatin 20 mg and his last total cholesterol was 208 with an LDL of 133.  Complains of muscle aches which might be work-related.  Will check a CK just in case.  - Lipid panel reflex to direct LDL Fasting  - CK total  - Lipid panel reflex to direct LDL Fasting  - CK total    Prediabetes  Patient had an A1c of 6.1%.  He drinks sugared beverages and we discussed that he cut that all out his A1c would drop and his weight will drop.    Generalized anxiety disorder  Weaned himself off of his SSRI.  Has doxepin 150 mg at bedtime and hydroxyzine which he is using.  SILVIA-7 score today is 8.    Recurrent major depressive disorder, in partial remission (H24)  PHQ-9 score today is 14.  Despite this he has weaned himself off of his fluoxetine.  Does not want to start a new medication and declines BuSpar and bupropion.    Psychophysiological insomnia  Using doxepin 150 mg at bedtime.  Has not complained about insomnia recently.    Immunization due  Wishes to have his first shingles vaccine despite the fact that he feels as though he is having a cold coming on.  - ZOSTER RECOMBINANT ADJUVANTED (SHINGRIX)    I will get back to him on [facility] and only call with grossly abnormal values.  I will refill meds as needed.    BMI  Estimated body mass index is 25.92 kg/m  as calculated from the following:    Height as of this encounter: 1.715 m (5' 7.5\").    Weight as of this encounter: 76.2 kg (168 lb).     FUTURE APPOINTMENTS:       - Follow-up visit in 6 months for a complete physical.      Subjective   Elvin is a 58 year old, presenting for the following health " issues:  Recheck Medication (Fasting )        7/11/2024     9:38 AM   Additional Questions   Roomed by hermila babcock cma     History of Present Illness       Hypertension: He presents for follow up of hypertension.  He does not check blood pressure  regularly outside of the clinic. Outpatient blood pressures have not been over 140/90. He follows a low salt diet.     He eats 0-1 servings of fruits and vegetables daily.He consumes 1 sweetened beverage(s) daily.He exercises with enough effort to increase his heart rate 9 or less minutes per day.  He exercises with enough effort to increase his heart rate 3 or less days per week.   He is taking medications regularly.     Patient tells me that his wife will be retiring this fall and he gets his insurance through her.  He hopes he can stay with me.  However, they live in [place] but it is not that difficult for him because he is in town frequently for business.  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  He did have an episode of low back pain back and only may but was seen at the local clinic in [facility].  Patient has struggled with anxiety and depression.  He does not want to take SSRIs or SNRIs due to sexual side effects and so weaned himself off of his fluoxetine.  We discussed that there are other medications that can be taken such as BuSpar and bupropion.  He thinks he has been on bupropion before.  PHQ-9:      7/11/2024     5:58 AM   Last PHQ-9   1.  Little interest or pleasure in doing things 2   2.  Feeling down, depressed, or hopeless 2   3.  Trouble falling or staying asleep, or sleeping too much 2   4.  Feeling tired or having little energy 2   5.  Poor appetite or overeating 2   6.  Feeling bad about yourself 2   7.  Trouble concentrating 2   8.  Moving slowly or restless 0   Q9: Thoughts of better off dead/self-harm past 2 weeks 0   PHQ-9 Total Score 14       GAD7:      7/11/2024     9:35 AM   SILVIA-7  "   1. Feeling nervous, anxious, or on edge 2   2. Not being able to stop or control worrying 1   3. Worrying too much about different things 1   4. Trouble relaxing 1   5. Being so restless that it is hard to sit still 1   6. Becoming easily annoyed or irritable 1   7. Feeling afraid, as if something awful might happen 1   SILVIA-7 Total Score 8   If you checked any problems, how difficult have they made it for you to do your work, take care of things at home, or get along with other people? Somewhat difficult           Objective    /88   Pulse 67   Temp 98.6  F (37  C)   Resp 16   Ht 1.715 m (5' 7.5\")   Wt 76.2 kg (168 lb)   SpO2 95%   BMI 25.92 kg/m    Body mass index is 25.92 kg/m .  Physical Exam   GENERAL: healthy, alert, and no distress  EYES: Eyes grossly normal to inspection  HENT: nose and mouth without ulcers or lesions and oral mucous membranes moist  RESP: lungs clear to auscultation - no rales, rhonchi or wheezes  CV: regular rates and rhythm and without murmur  ABDOMEN: soft, nontender  MS: no gross musculoskeletal defects noted, no edema  PSYCH: mentation appears normal, affect flat, and anxious    Results for orders placed or performed in visit on 07/11/24   Basic metabolic panel     Status: Abnormal   Result Value Ref Range    Sodium 139 135 - 145 mmol/L    Potassium 4.4 3.4 - 5.3 mmol/L    Chloride 105 98 - 107 mmol/L    Carbon Dioxide (CO2) 23 22 - 29 mmol/L    Anion Gap 11 7 - 15 mmol/L    Urea Nitrogen 8.6 6.0 - 20.0 mg/dL    Creatinine 0.99 0.67 - 1.17 mg/dL    GFR Estimate 89 >60 mL/min/1.73m2    Calcium 9.6 8.6 - 10.0 mg/dL    Glucose 115 (H) 70 - 99 mg/dL    Patient Fasting > 8hrs? Unknown    Lipid panel reflex to direct LDL Fasting     Status: None   Result Value Ref Range    Cholesterol 169 <200 mg/dL    Triglycerides 140 <150 mg/dL    Direct Measure HDL 48 >=40 mg/dL    LDL Cholesterol Calculated 93 <=100 mg/dL    Non HDL Cholesterol 121 <130 mg/dL    Patient Fasting > 8hrs? " Unknown     Narrative    Cholesterol  Desirable:  <200 mg/dL    Triglycerides  Normal:  Less than 150 mg/dL  Borderline High:  150-199 mg/dL  High:  200-499 mg/dL  Very High:  Greater than or equal to 500 mg/dL    Direct Measure HDL  Female:  Greater than or equal to 50 mg/dL   Male:  Greater than or equal to 40 mg/dL    LDL Cholesterol  Desirable:  <100mg/dL  Above Desirable:  100-129 mg/dL   Borderline High:  130-159 mg/dL   High:  160-189 mg/dL   Very High:  >= 190 mg/dL    Non HDL Cholesterol  Desirable:  130 mg/dL  Above Desirable:  130-159 mg/dL  Borderline High:  160-189 mg/dL  High:  190-219 mg/dL  Very High:  Greater than or equal to 220 mg/dL   CK total     Status: Normal   Result Value Ref Range    CK 74 39 - 308 U/L           Signed Electronically by: Rso Griffith MD

## 2024-07-17 ENCOUNTER — MYC REFILL (OUTPATIENT)
Dept: FAMILY MEDICINE | Facility: CLINIC | Age: 59
End: 2024-07-17
Payer: COMMERCIAL

## 2024-07-17 DIAGNOSIS — F51.04 PSYCHOPHYSIOLOGICAL INSOMNIA: Chronic | ICD-10-CM

## 2024-07-18 RX ORDER — LORAZEPAM 1 MG/1
1 TABLET ORAL EVERY 8 HOURS PRN
Qty: 30 TABLET | Refills: 0 | Status: SHIPPED | OUTPATIENT
Start: 2024-07-18

## 2024-07-28 DIAGNOSIS — F51.04 PSYCHOPHYSIOLOGICAL INSOMNIA: Chronic | ICD-10-CM

## 2024-07-29 RX ORDER — DOXEPIN HYDROCHLORIDE 150 MG/1
CAPSULE ORAL
Qty: 90 CAPSULE | Refills: 0 | Status: SHIPPED | OUTPATIENT
Start: 2024-07-29

## 2024-07-31 DIAGNOSIS — F51.04 PSYCHOPHYSIOLOGICAL INSOMNIA: Chronic | ICD-10-CM

## 2024-07-31 RX ORDER — DOXEPIN HYDROCHLORIDE 150 MG/1
CAPSULE ORAL
Qty: 90 CAPSULE | Refills: 0 | OUTPATIENT
Start: 2024-07-31

## 2024-08-12 DIAGNOSIS — K21.9 GERD WITHOUT ESOPHAGITIS: Chronic | ICD-10-CM

## 2024-08-12 RX ORDER — OMEPRAZOLE 40 MG/1
40 CAPSULE, DELAYED RELEASE ORAL DAILY
Qty: 90 CAPSULE | Refills: 1 | OUTPATIENT
Start: 2024-08-12

## 2024-08-17 DIAGNOSIS — I10 BENIGN HYPERTENSION: ICD-10-CM

## 2024-08-19 RX ORDER — AMLODIPINE BESYLATE 5 MG/1
TABLET ORAL
Qty: 90 TABLET | Refills: 2 | Status: SHIPPED | OUTPATIENT
Start: 2024-08-19

## 2024-09-13 ENCOUNTER — TRANSFERRED RECORDS (OUTPATIENT)
Dept: HEALTH INFORMATION MANAGEMENT | Facility: CLINIC | Age: 59
End: 2024-09-13
Payer: COMMERCIAL

## 2024-09-16 NOTE — PROGRESS NOTES
"  SUBJECTIVE: Elvin Trivedi is a 54 y.o. White or  male who presents today for his 6-month med check.  He has hypertension which is well controlled.  He also has hyperlipidemia and is currently on atorvastatin 10 mg.  He says he is tolerating all his medicines.  He is also on fluoxetine 20 mg.  His PHQ 9 score today is 4 and his SILVIA 7 score is 6.  He tells me he is working a lot and feels as though he can never take a day off.  Sometimes he has insomnia when he has worries.  We have a CSA for half milligram alprazolam.  I give him 30 tablets at a time.  It usually takes him at least 2 months to go through them.  He will use them also for panic.  We also discussed that he should be very careful and make sure that he gets his flu shot this year because he has asplenia.  I had asked him previously to try to get records of his past immunizations.  Today I give him a list of the vaccines that he should be getting or should have had.  1 of them is the shingles vaccine which I know he has not had.  He calls his wife and she tells him that it is cheaper to get it in the pharmacy.  So their plan is to go to the pharmacy and at that time they can have their flu shot to perhaps.  I also mention that he looks like he is lost some weight.  And he says yes he has because he has cut down on what he is eating.  He says he is so busy at work he is not eating as much and he is definitely not going out for fast food as much.    OBJECTIVE: /82   Pulse 66   Resp 18   Ht 5' 7\" (1.702 m)   Wt 156 lb (70.8 kg)   SpO2 97%   BMI 24.43 kg/m    General: Well-appearing normal weight middle-aged male in no acute distress  Heart: Regular rate and rhythm without murmur  Lungs: Clear bilaterally  Abdomen: Soft, nontender  Extremities: Warm, dry and without edema  Psych: Patient seems very stable today.  Not as flat as previously.    ASSESSMENT & PLAN:     1. Benign hypertension  Well-controlled, will monitor labs.  - " Comprehensive Metabolic Panel    2. Hyperlipidemia, unspecified hyperlipidemia type  On Lipitor 10 mg will monitor lab work.  - Lipid Cascade FASTING    3. Generalized anxiety disorder  Mood seems better on fluoxetine 20.  PHQ 9 is 4, SILVIA 7 is 6.    4. Panic attacks  I have refilled his alprazolam.  Last fill was 7/24/2020.  - ALPRAZolam (XANAX) 0.5 MG tablet; TAKE 1 TABLET BY MOUTH EVERY DAY AS NEEDED FOR SLEEP OR ANXIETY  Dispense: 30 tablet; Refill: 0    5. Insomnia  - ALPRAZolam (XANAX) 0.5 MG tablet; TAKE 1 TABLET BY MOUTH EVERY DAY AS NEEDED FOR SLEEP OR ANXIETY  Dispense: 30 tablet; Refill: 0    6. Controlled substance agreement signed  Today we fill out a new CSA.    7. History of splenectomy  Patient was given the information on which immunizations he should make sure he has due to this condition.  He should make sure to have a flu shot and shingles vaccine as I know he has not had these.    8. Screen for colon cancer  Patient is behind on his colonoscopy.  I will have it [facility] call to set him up with colonoscopy.  - Ambulatory referral for Colonoscopy    9. Screening for prostate cancer  - PSA, Annual Screen (Prostatic-Specific Antigen)    I will get back to him on his lab results by my chart and only call with grossly abnormal values.  I will refill meds appropriately.  He is to get his immunizations.  If all is well he can see me back in 6 months time.    Patient Active Problem List   Diagnosis     Generalized anxiety disorder     History of splenectomy     Hyperlipidemia     GERD without esophagitis     Controlled substance agreement signed     Benign hypertension     Insomnia     Common wart     Moderate major depression (H)       Current Outpatient Medications on File Prior to Visit   Medication Sig Dispense Refill     atorvastatin (LIPITOR) 10 MG tablet TAKE 1 TABLET(10 MG) BY MOUTH DAILY 90 tablet 2     FLUoxetine (PROZAC) 20 MG capsule TAKE 1 CAPSULE(20 MG) BY MOUTH DAILY 90 capsule 2      Detail Level: Simple Instructions: This plan will send the code FBSE to the PM system.  DO NOT or CHANGE the price. losartan (COZAAR) 100 MG tablet TAKE 1 TABLET(100 MG) BY MOUTH DAILY 90 tablet 2     omeprazole (PRILOSEC) 40 MG capsule TAKE 1 CAPSULE(40 MG) BY MOUTH DAILY 90 capsule 1     [DISCONTINUED] ALPRAZolam (XANAX) 0.5 MG tablet TAKE 1 TABLET BY MOUTH EVERY DAY AS NEEDED FOR SLEEP OR ANXIETY 30 tablet 0     [DISCONTINUED] esomeprazole (NEXIUM) 40 MG capsule Take 1 capsule (40 mg total) by mouth daily. 90 capsule 1     No current facility-administered medications on file prior to visit.                   Price (Do Not Change): 0.00

## 2024-10-15 ENCOUNTER — MYC REFILL (OUTPATIENT)
Dept: FAMILY MEDICINE | Facility: CLINIC | Age: 59
End: 2024-10-15
Payer: COMMERCIAL

## 2024-10-15 DIAGNOSIS — F51.04 PSYCHOPHYSIOLOGICAL INSOMNIA: Chronic | ICD-10-CM

## 2024-10-15 RX ORDER — DOXEPIN HYDROCHLORIDE 150 MG/1
150 CAPSULE ORAL AT BEDTIME
Qty: 90 CAPSULE | Refills: 1 | Status: SHIPPED | OUTPATIENT
Start: 2024-10-15

## 2024-10-15 RX ORDER — LORAZEPAM 1 MG/1
1 TABLET ORAL EVERY 8 HOURS PRN
Qty: 30 TABLET | Refills: 0 | Status: SHIPPED | OUTPATIENT
Start: 2024-10-15

## 2024-10-19 DIAGNOSIS — I10 BENIGN HYPERTENSION: Chronic | ICD-10-CM

## 2024-10-21 RX ORDER — LOSARTAN POTASSIUM 100 MG/1
100 TABLET ORAL DAILY
Qty: 90 TABLET | Refills: 2 | Status: SHIPPED | OUTPATIENT
Start: 2024-10-21

## 2024-10-28 ENCOUNTER — MYC REFILL (OUTPATIENT)
Dept: FAMILY MEDICINE | Facility: CLINIC | Age: 59
End: 2024-10-28
Payer: COMMERCIAL

## 2024-10-28 DIAGNOSIS — K21.9 GERD WITHOUT ESOPHAGITIS: Chronic | ICD-10-CM

## 2024-10-29 RX ORDER — OMEPRAZOLE 40 MG/1
40 CAPSULE, DELAYED RELEASE ORAL DAILY
Qty: 90 CAPSULE | Refills: 1 | Status: SHIPPED | OUTPATIENT
Start: 2024-10-29

## 2024-11-15 DIAGNOSIS — E78.2 MIXED HYPERLIPIDEMIA: Chronic | ICD-10-CM

## 2024-11-15 RX ORDER — ROSUVASTATIN CALCIUM 20 MG/1
20 TABLET, COATED ORAL DAILY
Qty: 90 TABLET | Refills: 1 | Status: SHIPPED | OUTPATIENT
Start: 2024-11-15

## 2024-12-18 ENCOUNTER — PATIENT OUTREACH (OUTPATIENT)
Dept: GASTROENTEROLOGY | Facility: CLINIC | Age: 59
End: 2024-12-18
Payer: COMMERCIAL

## 2025-01-02 ENCOUNTER — MYC REFILL (OUTPATIENT)
Dept: FAMILY MEDICINE | Facility: CLINIC | Age: 60
End: 2025-01-02
Payer: COMMERCIAL

## 2025-01-02 DIAGNOSIS — F51.04 PSYCHOPHYSIOLOGICAL INSOMNIA: Chronic | ICD-10-CM

## 2025-01-02 RX ORDER — LORAZEPAM 1 MG/1
1 TABLET ORAL EVERY 8 HOURS PRN
Qty: 30 TABLET | Refills: 0 | Status: SHIPPED | OUTPATIENT
Start: 2025-01-02

## 2025-01-14 SDOH — HEALTH STABILITY: PHYSICAL HEALTH: ON AVERAGE, HOW MANY DAYS PER WEEK DO YOU ENGAGE IN MODERATE TO STRENUOUS EXERCISE (LIKE A BRISK WALK)?: 3 DAYS

## 2025-01-14 SDOH — HEALTH STABILITY: PHYSICAL HEALTH: ON AVERAGE, HOW MANY MINUTES DO YOU ENGAGE IN EXERCISE AT THIS LEVEL?: 30 MIN

## 2025-01-14 ASSESSMENT — ANXIETY QUESTIONNAIRES
IF YOU CHECKED OFF ANY PROBLEMS ON THIS QUESTIONNAIRE, HOW DIFFICULT HAVE THESE PROBLEMS MADE IT FOR YOU TO DO YOUR WORK, TAKE CARE OF THINGS AT HOME, OR GET ALONG WITH OTHER PEOPLE: VERY DIFFICULT
GAD7 TOTAL SCORE: 15
6. BECOMING EASILY ANNOYED OR IRRITABLE: NEARLY EVERY DAY
7. FEELING AFRAID AS IF SOMETHING AWFUL MIGHT HAPPEN: MORE THAN HALF THE DAYS
1. FEELING NERVOUS, ANXIOUS, OR ON EDGE: MORE THAN HALF THE DAYS
5. BEING SO RESTLESS THAT IT IS HARD TO SIT STILL: MORE THAN HALF THE DAYS
3. WORRYING TOO MUCH ABOUT DIFFERENT THINGS: NEARLY EVERY DAY
2. NOT BEING ABLE TO STOP OR CONTROL WORRYING: MORE THAN HALF THE DAYS
4. TROUBLE RELAXING: SEVERAL DAYS
GAD7 TOTAL SCORE: 15
GAD7 TOTAL SCORE: 15
7. FEELING AFRAID AS IF SOMETHING AWFUL MIGHT HAPPEN: MORE THAN HALF THE DAYS
8. IF YOU CHECKED OFF ANY PROBLEMS, HOW DIFFICULT HAVE THESE MADE IT FOR YOU TO DO YOUR WORK, TAKE CARE OF THINGS AT HOME, OR GET ALONG WITH OTHER PEOPLE?: VERY DIFFICULT

## 2025-01-14 ASSESSMENT — SOCIAL DETERMINANTS OF HEALTH (SDOH): HOW OFTEN DO YOU GET TOGETHER WITH FRIENDS OR RELATIVES?: TWICE A WEEK

## 2025-01-16 ENCOUNTER — OFFICE VISIT (OUTPATIENT)
Dept: FAMILY MEDICINE | Facility: CLINIC | Age: 60
End: 2025-01-16
Attending: FAMILY MEDICINE
Payer: COMMERCIAL

## 2025-01-16 VITALS
DIASTOLIC BLOOD PRESSURE: 82 MMHG | OXYGEN SATURATION: 95 % | HEART RATE: 92 BPM | TEMPERATURE: 98.3 F | BODY MASS INDEX: 26.53 KG/M2 | SYSTOLIC BLOOD PRESSURE: 133 MMHG | WEIGHT: 169 LBS | RESPIRATION RATE: 16 BRPM | HEIGHT: 67 IN

## 2025-01-16 DIAGNOSIS — F33.1 MODERATE EPISODE OF RECURRENT MAJOR DEPRESSIVE DISORDER (H): ICD-10-CM

## 2025-01-16 DIAGNOSIS — Z79.899 CONTROLLED SUBSTANCE AGREEMENT SIGNED: Chronic | ICD-10-CM

## 2025-01-16 DIAGNOSIS — Z71.6 ENCOUNTER FOR SMOKING CESSATION COUNSELING: ICD-10-CM

## 2025-01-16 DIAGNOSIS — Z12.5 SCREENING FOR PROSTATE CANCER: ICD-10-CM

## 2025-01-16 DIAGNOSIS — K21.9 GERD WITHOUT ESOPHAGITIS: Chronic | ICD-10-CM

## 2025-01-16 DIAGNOSIS — Z63.5 FAMILY DISRUPTION DUE TO DIVORCE OR LEGAL SEPARATION: ICD-10-CM

## 2025-01-16 DIAGNOSIS — R73.03 PREDIABETES: Chronic | ICD-10-CM

## 2025-01-16 DIAGNOSIS — F51.04 PSYCHOPHYSIOLOGICAL INSOMNIA: Chronic | ICD-10-CM

## 2025-01-16 DIAGNOSIS — E78.2 MIXED HYPERLIPIDEMIA: Chronic | ICD-10-CM

## 2025-01-16 DIAGNOSIS — I10 BENIGN HYPERTENSION: Chronic | ICD-10-CM

## 2025-01-16 DIAGNOSIS — Z00.00 ROUTINE GENERAL MEDICAL EXAMINATION AT A HEALTH CARE FACILITY: Primary | ICD-10-CM

## 2025-01-16 DIAGNOSIS — F41.1 GENERALIZED ANXIETY DISORDER: Chronic | ICD-10-CM

## 2025-01-16 LAB
EST. AVERAGE GLUCOSE BLD GHB EST-MCNC: 128 MG/DL
HBA1C MFR BLD: 6.1 % (ref 0–5.6)

## 2025-01-16 PROCEDURE — 80053 COMPREHEN METABOLIC PANEL: CPT | Performed by: FAMILY MEDICINE

## 2025-01-16 PROCEDURE — 36415 COLL VENOUS BLD VENIPUNCTURE: CPT | Performed by: FAMILY MEDICINE

## 2025-01-16 PROCEDURE — 80061 LIPID PANEL: CPT | Performed by: FAMILY MEDICINE

## 2025-01-16 PROCEDURE — G0103 PSA SCREENING: HCPCS | Performed by: FAMILY MEDICINE

## 2025-01-16 PROCEDURE — 83036 HEMOGLOBIN GLYCOSYLATED A1C: CPT | Performed by: FAMILY MEDICINE

## 2025-01-16 PROCEDURE — 99214 OFFICE O/P EST MOD 30 MIN: CPT | Mod: 25 | Performed by: FAMILY MEDICINE

## 2025-01-16 PROCEDURE — G2211 COMPLEX E/M VISIT ADD ON: HCPCS | Performed by: FAMILY MEDICINE

## 2025-01-16 PROCEDURE — 99396 PREV VISIT EST AGE 40-64: CPT | Performed by: FAMILY MEDICINE

## 2025-01-16 RX ORDER — ZOLPIDEM TARTRATE 10 MG/1
10 TABLET ORAL
Qty: 30 TABLET | Refills: 0 | Status: SHIPPED | OUTPATIENT
Start: 2025-01-16

## 2025-01-16 RX ORDER — LORAZEPAM 1 MG/1
1 TABLET ORAL EVERY 8 HOURS PRN
Qty: 20 TABLET | Refills: 0 | Status: SHIPPED | OUTPATIENT
Start: 2025-01-16

## 2025-01-16 RX ORDER — BUSPIRONE HYDROCHLORIDE 10 MG/1
10 TABLET ORAL 2 TIMES DAILY
Qty: 60 TABLET | Refills: 0 | Status: SHIPPED | OUTPATIENT
Start: 2025-01-16

## 2025-01-16 SDOH — SOCIAL STABILITY - SOCIAL INSECURITY: DISRUPTION OF FAMILY BY SEPARATION AND DIVORCE: Z63.5

## 2025-01-16 ASSESSMENT — PATIENT HEALTH QUESTIONNAIRE - PHQ9
10. IF YOU CHECKED OFF ANY PROBLEMS, HOW DIFFICULT HAVE THESE PROBLEMS MADE IT FOR YOU TO DO YOUR WORK, TAKE CARE OF THINGS AT HOME, OR GET ALONG WITH OTHER PEOPLE: VERY DIFFICULT
SUM OF ALL RESPONSES TO PHQ QUESTIONS 1-9: 20
SUM OF ALL RESPONSES TO PHQ QUESTIONS 1-9: 20

## 2025-01-16 ASSESSMENT — PAIN SCALES - GENERAL: PAINLEVEL_OUTOF10: NO PAIN (0)

## 2025-01-16 NOTE — LETTER
January 17, 2025      Elvin Trivedi  [address] [place] [address]        Dear ,    We are writing to inform you of your test results.    Your test results fall within the expected range(s) or remain unchanged from previous results.  Please continue with current treatment plan.    Resulted Orders   PSA, screen   Result Value Ref Range    Prostate Specific Antigen Screen 2.68 0.00 - 3.50 ng/mL    Narrative    This result is obtained using the Roche Elecsys total PSA method on the mainor e801 immunoassay analyzer, which is an ultrasensitive method. Results obtained with different assay methods or kits cannot be used interchangeably.  This test is intended for initial prostate cancer screening. PSA values exceeding the age-specific limits are suspicious for prostate disease, but additional testing, such as prostate biopsy, is needed to diagnose prostate pathology. The American Cancer Society recommends annual examination with digital rectal examination and serum PSA beginning at age 50 and for men with a life expectancy of at least 10 years after detection of prostate cancer. For men in high-risk groups, such as  Americans or men with a first-degree relative diagnosed at a younger age, testing should begin at a younger age. It is generally recommended that information be provided to patients about the benefits and limitations of testing and treatment so they can make informed decisions.   Lipid panel reflex to direct LDL Fasting   Result Value Ref Range    Cholesterol 133 <200 mg/dL    Triglycerides 79 <150 mg/dL    Direct Measure HDL 45 >=40 mg/dL    LDL Cholesterol Calculated 72 <100 mg/dL    Non HDL Cholesterol 88 <130 mg/dL    Patient Fasting > 8hrs? Yes     Narrative    Cholesterol  Desirable: < 200 mg/dL  Borderline High: 200 - 239 mg/dL  High: >= 240 mg/dL    Triglycerides  Normal: < 150 mg/dL  Borderline High: 150 - 199 mg/dL  High: 200-499 mg/dL  Very High: >= 500 mg/dL    Direct Measure  HDL  Female: >= 50 mg/dL   Male: >= 40 mg/dL    LDL Cholesterol  Desirable: < 100 mg/dL  Above Desirable: 100 - 129 mg/dL   Borderline High: 130 - 159 mg/dL   High:  160 - 189 mg/dL   Very High: >= 190 mg/dL    Non HDL Cholesterol  Desirable: < 130 mg/dL  Above Desirable: 130 - 159 mg/dL  Borderline High: 160 - 189 mg/dL  High: 190 - 219 mg/dL  Very High: >= 220 mg/dL   Comprehensive metabolic panel (BMP + Alb, Alk Phos, ALT, AST, Total. Bili, TP)   Result Value Ref Range    Sodium 140 135 - 145 mmol/L    Potassium 4.3 3.4 - 5.3 mmol/L    Carbon Dioxide (CO2) 23 22 - 29 mmol/L    Anion Gap 11 7 - 15 mmol/L    Urea Nitrogen 11.8 6.0 - 20.0 mg/dL    Creatinine 1.05 0.67 - 1.17 mg/dL    GFR Estimate 82 >60 mL/min/1.73m2      Comment:      eGFR calculated using 2021 CKD-EPI equation.    Calcium 9.9 8.8 - 10.4 mg/dL      Comment:      Reference intervals for this test were updated on 7/16/2024 to reflect our healthy population more accurately. There may be differences in the flagging of prior results with similar values performed with this method. Those prior results can be interpreted in the context of the updated reference intervals.    Chloride 106 98 - 107 mmol/L    Glucose 112 (H) 70 - 99 mg/dL    Alkaline Phosphatase 58 40 - 150 U/L    AST 17 0 - 45 U/L    ALT 24 0 - 70 U/L    Protein Total 7.1 6.4 - 8.3 g/dL    Albumin 4.2 3.5 - 5.2 g/dL    Bilirubin Total 0.5 <=1.2 mg/dL    Patient Fasting > 8hrs? Yes    Hemoglobin A1c   Result Value Ref Range    Estimated Average Glucose 128 (H) <117 mg/dL    Hemoglobin A1C 6.1 (H) 0.0 - 5.6 %      Comment:      Normal <5.7%   Prediabetes 5.7-6.4%    Diabetes 6.5% or higher     Note: Adopted from ADA consensus guidelines.       If you have any questions or concerns, please call the clinic at the number listed above.       Sincerely,      Ros Griffith MD    Electronically signed

## 2025-01-16 NOTE — PROGRESS NOTES
Preventive Care Visit  [facility]  Ros Griffith MD, Family Medicine  Jan 16, 2025      Assessment & Plan     Routine general medical examination at a health care facility  Last colonoscopy 9/13/2024 good for 5 years.  Eligible but declines COVID, flu, Shingrix, and pneumonia vaccines despite his history of splenectomy.  Declines lung cancer screening.  - PRIMARY CARE FOLLOW-UP SCHEDULING  - PRIMARY CARE FOLLOW-UP SCHEDULING    Family disruption due to divorce or legal separation  Has been  for 4 months from his wife of 20 years.    Moderate episode of recurrent major depressive disorder (H)  PHQ-9 score of 20 which is up from 14 six months ago.  Not sleeping well, angry, irritable.  Declines SSRI, SNRI.  Had previously been on Prozac which he discontinued early last year or slightly before.    Generalized anxiety disorder  Previously had used hydroxyzine as needed for anxiety but said it made him sleepy.  Tells me that lorazepam works well for him.  Especially when he has panic attacks.  Discussed tolerance to benzodiazepine medication.  Does not want to use bupropion because he would need to use it on a daily basis.  Seems willing to try buspirone although he is reluctant.  - busPIRone (BUSPAR) 10 MG tablet  Dispense: 60 tablet; Refill: 0    Psychophysiological insomnia  Complains that he is not sleeping at all and we discussed this is vital for his improvement in mood.  I had given him 20 of lorazepam monthly for sleep and panic with prior CSA.  I do not want to increase that.  He is also doing doxepin at bedtime which he does not want me to discontinue.  He feels it does not make him drowsy in the morning.  He had also previously been using diphenhydramine which of course made him drowsy in the morning as he would use overall 100 mg at bedtime.  I am willing to do a trial of zolpidem for him.  He does not remember having that before.  - zolpidem (AMBIEN) 10 MG tablet   "Dispense: 30 tablet; Refill: 0  - LORazepam (ATIVAN) 1 MG tablet  Dispense: 20 tablet; Refill: 0    Controlled substance agreement signed  New controlled substance agreement for lorazepam 1 mg 20 tablets in 30 days and Ambien 10 mg 30 tablets in 30 days was signed today.    Encounter for smoking cessation counseling  Patient has taken up smoking more.  He is out more often with friends and is drinking more and when he drinks he smokes.  Discussed the need to quit and he seems open to a referral to quit partner.  - Quit Partner (Tobacco Cessation) Referral    Benign hypertension  Controlled today on losartan 100 mg and Norvasc 5 mg.  - Comprehensive metabolic panel (BMP + Alb, Alk Phos, ALT, AST, Total. Bili, TP)  - Comprehensive metabolic panel (BMP + Alb, Alk Phos, ALT, AST, Total. Bili, TP)    Mixed hyperlipidemia  On rosuvastatin 20 mg.  Most recent LDL of 93.  Will recheck.  - Lipid panel reflex to direct LDL Fasting  - Lipid panel reflex to direct LDL Fasting    Prediabetes  Most recent A1c was 6.1% and prior to that 6.2 and 6.0.  Will recheck.  - Hemoglobin A1c  - Hemoglobin A1c    GERD without esophagitis  Continuing to need omeprazole for control.    Screening for prostate cancer  - PSA, screen  - PSA, screen      Patient has been advised of split billing requirements and indicates understanding: Yes    I will get back to him on lab results by MyChart or mail and only call with grossly abnormal values.  We discussed that we should probably follow-up with his new medications earlier than 6 months.  He will decide about that.    Nicotine/Tobacco Cessation  He reports that he has been smoking cigarettes. He has been exposed to tobacco smoke. He has never used smokeless tobacco.  Nicotine/Tobacco Cessation Plan  Phone counseling: Place order for Quit Partner Referral      BMI  Estimated body mass index is 26.87 kg/m  as calculated from the following:    Height as of this encounter: 1.689 m (5' 6.5\").    Weight " as of this encounter: 76.7 kg (169 lb).       Counseling  Appropriate preventive services were addressed with this patient via screening, questionnaire, or discussion as appropriate for fall prevention, nutrition, physical activity, Tobacco-use cessation, social engagement, weight loss and cognition.  Checklist reviewing preventive services available has been given to the patient.  Reviewed patient's diet, addressing concerns and/or questions.   He is at risk for lack of exercise and has been provided with information to increase physical activity for the benefit of his well-being.   The patient was instructed to see the dentist every 6 months.   He is at risk for psychosocial distress and has been provided with information to reduce risk.   The patient's PHQ-9 score is consistent with severe depression. He was provided with information regarding depression.         The longitudinal plan of care for the diagnosis(es)/condition(s) as documented were addressed during this visit. Due to the added complexity in care, I will continue to support Elvin in the subsequent management and with ongoing continuity of care.      FUTURE APPOINTMENTS:       - Follow-up med check in 6 months, and earlier hopefully as he needs follow-up for new medication given, perhaps in 2 to 3 months.      Subjective   Elvin is a 59 year old, presenting for the following:  Physical (Fasting )        1/16/2025     9:45 AM   Additional Questions   Roomed by hermila babcock cma        HPI  Patient tells me that he has been having more trouble with his mood and sleep.  He has been having marital troubles with his wife of 20 years.  The last 4 months have been extremely bad.  There is a lot of yelling and they cannot agree on anything and they do not talk.  He says it has been going on for a while but has intensified lately.  He said that he has had an anxiety attack and had to get some medication from his friend.  He and I have a controlled substance agreement  for 20 tablets of 1 mg lorazepam for 30 days.  This was mostly supposed to be for sleep but now he is asking me for more.  I told him I do not want to give him more, but that I am willing to give him Ambien to see if it is helpful.  He cannot remember if he has been on Ambien.  He knows that he has been on things for anxiety and mood in the past which she does not want to be back on.  He does not want to be on an SSRI.  We discussed doing something like buspirone.  He is not really thrilled about taking more medication but we discussed the dangers of having controlled substances on a regular basis such as tolerance, addiction.  The other thing he tells me is that he has gone back to smoking more regularly.  He notes that he is smoking mostly if he drinks alcohol.  He says he is not drinking too much alcohol but he is doing it more frequently than he had been.    Health Care Directive  Patient does not have a Health Care Directive: Discussed advance care planning with patient; however, patient declined at this time.      1/14/2025   General Health   How would you rate your overall physical health? (!) FAIR   Feel stress (tense, anxious, or unable to sleep) Rather much   (!) STRESS CONCERN      1/14/2025   Nutrition   Three or more servings of calcium each day? (!) NO   Diet: I don't know   How many servings of fruit and vegetables per day? (!) 2-3   How many sweetened beverages each day? 0-1         1/14/2025   Exercise   Days per week of moderate/strenous exercise 3 days   Average minutes spent exercising at this level 30 min         1/14/2025   Social Factors   Frequency of gathering with friends or relatives Twice a week   Worry food won't last until get money to buy more No   Food not last or not have enough money for food? No   Do you have housing? (Housing is defined as stable permanent housing and does not include staying ouside in a car, in a tent, in an abandoned building, in an overnight shelter, or  couch-surfing.) Yes   Are you worried about losing your housing? No   Lack of transportation? No   Unable to get utilities (heat,electricity)? No         1/14/2025   Fall Risk   Fallen 2 or more times in the past year? No   Trouble with walking or balance? No          1/14/2025   Dental   Dentist two times every year? (!) NO         1/14/2025   TB Screening   Were you born outside of the [place]? No       Today's PHQ-9 Score:       1/16/2025     9:43 AM   PHQ-9 SCORE   PHQ-9 Total Score MyChart 20 (Severe depression)   PHQ-9 Total Score 20        Patient-reported   PHQ-9:      1/16/2025     9:43 AM   Last PHQ-9   1.  Little interest or pleasure in doing things 3   2.  Feeling down, depressed, or hopeless 2   3.  Trouble falling or staying asleep, or sleeping too much 3   4.  Feeling tired or having little energy 3   5.  Poor appetite or overeating 3   6.  Feeling bad about yourself 2   7.  Trouble concentrating 2   8.  Moving slowly or restless 2   Q9: Thoughts of better off dead/self-harm past 2 weeks 0   PHQ-9 Total Score 20        Patient-reported       GAD7:      1/14/2025     3:54 PM   SILVIA-7    1. Feeling nervous, anxious, or on edge 2   2. Not being able to stop or control worrying 2   3. Worrying too much about different things 3   4. Trouble relaxing 1   5. Being so restless that it is hard to sit still 2   6. Becoming easily annoyed or irritable 3   7. Feeling afraid, as if something awful might happen 2   SILVIA-7 Total Score 15    If you checked any problems, how difficult have they made it for you to do your work, take care of things at home, or get along with other people? Very difficult       Patient-reported           1/14/2025   Substance Use   Alcohol more than 3/day or more than 7/wk No   Do you use any other substances recreationally? No     Social History     Tobacco Use    Smoking status: Some Days     Current packs/day: 0.00     Types: Cigarettes     Last attempt to quit: 8/10/2022     Years since  "quittin.4     Passive exposure: Current    Smokeless tobacco: Never    Tobacco comments:     Wife still smokes.   Vaping Use    Vaping status: Never Used   Substance Use Topics    Alcohol use: Yes     Comment: Alcoholic Drinks/day: 1 or 2 times a year    Drug use: Never           2025   STI Screening   New sexual partner(s) since last STI/HIV test? No   Last PSA:   Prostate Specific Antigen Screen   Date Value Ref Range Status   2025 2.68 0.00 - 3.50 ng/mL Final   2021 1.45 0.00 - 3.50 ug/L Final     ASCVD Risk   The 10-year ASCVD risk score (Tristen VIERA, et al., 2019) is: 11.1%    Values used to calculate the score:      Age: 59 years      Sex: Male      Is Non- : No      Diabetic: No      Tobacco smoker: Yes      Systolic Blood Pressure: 133 mmHg      Is BP treated: Yes      HDL Cholesterol: 45 mg/dL      Total Cholesterol: 133 mg/dL       Reviewed and updated as needed this visit by Provider                    Past Medical History:   Diagnosis Date    Ruptured appendix     Ruptured spleen      Past Surgical History:   Procedure Laterality Date    APPENDECTOMY      Ruptured    SPLENECTOMY, TOTAL      Fell while scaling a railing and ruptured spleen.    SURGICAL HISTORY OF -       spleen and appy    WISDOM TOOTH EXTRACTION       Labs reviewed in EPIC  BP Readings from Last 3 Encounters:   25 133/82   24 139/88   24 128/78    Wt Readings from Last 3 Encounters:   25 76.7 kg (169 lb)   24 76.2 kg (168 lb)   24 77.1 kg (170 lb)            Objective    Exam  /82 (BP Location: Right arm, Patient Position: Sitting, Cuff Size: Adult Regular)   Pulse 92   Temp 98.3  F (36.8  C)   Resp 16   Ht 1.689 m (5' 6.5\")   Wt 76.7 kg (169 lb)   SpO2 95%   BMI 26.87 kg/m     Estimated body mass index is 26.87 kg/m  as calculated from the following:    Height as of this encounter: 1.689 m (5' 6.5\").    Weight as of this " encounter: 76.7 kg (169 lb).    Physical Exam  General appearance - alert, well appearing, and in no distress, normal appearing weight, and anxious  Mental status - normal speech, dress, motor activity, and thought processes, depressed mood, anxious, agitated  Eyes - pupils equal and reactive, extraocular eye movements intact  Ears - bilateral TM's and external ear canals normal  Nose - normal and patent, no erythema, discharge or polyps  Mouth - mucous membranes moist, pharynx normal without lesions  Neck - supple, no significant adenopathy, carotids upstroke normal bilaterally, no bruits, thyroid exam: thyroid is normal in size without nodules or tenderness  Chest - clear to auscultation, no wheezes, rales or rhonchi, symmetric air entry  Heart - normal rate and regular rhythm, no murmurs noted  Abdomen - soft, nontender, nondistended, no masses or organomegaly   Male - not examined  Back exam - full range of motion, no tenderness, palpable spasm or pain on motion  Neurological - alert, oriented, normal speech, no focal findings or movement disorder noted, cranial nerves II through XII intact, DTR's normal and symmetric  Musculoskeletal - no joint tenderness, deformity or swelling  Extremities - peripheral pulses normal, no pedal edema, no clubbing or cyanosis  Skin - normal coloration and turgor, no rashes, no suspicious skin lesions noted    Recent Results (from the past 240 hours)   PSA, screen    Collection Time: 01/16/25 10:30 AM   Result Value Ref Range    Prostate Specific Antigen Screen 2.68 0.00 - 3.50 ng/mL   Lipid panel reflex to direct LDL Fasting    Collection Time: 01/16/25 10:30 AM   Result Value Ref Range    Cholesterol 133 <200 mg/dL    Triglycerides 79 <150 mg/dL    Direct Measure HDL 45 >=40 mg/dL    LDL Cholesterol Calculated 72 <100 mg/dL    Non HDL Cholesterol 88 <130 mg/dL    Patient Fasting > 8hrs? Yes    Comprehensive metabolic panel (BMP + Alb, Alk Phos, ALT, AST, Total. Bili, TP)     Collection Time: 01/16/25 10:30 AM   Result Value Ref Range    Sodium 140 135 - 145 mmol/L    Potassium 4.3 3.4 - 5.3 mmol/L    Carbon Dioxide (CO2) 23 22 - 29 mmol/L    Anion Gap 11 7 - 15 mmol/L    Urea Nitrogen 11.8 6.0 - 20.0 mg/dL    Creatinine 1.05 0.67 - 1.17 mg/dL    GFR Estimate 82 >60 mL/min/1.73m2    Calcium 9.9 8.8 - 10.4 mg/dL    Chloride 106 98 - 107 mmol/L    Glucose 112 (H) 70 - 99 mg/dL    Alkaline Phosphatase 58 40 - 150 U/L    AST 17 0 - 45 U/L    ALT 24 0 - 70 U/L    Protein Total 7.1 6.4 - 8.3 g/dL    Albumin 4.2 3.5 - 5.2 g/dL    Bilirubin Total 0.5 <=1.2 mg/dL    Patient Fasting > 8hrs? Yes    Hemoglobin A1c    Collection Time: 01/16/25 10:30 AM   Result Value Ref Range    Estimated Average Glucose 128 (H) <117 mg/dL    Hemoglobin A1C 6.1 (H) 0.0 - 5.6 %           Signed Electronically by: Ros Griffith MD    Answers submitted by the patient for this visit:  Patient Health Questionnaire (Submitted on 1/16/2025)  If you checked off any problems, how difficult have these problems made it for you to do your work, take care of things at home, or get along with other people?: Very difficult  PHQ9 TOTAL SCORE: 20  Patient Health Questionnaire (G7) (Submitted on 1/14/2025)  SILVIA 7 TOTAL SCORE: 15

## 2025-01-16 NOTE — LETTER
[facility]  01[facility]  Patient: Elvin Trivedi  YOB: 1965  Medical Record Number: [id number]                                                                                  Non-Opioid Controlled Substance Agreement    This is an agreement between you and your provider regarding safe and appropriate use of controlled substances prescribed by your care team. Controlled substances are?medicines that can cause physical and mental dependence (abuse).     There are strict laws about having and using these medicines. We here at [facility] are  committed to working with you in your efforts to get better. To support you in this work, we'll help you schedule regular office appointments for medicine refills. If we must cancel or change your appointment for any reason, we'll make sure you have enough medicine to last until your next appointment.     As a Provider, I will:   Listen carefully to your concerns while treating you with respect.   Recommend a treatment plan that I believe is in your best interest and may involve therapies other than medicine.    Talk with you often about the possible benefits and the risk of harm of any medicine that we prescribe for you.  Assess the safety of this medicine and check how well it works.    Provide a plan on how to taper (discontinue or go off) using this medicine if the decision is made to stop its use.      ::  As a Patient, I understand controlled substances:     Are prescribed by my care provider to help me function or work and manage my condition(s).?  Are strong medicines and can cause serious side effects.     Need to be taken exactly as prescribed.?Combining controlled substances with certain medicines or chemicals (such as illegal drugs, alcohol, sedatives, sleeping pills, and benzodiazepines) can be dangerous or even fatal.? If I stop taking my medicines suddenly, I may have severe withdrawal symptoms.     The risks,  benefits, and side effects of these medicine(s) were explained to me. I agree that:    I will take part in other treatments as advised by my care team. This may be psychiatry or counseling, physical therapy, behavioral therapy, group treatment or a referral to specialist.    I will keep all my appointments and understand this is part of the monitoring of controlled substances.?My care team may require an office visit for EVERY controlled substance refill. If I miss appointments or don t follow instructions, my care team may stop my medicine    I will take my medicines as prescribed. I will not change the dose or schedule unless my care team tells me to. There will be no refills if I run out early.      I may be asked to come to the clinic and complete a urine drug test or complete a pill count. If I don t give a urine sample or participate in a pill count, the care team may stop my medicine.    I will only receive controlled substance prescriptions from this clinic. If I am treated by another provider, I will tell them that I am taking controlled substances and that I have a treatment agreement with this provider. I will inform my [facility] care team within one business day if I am given a prescription for any controlled substance by another healthcare provider. My [facility] care team can contact other providers and pharmacists about my use of any medicines.    It is up to me to make sure that I don't run out of my medicines on weekends or holidays.?If my care team is willing to refill my prescription without a visit, I must request refills only during office hours. Refills may take up to 3 business days to process. I will use one pharmacy to fill all my controlled substance prescriptions. I will notify the clinic about any changes to my insurance or medicine availability.    I am responsible for my prescriptions. If the medicine/prescription is lost, stolen or destroyed, it will not be replaced.?I  also agree not to share controlled substance medicines with anyone.     I am aware I should not use any illegal or recreational drugs. I agree not to drink alcohol unless my care team says I can.     If I enroll in the [place] [facility] program, I will tell my care team before my next refill.    I will tell my care team right away if I become pregnant, have a new medical problem treated outside of my regular clinic, or have a change in my medicines.     I understand that this medicine can affect my thinking, judgment and reaction time.? Alcohol and drugs affect the brain and body, which can affect the safety of my driving. Being under the influence of alcohol or drugs can affect my decision-making, behaviors, personal safety and the safety of others. Driving while impaired (DWI) can occur if a person is driving, operating or in physical control of a car, motorcycle, boat, snowmobile, ATV, motorbike, off-road vehicle or any other motor vehicle ([place] Statute 169A.20). I understand the risk if I choose to drive or operate any vehicle or machinery.    I understand that if I do not follow any of the conditions above, my prescriptions or treatment may be stopped or changed.   I agree that my provider, clinic care team and pharmacy may work with any city, state or federal law enforcement agency that investigates the misuse, sale or other diversion of my controlled medicine. I will allow my provider to discuss my care with, or share a copy of, this agreement with any other treating provider, pharmacy or emergency room where I receive care.     I have read this agreement and have asked questions about anything I did not understand.    ________________________________________________________  Patient Signature - Elvin A Blomster     ___________________                   Date     ________________________________________________________  Provider Signature - Ros Griffith MD       ___________________                    Date     ________________________________________________________  Witness Signature (required if provider not present while patient signing)          ___________________                   Date

## 2025-01-16 NOTE — PATIENT INSTRUCTIONS
Patient Education   Preventive Care Advice   This is general advice given by our system to help you stay healthy. However, your care team may have specific advice just for you. Please talk to your care team about your preventive care needs.  Nutrition  Eat 5 or more servings of fruits and vegetables each day.  Try wheat bread, brown rice and whole grain pasta (instead of white bread, rice, and pasta).  Get enough calcium and vitamin D. Check the label on foods and aim for 100% of the RDA (recommended daily allowance).  Lifestyle  Exercise at least 150 minutes each week  (30 minutes a day, 5 days a week).  Do muscle strengthening activities 2 days a week. These help control your weight and prevent disease.  No smoking.  Wear sunscreen to prevent skin cancer.  Have a dental exam and cleaning every 6 months.  Yearly exams  See your health care team every year to talk about:  Any changes in your health.  Any medicines your care team has prescribed.  Preventive care, family planning, and ways to prevent chronic diseases.  Shots (vaccines)   HPV shots (up to age 26), if you've never had them before.  Hepatitis B shots (up to age 59), if you've never had them before.  COVID-19 shot: Get this shot when it's due.  Flu shot: Get a flu shot every year.  Tetanus shot: Get a tetanus shot every 10 years.  Pneumococcal, hepatitis A, and RSV shots: Ask your care team if you need these based on your risk.  Shingles shot (for age 50 and up)  General health tests  Diabetes screening:  Starting at age 35, Get screened for diabetes at least every 3 years.  If you are younger than age 35, ask your care team if you should be screened for diabetes.  Cholesterol test: At age 39, start having a cholesterol test every 5 years, or more often if advised.  Bone density scan (DEXA): At age 50, ask your care team if you should have this scan for osteoporosis (brittle bones).  Hepatitis C: Get tested at least once in your life.  STIs (sexually  transmitted infections)  Before age 24: Ask your care team if you should be screened for STIs.  After age 24: Get screened for STIs if you're at risk. You are at risk for STIs (including HIV) if:  You are sexually active with more than one person.  You don't use condoms every time.  You or a partner was diagnosed with a sexually transmitted infection.  If you are at risk for HIV, ask about PrEP medicine to prevent HIV.  Get tested for HIV at least once in your life, whether you are at risk for HIV or not.  Cancer screening tests  Cervical cancer screening: If you have a cervix, begin getting regular cervical cancer screening tests starting at age 21.  Breast cancer scan (mammogram): If you've ever had breasts, begin having regular mammograms starting at age 40. This is a scan to check for breast cancer.  Colon cancer screening: It is important to start screening for colon cancer at age 45.  Have a colonoscopy test every 10 years (or more often if you're at risk) Or, ask your provider about stool tests like a FIT test every year or Cologuard test every 3 years.  To learn more about your testing options, visit:   .  For help making a decision, visit:   https://bit.ly/ru17297.  Prostate cancer screening test: If you have a prostate, ask your care team if a prostate cancer screening test (PSA) at age 55 is right for you.  Lung cancer screening: If you are a current or former smoker ages 50 to 80, ask your care team if ongoing lung cancer screenings are right for you.  For informational purposes only. Not to replace the advice of your health care provider. Copyright   2023 [facility]. All rights reserved. Clinically reviewed by the [facility]. Cylande [id number] - REV 01/24.  Learning About Stress  What is stress?     Stress is your body's response to a hard situation. Your body can have a physical, emotional, or mental response. Stress is a fact of life for most people, and it  affects everyone differently. What causes stress for you may not be stressful for someone else.  A lot of things can cause stress. You may feel stress when you go on a job interview, take a test, or run a race. This kind of short-term stress is normal and even useful. It can help you if you need to work hard or react quickly. For example, stress can help you finish an important job on time.  Long-term stress is caused by ongoing stressful situations or events. Examples of long-term stress include long-term health problems, ongoing problems at work, or conflicts in your family. Long-term stress can harm your health.  How does stress affect your health?  When you are stressed, your body responds as though you are in danger. It makes hormones that speed up your heart, make you breathe faster, and give you a burst of energy. This is called the fight-or-flight stress response. If the stress is over quickly, your body goes back to normal and no harm is done.  But if stress happens too often or lasts too long, it can have bad effects. Long-term stress can make you more likely to get sick, and it can make symptoms of some diseases worse. If you tense up when you are stressed, you may develop neck, shoulder, or low back pain. Stress is linked to high blood pressure and heart disease.  Stress also harms your emotional health. It can make you fung, tense, or depressed. Your relationships may suffer, and you may not do well at work or school.  What can you do to manage stress?  You can try these things to help manage stress:   Do something active. Exercise or activity can help reduce stress. Walking is a great way to get started. Even everyday activities such as housecleaning or yard work can help.  Try yoga or nelson chi. These techniques combine exercise and meditation. You may need some training at first to learn them.  Do something you enjoy. For example, listen to music or go to a movie. Practice your hobby or do volunteer  "work.  Meditate. This can help you relax, because you are not worrying about what happened before or what may happen in the future.  Do guided imagery. Imagine yourself in any setting that helps you feel calm. You can use online videos, books, or a teacher to guide you.  Do breathing exercises. For example:  From a standing position, bend forward from the waist with your knees slightly bent. Let your arms dangle close to the floor.  Breathe in slowly and deeply as you return to a standing position. Roll up slowly and lift your head last.  Hold your breath for just a few seconds in the standing position.  Breathe out slowly and bend forward from the waist.  Let your feelings out. Talk, laugh, cry, and express anger when you need to. Talking with supportive friends or family, a counselor, or a codi leader about your feelings is a healthy way to relieve stress. Avoid discussing your feelings with people who make you feel worse.  Write. It may help to write about things that are bothering you. This helps you find out how much stress you feel and what is causing it. When you know this, you can find better ways to cope.  What can you do to prevent stress?  You might try some of these things to help prevent stress:  Manage your time. This helps you find time to do the things you want and need to do.  Get enough sleep. Your body recovers from the stresses of the day while you are sleeping.  Get support. Your family, friends, and community can make a difference in how you experience stress.  Limit your news feed. Avoid or limit time on social media or news that may make you feel stressed.  Do something active. Exercise or activity can help reduce stress. Walking is a great way to get started.  Where can you learn more?  Go to https://www.[employer].net/patiented  Enter N032 in the search box to learn more about \"Learning About Stress.\"  Current as of: October 24, 2023  Content Version: 14.3    2024 [employer]. "   Care instructions adapted under license by your healthcare professional. If you have questions about a medical condition or this instruction, always ask your healthcare professional. [employer] disclaims any warranty or liability for your use of this information.

## 2025-01-17 LAB
ALBUMIN SERPL BCG-MCNC: 4.2 G/DL (ref 3.5–5.2)
ALP SERPL-CCNC: 58 U/L (ref 40–150)
ALT SERPL W P-5'-P-CCNC: 24 U/L (ref 0–70)
ANION GAP SERPL CALCULATED.3IONS-SCNC: 11 MMOL/L (ref 7–15)
AST SERPL W P-5'-P-CCNC: 17 U/L (ref 0–45)
BILIRUB SERPL-MCNC: 0.5 MG/DL
BUN SERPL-MCNC: 11.8 MG/DL (ref 6–20)
CALCIUM SERPL-MCNC: 9.9 MG/DL (ref 8.8–10.4)
CHLORIDE SERPL-SCNC: 106 MMOL/L (ref 98–107)
CHOLEST SERPL-MCNC: 133 MG/DL
CREAT SERPL-MCNC: 1.05 MG/DL (ref 0.67–1.17)
EGFRCR SERPLBLD CKD-EPI 2021: 82 ML/MIN/1.73M2
FASTING STATUS PATIENT QL REPORTED: YES
FASTING STATUS PATIENT QL REPORTED: YES
GLUCOSE SERPL-MCNC: 112 MG/DL (ref 70–99)
HCO3 SERPL-SCNC: 23 MMOL/L (ref 22–29)
HDLC SERPL-MCNC: 45 MG/DL
LDLC SERPL CALC-MCNC: 72 MG/DL
NONHDLC SERPL-MCNC: 88 MG/DL
POTASSIUM SERPL-SCNC: 4.3 MMOL/L (ref 3.4–5.3)
PROT SERPL-MCNC: 7.1 G/DL (ref 6.4–8.3)
PSA SERPL DL<=0.01 NG/ML-MCNC: 2.68 NG/ML (ref 0–3.5)
SODIUM SERPL-SCNC: 140 MMOL/L (ref 135–145)
TRIGL SERPL-MCNC: 79 MG/DL

## 2025-01-18 PROBLEM — F33.1 MODERATE EPISODE OF RECURRENT MAJOR DEPRESSIVE DISORDER (H): Status: ACTIVE | Noted: 2018-09-03

## 2025-01-18 PROBLEM — F33.1 MODERATE EPISODE OF RECURRENT MAJOR DEPRESSIVE DISORDER (H): Chronic | Status: ACTIVE | Noted: 2018-09-03

## 2025-01-22 ENCOUNTER — MYC REFILL (OUTPATIENT)
Dept: FAMILY MEDICINE | Facility: CLINIC | Age: 60
End: 2025-01-22
Payer: COMMERCIAL

## 2025-01-22 DIAGNOSIS — E78.2 MIXED HYPERLIPIDEMIA: Chronic | ICD-10-CM

## 2025-01-22 DIAGNOSIS — K21.9 GERD WITHOUT ESOPHAGITIS: Chronic | ICD-10-CM

## 2025-01-22 RX ORDER — OMEPRAZOLE 40 MG/1
40 CAPSULE, DELAYED RELEASE ORAL DAILY
Qty: 90 CAPSULE | Refills: 1 | Status: SHIPPED | OUTPATIENT
Start: 2025-01-22

## 2025-01-22 RX ORDER — ROSUVASTATIN CALCIUM 20 MG/1
20 TABLET, COATED ORAL DAILY
Qty: 90 TABLET | Refills: 1 | Status: SHIPPED | OUTPATIENT
Start: 2025-01-22

## 2025-02-16 ENCOUNTER — MYC REFILL (OUTPATIENT)
Dept: FAMILY MEDICINE | Facility: CLINIC | Age: 60
End: 2025-02-16
Payer: COMMERCIAL

## 2025-02-16 DIAGNOSIS — I10 BENIGN HYPERTENSION: Chronic | ICD-10-CM

## 2025-02-16 DIAGNOSIS — I10 BENIGN HYPERTENSION: ICD-10-CM

## 2025-02-16 DIAGNOSIS — F51.04 PSYCHOPHYSIOLOGICAL INSOMNIA: Chronic | ICD-10-CM

## 2025-02-17 ENCOUNTER — PATIENT OUTREACH (OUTPATIENT)
Dept: CARE COORDINATION | Facility: CLINIC | Age: 60
End: 2025-02-17
Payer: COMMERCIAL

## 2025-02-17 RX ORDER — DOXEPIN HYDROCHLORIDE 150 MG/1
150 CAPSULE ORAL AT BEDTIME
Qty: 90 CAPSULE | Refills: 2 | Status: SHIPPED | OUTPATIENT
Start: 2025-02-17

## 2025-02-17 RX ORDER — ZOLPIDEM TARTRATE 10 MG/1
10 TABLET ORAL
Qty: 30 TABLET | Refills: 0 | Status: SHIPPED | OUTPATIENT
Start: 2025-02-17

## 2025-02-17 RX ORDER — LOSARTAN POTASSIUM 100 MG/1
100 TABLET ORAL DAILY
Qty: 90 TABLET | Refills: 2 | Status: SHIPPED | OUTPATIENT
Start: 2025-02-17

## 2025-02-17 RX ORDER — LORAZEPAM 1 MG/1
1 TABLET ORAL EVERY 8 HOURS PRN
Qty: 20 TABLET | Refills: 0 | Status: SHIPPED | OUTPATIENT
Start: 2025-02-17

## 2025-02-17 RX ORDER — AMLODIPINE BESYLATE 5 MG/1
5 TABLET ORAL DAILY
Qty: 90 TABLET | Refills: 2 | Status: SHIPPED | OUTPATIENT
Start: 2025-02-17

## 2025-03-19 ASSESSMENT — ANXIETY QUESTIONNAIRES
GAD7 TOTAL SCORE: 7
GAD7 TOTAL SCORE: 7
1. FEELING NERVOUS, ANXIOUS, OR ON EDGE: SEVERAL DAYS
4. TROUBLE RELAXING: SEVERAL DAYS
2. NOT BEING ABLE TO STOP OR CONTROL WORRYING: SEVERAL DAYS
5. BEING SO RESTLESS THAT IT IS HARD TO SIT STILL: SEVERAL DAYS
GAD7 TOTAL SCORE: 7
7. FEELING AFRAID AS IF SOMETHING AWFUL MIGHT HAPPEN: NOT AT ALL
IF YOU CHECKED OFF ANY PROBLEMS ON THIS QUESTIONNAIRE, HOW DIFFICULT HAVE THESE PROBLEMS MADE IT FOR YOU TO DO YOUR WORK, TAKE CARE OF THINGS AT HOME, OR GET ALONG WITH OTHER PEOPLE: SOMEWHAT DIFFICULT
6. BECOMING EASILY ANNOYED OR IRRITABLE: MORE THAN HALF THE DAYS
7. FEELING AFRAID AS IF SOMETHING AWFUL MIGHT HAPPEN: NOT AT ALL
8. IF YOU CHECKED OFF ANY PROBLEMS, HOW DIFFICULT HAVE THESE MADE IT FOR YOU TO DO YOUR WORK, TAKE CARE OF THINGS AT HOME, OR GET ALONG WITH OTHER PEOPLE?: SOMEWHAT DIFFICULT
3. WORRYING TOO MUCH ABOUT DIFFERENT THINGS: SEVERAL DAYS

## 2025-03-19 ASSESSMENT — PATIENT HEALTH QUESTIONNAIRE - PHQ9
SUM OF ALL RESPONSES TO PHQ QUESTIONS 1-9: 7
10. IF YOU CHECKED OFF ANY PROBLEMS, HOW DIFFICULT HAVE THESE PROBLEMS MADE IT FOR YOU TO DO YOUR WORK, TAKE CARE OF THINGS AT HOME, OR GET ALONG WITH OTHER PEOPLE: SOMEWHAT DIFFICULT
SUM OF ALL RESPONSES TO PHQ QUESTIONS 1-9: 7

## 2025-03-20 ENCOUNTER — OFFICE VISIT (OUTPATIENT)
Dept: FAMILY MEDICINE | Facility: CLINIC | Age: 60
End: 2025-03-20
Attending: FAMILY MEDICINE
Payer: COMMERCIAL

## 2025-03-20 VITALS
RESPIRATION RATE: 16 BRPM | WEIGHT: 167 LBS | OXYGEN SATURATION: 95 % | HEIGHT: 67 IN | TEMPERATURE: 98 F | HEART RATE: 87 BPM | SYSTOLIC BLOOD PRESSURE: 118 MMHG | DIASTOLIC BLOOD PRESSURE: 78 MMHG | BODY MASS INDEX: 26.21 KG/M2

## 2025-03-20 DIAGNOSIS — Z63.5 FAMILY DISRUPTION DUE TO DIVORCE OR LEGAL SEPARATION: ICD-10-CM

## 2025-03-20 DIAGNOSIS — F41.1 GENERALIZED ANXIETY DISORDER: Chronic | ICD-10-CM

## 2025-03-20 DIAGNOSIS — F33.1 MODERATE EPISODE OF RECURRENT MAJOR DEPRESSIVE DISORDER (H): Primary | Chronic | ICD-10-CM

## 2025-03-20 DIAGNOSIS — F51.04 PSYCHOPHYSIOLOGICAL INSOMNIA: Chronic | ICD-10-CM

## 2025-03-20 DIAGNOSIS — Z79.899 CONTROLLED SUBSTANCE AGREEMENT SIGNED: Chronic | ICD-10-CM

## 2025-03-20 DIAGNOSIS — M75.52 DELTOID BURSITIS, LEFT: ICD-10-CM

## 2025-03-20 PROCEDURE — G2211 COMPLEX E/M VISIT ADD ON: HCPCS | Performed by: FAMILY MEDICINE

## 2025-03-20 PROCEDURE — 99214 OFFICE O/P EST MOD 30 MIN: CPT | Performed by: FAMILY MEDICINE

## 2025-03-20 RX ORDER — LORAZEPAM 1 MG/1
1 TABLET ORAL EVERY 8 HOURS PRN
Qty: 20 TABLET | Refills: 0 | Status: SHIPPED | OUTPATIENT
Start: 2025-03-20

## 2025-03-20 SDOH — SOCIAL STABILITY - SOCIAL INSECURITY: DISRUPTION OF FAMILY BY SEPARATION AND DIVORCE: Z63.5

## 2025-03-20 ASSESSMENT — PAIN SCALES - GENERAL: PAINLEVEL_OUTOF10: SEVERE PAIN (7)

## 2025-03-20 NOTE — PROGRESS NOTES
Assessment & Plan     Moderate episode of recurrent major depressive disorder (H)  Patient had previously been on depression meds but had weaned himself off.  Then I saw him in January and started him on BuSpar 10 mg twice daily because he had a PHQ-9 score of 20.  His PHQ-9 score today is 7.  Unfortunately he did not tolerate the medication so is not taking it.  He is too busy to do therapy at this point, but he says he is better because he and his wife are not moving forward with any final decisions at this point.    Generalized anxiety disorder  Has a lot of anxiety around marriage, money, new business.  There are a lot of stressors.  SILVIA-7 score today is 7 which is improved from previous 15 back in January.    Family disruption due to divorce or legal separation  This has been put on hold which has taken off the stress for him.  He remains on her health insurance.    Psychophysiological insomnia  Patient would like a refill of lorazepam which he usually takes more for panic than for sleep.  He has doxepin and zolpidem for sleep.  - LORazepam (ATIVAN) 1 MG tablet  Dispense: 20 tablet; Refill: 0    Controlled substance agreement signed  Done 1/16/2025.    Deltoid bursitis, left  Patient describes what I think is bursitis.  Declines physical therapy.  Discussed cortisone/steroid.  Wants to try tizanidine.  Will use Tylenol/ibuprofen/heat.  - tiZANidine (ZANAFLEX) 4 MG tablet  Dispense: 30 tablet; Refill: 1    He will let me know if he decides he wants to go to therapy, mental health or physical.    The longitudinal plan of care for the diagnosis(es)/condition(s) as documented were addressed during this visit. Due to the added complexity in care, I will continue to support Elvin in the subsequent management and with ongoing continuity of care.        FUTURE APPOINTMENTS:       - Follow-up visit in July for his 6-month med check, sooner if needed.      Subjective   Elvin is a 59 year old, presenting for the following  health issues:  Recheck Medication and Arm Pain (No known injury . Left upper arm muscle pain last 3 days )        3/20/2025    12:06 PM   Additional Questions   Roomed by hermila babcock cma     History of Present Illness       Mental Health Follow-up:  Patient presents to follow-up on Depression & Anxiety.Patient's depression since last visit has been:  Medium  The patient is not having other symptoms associated with depression.  Patient's anxiety since last visit has been:  Medium  The patient is not having other symptoms associated with anxiety.  Any significant life events: No  Patient is feeling anxious or having panic attacks.  Patient has no concerns about alcohol or drug use.    Hypertension: He presents for follow up of hypertension.  He does not check blood pressure  regularly outside of the clinic. Outpatient blood pressures have not been over 140/90. He does not follow a low salt diet.     He eats 0-1 servings of fruits and vegetables daily.He consumes 1 sweetened beverage(s) daily.He exercises with enough effort to increase his heart rate 9 or less minutes per day.  He exercises with enough effort to increase his heart rate 3 or less days per week.   He is taking medications regularly.      I last saw this patient on 1/16/2025 and at that time he was having no worsening of his depression and anxiety.  He was having some marital issues and there was talk of separation etc.  I had started him on buspirone for anxiety as he did not want any medication that would cause weight gain.  He tells me he had hours of vertigo after taking it.  He did not want anything to do with it.  He is thinking about getting a punching bag to get out his frustration etc.  It would be good exercise.  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  Money is an issue.  Health insurance is an issue.  Sleep has  always been an issue for him.  He has been on doxepin, zolpidem and at times lorazepam.  We did a new controlled substance agreement at our last appointment.   He also has a new problem today and that his left upper arm is hurting him.  Not in the joint but on the front of his upper arm.  We discussed conservative treatment with it and that it is probably deltoid bursitis.  I could send him to physical therapy if he were willing.  If it does not seem to be improving with time there may even be steroid that might be helpful.  He thinks perhaps a muscle relaxer would be helpful and would like to try that especially at bedtime.   He also tells me that he tried to make this a video visit but somehow could not get that scheduled and so had to drive an hour and a half to see me.  PHQ-9:      3/19/2025     2:38 PM   Last PHQ-9   1.  Little interest or pleasure in doing things 1   2.  Feeling down, depressed, or hopeless 1   3.  Trouble falling or staying asleep, or sleeping too much 1   4.  Feeling tired or having little energy 1   5.  Poor appetite or overeating 1   6.  Feeling bad about yourself 1   7.  Trouble concentrating 1   8.  Moving slowly or restless 0   Q9: Thoughts of better off dead/self-harm past 2 weeks 0   PHQ-9 Total Score 7        Patient-reported       GAD7:      3/19/2025     2:41 PM   SILVIA-7    1. Feeling nervous, anxious, or on edge 1   2. Not being able to stop or control worrying 1   3. Worrying too much about different things 1   4. Trouble relaxing 1   5. Being so restless that it is hard to sit still 1   6. Becoming easily annoyed or irritable 2   7. Feeling afraid, as if something awful might happen 0   SILVIA-7 Total Score 7    If you checked any problems, how difficult have they made it for you to do your work, take care of things at home, or get along with other people? Somewhat difficult       Patient-reported           Objective    /78   Pulse 87   Temp 98  F (36.7  C)   Resp 16   Ht  "1.689 m (5' 6.5\")   Wt 75.8 kg (167 lb)   SpO2 95%   BMI 26.55 kg/m    Body mass index is 26.55 kg/m .  Physical Exam   GENERAL: healthy, alert, no distress, and normal weight  RESP: lungs clear to auscultation - no rales, rhonchi or wheezes  CV: regular rates and rhythm  ABDOMEN: soft, nontender  MS: no gross musculoskeletal defects noted, no edema  PSYCH: mentation appears normal, affect mildly flat, and anxious            Signed Electronically by: Ros Griffith MD    "

## 2025-04-23 ENCOUNTER — MYC REFILL (OUTPATIENT)
Dept: FAMILY MEDICINE | Facility: CLINIC | Age: 60
End: 2025-04-23
Payer: COMMERCIAL

## 2025-04-23 DIAGNOSIS — F51.04 PSYCHOPHYSIOLOGICAL INSOMNIA: Chronic | ICD-10-CM

## 2025-04-24 RX ORDER — LORAZEPAM 1 MG/1
1 TABLET ORAL EVERY 8 HOURS PRN
Qty: 20 TABLET | Refills: 0 | Status: SHIPPED | OUTPATIENT
Start: 2025-04-24

## 2025-04-24 RX ORDER — ZOLPIDEM TARTRATE 10 MG/1
10 TABLET ORAL
Qty: 30 TABLET | Refills: 0 | Status: SHIPPED | OUTPATIENT
Start: 2025-04-24

## 2025-06-08 ENCOUNTER — MYC REFILL (OUTPATIENT)
Dept: FAMILY MEDICINE | Facility: CLINIC | Age: 60
End: 2025-06-08
Payer: COMMERCIAL

## 2025-06-08 DIAGNOSIS — F51.04 PSYCHOPHYSIOLOGICAL INSOMNIA: Chronic | ICD-10-CM

## 2025-06-09 RX ORDER — ZOLPIDEM TARTRATE 10 MG/1
10 TABLET ORAL
Qty: 30 TABLET | Refills: 0 | Status: SHIPPED | OUTPATIENT
Start: 2025-06-09

## 2025-06-09 RX ORDER — LORAZEPAM 1 MG/1
1 TABLET ORAL EVERY 8 HOURS PRN
Qty: 20 TABLET | Refills: 0 | Status: SHIPPED | OUTPATIENT
Start: 2025-06-09

## 2025-06-12 ENCOUNTER — MYC REFILL (OUTPATIENT)
Dept: FAMILY MEDICINE | Facility: CLINIC | Age: 60
End: 2025-06-12
Payer: COMMERCIAL

## 2025-06-12 DIAGNOSIS — K21.9 GERD WITHOUT ESOPHAGITIS: Chronic | ICD-10-CM

## 2025-06-12 RX ORDER — OMEPRAZOLE 40 MG/1
40 CAPSULE, DELAYED RELEASE ORAL DAILY
Qty: 90 CAPSULE | Refills: 0 | OUTPATIENT
Start: 2025-06-12

## 2025-06-12 RX ORDER — OMEPRAZOLE 40 MG/1
40 CAPSULE, DELAYED RELEASE ORAL DAILY
Qty: 90 CAPSULE | Refills: 1 | OUTPATIENT
Start: 2025-06-12

## 2025-07-12 DIAGNOSIS — E78.2 MIXED HYPERLIPIDEMIA: Chronic | ICD-10-CM

## 2025-07-14 ENCOUNTER — MYC REFILL (OUTPATIENT)
Dept: FAMILY MEDICINE | Facility: CLINIC | Age: 60
End: 2025-07-14
Payer: COMMERCIAL

## 2025-07-14 DIAGNOSIS — K21.9 GERD WITHOUT ESOPHAGITIS: Chronic | ICD-10-CM

## 2025-07-14 DIAGNOSIS — F51.04 PSYCHOPHYSIOLOGICAL INSOMNIA: Chronic | ICD-10-CM

## 2025-07-14 RX ORDER — ROSUVASTATIN CALCIUM 20 MG/1
20 TABLET, COATED ORAL DAILY
Qty: 90 TABLET | Refills: 1 | Status: SHIPPED | OUTPATIENT
Start: 2025-07-14

## 2025-07-14 RX ORDER — OMEPRAZOLE 40 MG/1
40 CAPSULE, DELAYED RELEASE ORAL DAILY
Qty: 30 CAPSULE | Refills: 4 | Status: SHIPPED | OUTPATIENT
Start: 2025-07-14

## 2025-07-15 RX ORDER — ZOLPIDEM TARTRATE 10 MG/1
10 TABLET ORAL
Qty: 30 TABLET | Refills: 0 | Status: SHIPPED | OUTPATIENT
Start: 2025-07-15

## 2025-07-15 RX ORDER — LORAZEPAM 1 MG/1
1 TABLET ORAL EVERY 8 HOURS PRN
Qty: 20 TABLET | Refills: 0 | Status: SHIPPED | OUTPATIENT
Start: 2025-07-15

## 2025-08-16 ENCOUNTER — MYC REFILL (OUTPATIENT)
Dept: FAMILY MEDICINE | Facility: CLINIC | Age: 60
End: 2025-08-16
Payer: COMMERCIAL

## 2025-08-16 DIAGNOSIS — F51.04 PSYCHOPHYSIOLOGICAL INSOMNIA: Chronic | ICD-10-CM

## 2025-08-18 RX ORDER — ZOLPIDEM TARTRATE 10 MG/1
10 TABLET ORAL
Qty: 30 TABLET | Refills: 0 | Status: SHIPPED | OUTPATIENT
Start: 2025-08-18

## 2025-08-18 RX ORDER — LORAZEPAM 1 MG/1
1 TABLET ORAL EVERY 8 HOURS PRN
Qty: 20 TABLET | Refills: 0 | Status: SHIPPED | OUTPATIENT
Start: 2025-08-18